# Patient Record
Sex: FEMALE | Race: WHITE | NOT HISPANIC OR LATINO | Employment: PART TIME | URBAN - METROPOLITAN AREA
[De-identification: names, ages, dates, MRNs, and addresses within clinical notes are randomized per-mention and may not be internally consistent; named-entity substitution may affect disease eponyms.]

---

## 2017-02-27 ENCOUNTER — ALLSCRIPTS OFFICE VISIT (OUTPATIENT)
Dept: OTHER | Facility: OTHER | Age: 47
End: 2017-02-27

## 2017-05-03 ENCOUNTER — ALLSCRIPTS OFFICE VISIT (OUTPATIENT)
Dept: OTHER | Facility: OTHER | Age: 47
End: 2017-05-03

## 2017-08-14 ENCOUNTER — ALLSCRIPTS OFFICE VISIT (OUTPATIENT)
Dept: OTHER | Facility: OTHER | Age: 47
End: 2017-08-14

## 2017-08-14 DIAGNOSIS — E78.5 HYPERLIPIDEMIA: ICD-10-CM

## 2017-08-14 DIAGNOSIS — E03.9 HYPOTHYROIDISM: ICD-10-CM

## 2017-08-14 DIAGNOSIS — Z13.1 ENCOUNTER FOR SCREENING FOR DIABETES MELLITUS: ICD-10-CM

## 2017-08-24 ENCOUNTER — ALLSCRIPTS OFFICE VISIT (OUTPATIENT)
Dept: OTHER | Facility: OTHER | Age: 47
End: 2017-08-24

## 2017-11-09 ENCOUNTER — ANESTHESIA EVENT (OUTPATIENT)
Dept: PERIOP | Facility: AMBULARY SURGERY CENTER | Age: 47
End: 2017-11-09
Payer: COMMERCIAL

## 2017-11-26 ENCOUNTER — LAB CONVERSION - ENCOUNTER (OUTPATIENT)
Dept: OTHER | Facility: OTHER | Age: 47
End: 2017-11-26

## 2017-11-26 LAB
A/G RATIO (HISTORICAL): 1.6 (CALC) (ref 1–2.5)
ALBUMIN SERPL BCP-MCNC: 4.5 G/DL (ref 3.6–5.1)
ALP SERPL-CCNC: 82 U/L (ref 33–115)
ALT SERPL W P-5'-P-CCNC: 15 U/L (ref 6–29)
AST SERPL W P-5'-P-CCNC: 19 U/L (ref 10–35)
BACTERIA UR QL AUTO: ABNORMAL /HPF
BASOPHILS # BLD AUTO: 1.5 %
BASOPHILS # BLD AUTO: 60 CELLS/UL (ref 0–200)
BILIRUB SERPL-MCNC: 0.5 MG/DL (ref 0.2–1.2)
BILIRUB UR QL STRIP: NEGATIVE
BUN SERPL-MCNC: 21 MG/DL (ref 7–25)
BUN/CREA RATIO (HISTORICAL): NORMAL (CALC) (ref 6–22)
CALCIUM SERPL-MCNC: 9.3 MG/DL (ref 8.6–10.2)
CHLORIDE SERPL-SCNC: 106 MMOL/L (ref 98–110)
CHOLEST SERPL-MCNC: 252 MG/DL
CHOLEST/HDLC SERPL: 5.5 (CALC)
CO2 SERPL-SCNC: 28 MMOL/L (ref 20–31)
COLOR UR: YELLOW
COMMENT (HISTORICAL): CLEAR
CREAT SERPL-MCNC: 0.83 MG/DL (ref 0.5–1.1)
DEPRECATED RDW RBC AUTO: 12.2 % (ref 11–15)
EGFR AFRICAN AMERICAN (HISTORICAL): 97 ML/MIN/1.73M2
EGFR-AMERICAN CALC (HISTORICAL): 84 ML/MIN/1.73M2
EOSINOPHIL # BLD AUTO: 280 CELLS/UL (ref 15–500)
EOSINOPHIL # BLD AUTO: 7 %
FECAL OCCULT BLOOD DIAGNOSTIC (HISTORICAL): NEGATIVE
GAMMA GLOBULIN (HISTORICAL): 2.8 G/DL (CALC) (ref 1.9–3.7)
GLUCOSE (HISTORICAL): 78 MG/DL (ref 65–99)
GLUCOSE (HISTORICAL): NEGATIVE
HBA1C MFR BLD HPLC: 5.1 % OF TOTAL HGB
HCT VFR BLD AUTO: 37.2 % (ref 35–45)
HDLC SERPL-MCNC: 46 MG/DL
HGB BLD-MCNC: 12.5 G/DL (ref 11.7–15.5)
HYALINE CASTS #/AREA URNS LPF: ABNORMAL /LPF
KETONES UR STRIP-MCNC: NEGATIVE MG/DL
LDL CHOLESTEROL (HISTORICAL): 190 MG/DL (CALC)
LEUKOCYTE ESTERASE UR QL STRIP: ABNORMAL
LYMPHOCYTES # BLD AUTO: 1228 CELLS/UL (ref 850–3900)
LYMPHOCYTES # BLD AUTO: 30.7 %
MCH RBC QN AUTO: 30.8 PG (ref 27–33)
MCHC RBC AUTO-ENTMCNC: 33.6 G/DL (ref 32–36)
MCV RBC AUTO: 91.6 FL (ref 80–100)
MONOCYTES # BLD AUTO: 348 CELLS/UL (ref 200–950)
MONOCYTES (HISTORICAL): 8.7 %
NEUTROPHILS # BLD AUTO: 2084 CELLS/UL (ref 1500–7800)
NEUTROPHILS # BLD AUTO: 52.1 %
NITRITE UR QL STRIP: NEGATIVE
NON-HDL-CHOL (CHOL-HDL) (HISTORICAL): 206 MG/DL (CALC)
PH UR STRIP.AUTO: 6 [PH] (ref 5–8)
PLATELET # BLD AUTO: 204 THOUSAND/UL (ref 140–400)
PMV BLD AUTO: 12 FL (ref 7.5–12.5)
POTASSIUM SERPL-SCNC: 3.8 MMOL/L (ref 3.5–5.3)
PROT UR STRIP-MCNC: NEGATIVE MG/DL
RBC # BLD AUTO: 4.06 MILLION/UL (ref 3.8–5.1)
RBC (HISTORICAL): ABNORMAL /HPF
SODIUM SERPL-SCNC: 140 MMOL/L (ref 135–146)
SP GR UR STRIP.AUTO: 1.02 (ref 1–1.03)
SQUAMOUS EPITHELIAL CELLS (HISTORICAL): ABNORMAL /HPF
TOTAL PROTEIN (HISTORICAL): 7.3 G/DL (ref 6.1–8.1)
TRIGL SERPL-MCNC: 63 MG/DL
WBC # BLD AUTO: 4 THOUSAND/UL (ref 3.8–10.8)
WBC # BLD AUTO: ABNORMAL /HPF

## 2017-11-29 ENCOUNTER — DOCTOR'S OFFICE (OUTPATIENT)
Dept: URBAN - METROPOLITAN AREA CLINIC 137 | Facility: CLINIC | Age: 47
Setting detail: OPHTHALMOLOGY
End: 2017-11-29
Payer: COMMERCIAL

## 2017-11-29 DIAGNOSIS — G43.109: ICD-10-CM

## 2017-11-29 PROCEDURE — 99203 OFFICE O/P NEW LOW 30 MIN: CPT | Performed by: OPHTHALMOLOGY

## 2017-11-29 ASSESSMENT — REFRACTION_MANIFEST
OD_VA3: 20/
OU_VA: 20/
OU_VA: 20/
OD_VA2: 20/
OD_VA3: 20/
OS_VA3: 20/
OD_VA1: 20/
OD_VA3: 20/
OS_VA3: 20/
OU_VA: 20/
OS_VA1: 20/
OS_VA2: 20/
OS_VA1: 20/
OS_VA2: 20/
OS_VA2: 20/
OS_VA1: 20/
OD_VA2: 20/
OD_VA1: 20/
OD_VA1: 20/
OD_VA2: 20/
OS_VA3: 20/

## 2017-11-29 ASSESSMENT — VISUAL ACUITY
OS_BCVA: 20/20
OD_BCVA: 20/20-2

## 2017-11-29 ASSESSMENT — REFRACTION_CURRENTRX
OS_OVR_VA: 20/
OS_OVR_VA: 20/
OD_AXIS: 150
OD_OVR_VA: 20/
OS_OVR_VA: 20/
OS_CYLINDER: +1.75
OD_OVR_VA: 20/
OS_VPRISM_DIRECTION: SV
OS_AXIS: 176
OS_SPHERE: -1.75
OD_SPHERE: -1.75
OD_OVR_VA: 20/
OD_CYLINDER: +1.50
OD_VPRISM_DIRECTION: SV

## 2017-11-29 ASSESSMENT — REFRACTION_AUTOREFRACTION
OD_AXIS: 156
OS_SPHERE: -2.00
OD_SPHERE: -2.00
OD_CYLINDER: +1.25
OS_AXIS: 177
OS_CYLINDER: +2.00

## 2017-11-29 ASSESSMENT — SPHEQUIV_DERIVED
OD_SPHEQUIV: -1.375
OS_SPHEQUIV: -1

## 2017-11-29 ASSESSMENT — CONFRONTATIONAL VISUAL FIELD TEST (CVF)
OD_FINDINGS: FULL
OS_FINDINGS: FULL

## 2017-12-05 ENCOUNTER — ALLSCRIPTS OFFICE VISIT (OUTPATIENT)
Dept: OTHER | Facility: OTHER | Age: 47
End: 2017-12-05

## 2017-12-05 ENCOUNTER — LAB REQUISITION (OUTPATIENT)
Dept: LAB | Facility: HOSPITAL | Age: 47
End: 2017-12-05
Payer: COMMERCIAL

## 2017-12-05 DIAGNOSIS — Z12.31 ENCOUNTER FOR SCREENING MAMMOGRAM FOR MALIGNANT NEOPLASM OF BREAST: ICD-10-CM

## 2017-12-05 DIAGNOSIS — K21.9 GASTRO-ESOPHAGEAL REFLUX DISEASE WITHOUT ESOPHAGITIS: ICD-10-CM

## 2017-12-05 DIAGNOSIS — Z01.419 ENCOUNTER FOR GYNECOLOGICAL EXAMINATION WITHOUT ABNORMAL FINDING: ICD-10-CM

## 2017-12-05 PROCEDURE — G0145 SCR C/V CYTO,THINLAYER,RESCR: HCPCS | Performed by: OBSTETRICS & GYNECOLOGY

## 2017-12-05 PROCEDURE — 87624 HPV HI-RISK TYP POOLED RSLT: CPT | Performed by: OBSTETRICS & GYNECOLOGY

## 2017-12-07 ENCOUNTER — DOCTOR'S OFFICE (OUTPATIENT)
Dept: URBAN - METROPOLITAN AREA CLINIC 137 | Facility: CLINIC | Age: 47
Setting detail: OPHTHALMOLOGY
End: 2017-12-07
Payer: COMMERCIAL

## 2017-12-07 DIAGNOSIS — G43.109: ICD-10-CM

## 2017-12-07 PROCEDURE — 92083 EXTENDED VISUAL FIELD XM: CPT | Performed by: OPHTHALMOLOGY

## 2017-12-13 LAB
HPV RRNA GENITAL QL NAA+PROBE: NORMAL
LAB AP GYN PRIMARY INTERPRETATION: NORMAL
Lab: NORMAL

## 2017-12-14 ENCOUNTER — GENERIC CONVERSION - ENCOUNTER (OUTPATIENT)
Dept: OTHER | Facility: OTHER | Age: 47
End: 2017-12-14

## 2017-12-18 ENCOUNTER — ALLSCRIPTS OFFICE VISIT (OUTPATIENT)
Dept: OTHER | Facility: OTHER | Age: 47
End: 2017-12-18

## 2017-12-19 NOTE — PROGRESS NOTES
Assessment  1  Allergic rhinitis (477 9) (J30 9)   2  Allergic conjunctivitis (372 14) (H10 10)   3  Chronic otitis externa (380 23) (H60 60)    Plan  Allergic conjunctivitis    · Olopatadine HCl - 0 1 % Ophthalmic Solution (Patanol); INSTILL 1 DROP INTOAFFECTED EYE(S) TWICE DAILY AS Needed  Chronic otitis externa    · Ciprodex 0 3-0 1 % Otic Suspension; INSTILL 4 DROPS IN THE AFFECTEDEAR(S) TWICE DAILY X 7-10 DAYS   · 1 Jenna Bolton MD, Michelle Marie  (Otolaryngology) Co-Management  *  Status: Hold For - Scheduling Requested for: [id number]  Care Summary provided  : Yes    Discussion/Summary    rhinoconjunctivitis: Continued OTC saline spray + Flonase  Add Claritin/Zyrtec/Allegra, -D version if needed for congestion  Rx eye drops  Call for no improvement/worsening over the next week  Chronic right OE: Previous topical steroid use didn't seem to help  Rx ear drops  ENT if no better/recurrent  Referral information given  The treatment plan was reviewed with the patient/guardian  The patient/guardian understands and agrees with the treatment plan      Chief Complaint  Pt here c/o sinus infection      History of Present Illness  HPI: with complaints of nasal congestion, clear rhinorrhea, sneezing, itchy eyes x 5 days  No cough, sore throat, fever  Mild headaches  No sinus tenderness  Normal appetite  No N/V/D/abdominal pain  Had flu shot  Chronic right ear itching ( x 2 years)  Scaly, red skin  Gets occasional clear drainage  No pain  No relief from OTC swimmer's ear drops, hydrocortisone, triamcinolone  No swimming  Left ear fine  Review of Systems   Constitutional: as noted in HPI   ENT: as noted in HPI  Cardiovascular: as noted in HPI  Respiratory: as noted in HPI  Gastrointestinal: as noted in HPI  Musculoskeletal: no myalgias  Neurological: as noted in HPI  Active Problems  1  Allergic rhinitis (477 9) (J30 9)   2  Anxiety disorder (300 00) (F41 9)   3  Early menopause (256 31) (E28 319)   4   Encounter for screening mammogram for malignant neoplasm of breast (V76 12) (Z12 31)   5  Esophageal reflux (530 81) (K21 9)   6  Hyperlipidemia (272 4) (E78 5)   7  Hypothyroidism (244 9) (E03 9)   8  Lactose intolerance (271 3) (E73 9)   9  Migraine (346 90) (G43 909)   10  Migraines (346 90) (G43 909)   11  Well female exam with routine gynecological exam (V72 31) (Z01 419)    Past Medical History  1  History of Acute frontal sinusitis (461 1) (J01 10)   2  History of Acute maxillary sinusitis (461 0) (J01 00)   3  History of Acute UTI (599 0) (N39 0)   4  Allergic conjunctivitis (372 14) (H10 10)   5  History of Cellulitis of leg (682 6) (L03 119)   6  Chronic otitis externa (380 23) (H60 60)   7  Contact dermatitis (692 9) (L25 9)   8  History of Contact dermatitis due to poison ivy (692 6) (L23 7)   9  History of Contusion of lower back, initial encounter (922 31) (S30 0XXA)   10  History of acute pharyngitis (V12 69) (Z87 09)   11  History of acute sinusitis (V12 69) (Z87 09)   12  History of allergic rhinitis (V12 69) (Z87 09)   13  History of chest pain (V13 89) (Z87 898)   14  History of spontaneous  (V13 29) (Z87 59)   15  Hyperlipidemia (272 4) (E78 5)   16  History of Otitis externa, unspecified laterality   17  History of  (spontaneous vaginal delivery) (650) (O80)   18  History of Varicose Veins Of Lower Extremities (454 9)    Family History  Mother    1  Denied: Family history of Colon cancer   2  Denied: Family history of colonic polyps   3  Denied: Family history of liver disease  Father    4  Denied: Family history of Colon cancer   5  Denied: Family history of colonic polyps   6  Family history of hyperlipidemia (V18 19) (Z83 49)   7   Denied: Family history of liver disease    Social History   · Does not exercise (V69 0) (Z72 3)   · Denied: History of Drug use   · High school graduate   ·    · 3 children   · Never A Smoker   · No alcohol use   · 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 school teacher    Surgical History  1  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   2  History of Tubal Ligation   3  History of Varicose Vein Ligation    Current Meds   1  Maxalt 10 MG Oral Tablet; Therapy: (Recorded:11Nnb5665) to Recorded   2  Sertraline HCl - 100 MG Oral Tablet; Take 1 tablet daily; Therapy: 24Aug2012 to (Last Rx:14Aug2017)  Requested for: 14Aug2017 Ordered   3  Synthroid 75 MCG Oral Tablet; Therapy: (Recorded:24Qow2832) to Recorded   4  Zantac 150 MG CAPS; Therapy: (Recorded:41Nsy7815) to Recorded    Allergies  1  Cefzil TABS   2  Flagyl CAPS   3  Percocet TABS   4  Biaxin TABS  5  Latex   6  FRUIT    Vitals   Recorded: 45HPH8097 01:35PM   Temperature 98 5 F, Tympanic   Heart Rate 95   Systolic 087   Diastolic 78   Weight 170 lb 4 oz   BMI Calculated 21 72   BSA Calculated 1 83   O2 Saturation 98     Physical Exam   Constitutional  General appearance: No acute distress, well appearing and well nourished  Head and Face  Head and face: Normal    Palpation of the face and sinuses: No sinus tenderness  Eyes  Conjunctiva and lids: Abnormal  -- Minimal bilateral injection of palpebral conjunctiva  Lower lids with mild swelling, erythema, nontender  Pupils and irises: Equal, round, reactive to light  -- No scleral injection  Ears, Nose, Mouth, and Throat  External inspection of ears and nose: Abnormal  -- Right ear with mildly erythematous scaly skin at opening to canal and immediately surrounding  Otoscopic examination: Abnormal  -- Proximal canal with mildly erythematous, scaly skin without swelling or discharge  Remainder of canal + TM wnl  Nasal mucosa, septum, and turbinates: Abnormal  -- Turbinates pink, swollen  Oropharynx: Normal with no erythema, edema, exudate or lesions  Neck  Neck: Supple, symmetric, trachea midline, no masses  Pulmonary  Respiratory effort: No increased work of breathing or signs of respiratory distress     Auscultation of lungs: Clear to auscultation  Cardiovascular  Auscultation of heart: Normal rate and rhythm, normal S1 and S2, no murmurs  Lymphatic  Palpation of lymph nodes in neck: No lymphadenopathy  Musculoskeletal  Gait and station: Normal    Psychiatric  Orientation to person, place, and time: Normal        Future Appointments    Date/Time Provider Specialty Site   12/27/2017 08:00 AM QUE Machado   [facility] [address] OUTPATIENT     Signatures   Electronically signed by : RICARDO Schmitt; Dec 18 2017  2:07PM EST                       (Author)    Electronically signed by : Giovanni Sanchez DO; Dec 18 2017  2:23PM EST                       (Author)

## 2017-12-22 ENCOUNTER — DOCTOR'S OFFICE (OUTPATIENT)
Dept: URBAN - METROPOLITAN AREA CLINIC 137 | Facility: CLINIC | Age: 47
Setting detail: OPHTHALMOLOGY
End: 2017-12-22
Payer: COMMERCIAL

## 2017-12-22 ENCOUNTER — RX ONLY (RX ONLY)
Age: 47
End: 2017-12-22

## 2017-12-22 DIAGNOSIS — G43.109: ICD-10-CM

## 2017-12-22 DIAGNOSIS — H25.13: ICD-10-CM

## 2017-12-22 PROCEDURE — 92014 COMPRE OPH EXAM EST PT 1/>: CPT | Performed by: OPHTHALMOLOGY

## 2017-12-22 RX ORDER — LEVOTHYROXINE SODIUM 0.07 MG/1
75 TABLET ORAL DAILY
COMMUNITY
End: 2018-03-02

## 2017-12-22 RX ORDER — SERTRALINE HYDROCHLORIDE 100 MG/1
100 TABLET, FILM COATED ORAL
COMMUNITY
End: 2018-10-23 | Stop reason: SDUPTHER

## 2017-12-22 RX ORDER — RANITIDINE 150 MG/1
150 TABLET ORAL
COMMUNITY
End: 2019-02-25 | Stop reason: SDUPTHER

## 2017-12-22 ASSESSMENT — CONFRONTATIONAL VISUAL FIELD TEST (CVF)
OS_FINDINGS: FULL
OD_FINDINGS: FULL

## 2017-12-22 NOTE — PRE-PROCEDURE INSTRUCTIONS
Pre-Surgery Instructions:   Medication Instructions    levothyroxine 75 mcg tablet Instructed patient per Anesthesia Guidelines   ranitidine (ZANTAC) 150 MG/10ML syrup Instructed patient per Anesthesia Guidelines   sertraline (ZOLOFT) 100 mg tablet Instructed patient per Anesthesia Guidelines      Pre procedure instructions reviewed

## 2017-12-25 ASSESSMENT — REFRACTION_MANIFEST
OS_VA2: 20/
OD_VA3: 20/
OS_VA3: 20/
OU_VA: 20/
OU_VA: 20/
OD_VA3: 20/
OD_VA2: 20/
OS_VA1: 20/
OD_VA2: 20/
OD_VA2: 20/
OS_VA2: 20/
OS_VA1: 20/
OU_VA: 20/
OS_VA3: 20/
OS_VA1: 20/
OD_VA1: 20/
OS_VA2: 20/
OD_VA3: 20/
OD_VA1: 20/
OD_VA1: 20/
OS_VA3: 20/

## 2017-12-25 ASSESSMENT — SPHEQUIV_DERIVED
OS_SPHEQUIV: -1
OD_SPHEQUIV: -1.375

## 2017-12-25 ASSESSMENT — REFRACTION_AUTOREFRACTION
OD_AXIS: 156
OD_CYLINDER: +1.25
OS_AXIS: 177
OS_CYLINDER: +2.00
OD_SPHERE: -2.00
OS_SPHERE: -2.00

## 2017-12-25 ASSESSMENT — REFRACTION_CURRENTRX
OD_VPRISM_DIRECTION: SV
OS_OVR_VA: 20/
OS_SPHERE: -1.75
OS_CYLINDER: +1.75
OD_SPHERE: -1.75
OD_CYLINDER: +1.50
OS_OVR_VA: 20/
OS_AXIS: 176
OS_OVR_VA: 20/
OD_AXIS: 150
OD_OVR_VA: 20/
OS_VPRISM_DIRECTION: SV

## 2017-12-25 ASSESSMENT — VISUAL ACUITY
OS_BCVA: 20/20
OD_BCVA: 20/20

## 2017-12-26 ENCOUNTER — ALLSCRIPTS OFFICE VISIT (OUTPATIENT)
Dept: OTHER | Facility: OTHER | Age: 47
End: 2017-12-26

## 2017-12-27 ENCOUNTER — ANESTHESIA (OUTPATIENT)
Dept: PERIOP | Facility: AMBULARY SURGERY CENTER | Age: 47
End: 2017-12-27
Payer: COMMERCIAL

## 2017-12-27 ENCOUNTER — GENERIC CONVERSION - ENCOUNTER (OUTPATIENT)
Dept: OTHER | Facility: OTHER | Age: 47
End: 2017-12-27

## 2017-12-27 ENCOUNTER — HOSPITAL ENCOUNTER (OUTPATIENT)
Facility: AMBULARY SURGERY CENTER | Age: 47
Setting detail: OUTPATIENT SURGERY
Discharge: HOME | End: 2017-12-27
Attending: INTERNAL MEDICINE | Admitting: INTERNAL MEDICINE
Payer: COMMERCIAL

## 2017-12-27 VITALS
WEIGHT: 150 LBS | BODY MASS INDEX: 21.47 KG/M2 | HEART RATE: 70 BPM | SYSTOLIC BLOOD PRESSURE: 124 MMHG | DIASTOLIC BLOOD PRESSURE: 80 MMHG | TEMPERATURE: 98.1 F | RESPIRATION RATE: 18 BRPM | HEIGHT: 70 IN | OXYGEN SATURATION: 97 %

## 2017-12-27 DIAGNOSIS — K21.9 ESOPHAGEAL REFLUX: ICD-10-CM

## 2017-12-27 PROCEDURE — 88342 IMHCHEM/IMCYTCHM 1ST ANTB: CPT | Performed by: INTERNAL MEDICINE

## 2017-12-27 PROCEDURE — 88305 TISSUE EXAM BY PATHOLOGIST: CPT | Performed by: INTERNAL MEDICINE

## 2017-12-27 RX ORDER — SODIUM CHLORIDE 9 MG/ML
100 INJECTION, SOLUTION INTRAVENOUS CONTINUOUS
Status: DISCONTINUED | OUTPATIENT
Start: 2017-12-27 | End: 2017-12-27 | Stop reason: HOSPADM

## 2017-12-27 RX ORDER — LIDOCAINE HYDROCHLORIDE 10 MG/ML
INJECTION, SOLUTION EPIDURAL; INFILTRATION; INTRACAUDAL; PERINEURAL AS NEEDED
Status: DISCONTINUED | OUTPATIENT
Start: 2017-12-27 | End: 2017-12-27 | Stop reason: SURG

## 2017-12-27 RX ORDER — FENTANYL CITRATE 50 UG/ML
INJECTION, SOLUTION INTRAMUSCULAR; INTRAVENOUS AS NEEDED
Status: DISCONTINUED | OUTPATIENT
Start: 2017-12-27 | End: 2017-12-27

## 2017-12-27 RX ORDER — PROPOFOL 10 MG/ML
INJECTION, EMULSION INTRAVENOUS AS NEEDED
Status: DISCONTINUED | OUTPATIENT
Start: 2017-12-27 | End: 2017-12-27 | Stop reason: SURG

## 2017-12-27 RX ORDER — AMOXICILLIN 875 MG/1
875 TABLET, COATED ORAL 2 TIMES DAILY
COMMUNITY
End: 2018-03-02

## 2017-12-27 RX ADMIN — PROPOFOL 100 MG: 10 INJECTION, EMULSION INTRAVENOUS at 08:41

## 2017-12-27 RX ADMIN — SODIUM CHLORIDE: 0.9 INJECTION, SOLUTION INTRAVENOUS at 08:38

## 2017-12-27 RX ADMIN — LIDOCAINE HYDROCHLORIDE 5 ML: 10 INJECTION, SOLUTION EPIDURAL; INFILTRATION; INTRACAUDAL; PERINEURAL at 08:46

## 2017-12-27 RX ADMIN — PROPOFOL 100 MG: 10 INJECTION, EMULSION INTRAVENOUS at 08:48

## 2017-12-27 RX ADMIN — SODIUM CHLORIDE 100 ML/HR: 0.9 INJECTION, SOLUTION INTRAVENOUS at 08:29

## 2017-12-27 NOTE — OP NOTE
ESOPHAGOGASTRODUODENOSCOPY    PROCEDURE: EGD/ Biopsy    INDICATIONS: Abdominal pain, Epigastric and Dyspepsia    POST-OP DIAGNOSIS: See the impression below    SEDATION: Monitored anesthesia care, check anesthesia records    PHYSICAL EXAM:    Vitals:    12/27/17 0741   BP: 123/72   Pulse: 81   Resp: 18   Temp: 97 7 °F (36 5 °C)   SpO2: 96%    Body mass index is 21 52 kg/m²  General: NAD  Heart: S1 & S2 normal, RRR  Lungs: CTA, No rales or rhonchi  Abdomen: Soft, nontender, nondistended, good bowel sounds    CONSENT:  Informed consent was obtained for the procedure, including sedation after explaining the risks and benefits of the procedure  Risks including but not limited to bleeding, perforation, infection, aspiration were discussed in detail  Also explained about less than 100% sensitivity with the exam and other alternatives  PREPARATION:   EKG tracing, pulse oximetry, blood pressure were monitored throughout the procedure  Patient was identified by myself both verbally and by visual inspection of ID band  DESCRIPTION:   Patient was placed in the left lateral decubitus position and was sedated with the above medication  The gastroscope was introduced in to the oropharynx and the esophagus was intubated under direct visualization  Scope was passed down the esophagus up to 2nd part of the duodenum  A careful inspection was made as the gastroscope was withdrawn, including a retroflexed view of the stomach; findings and interventions are described below  FINDINGS:    #1  Esophagus and GEJ-normal esophagus and GE junction    #2  Stomach-absence of gastric folds, up atrophic appearing mucosa biopsies taken, appeared to be some general deformity significant J-shaped stomach    #3   Duodenum-normal appearing duodenum, biopsies taken again some anatomic deformity existed but this may have been due to the severity of her J-shaped stomach         IMPRESSIONS:      Normal duodenum, biopsies taken  Atrophic appearing stomach, biopsies taken  Normal retroflexion  Severely J-shaped stomach  Normal esophagus    RECOMMENDATIONS:     Discharge home  Resume regular diet  Resume home medications  Follow up biopsy results, call the office in 3 weeks for results  Recommend upper GI series  Call with any abdominal pain, bleeding, fevers    COMPLICATIONS:  None; patient tolerated the procedure well            DISPOSITION: PACU           CONDITION: Stable

## 2017-12-27 NOTE — H&P
History and Physical -  [facility]  Nilton Shine 52 y o  female MRN: [id number]    HPI: Nilton Shine is a 52y o  year old female who presents with bloating, abdominal pain  Review of Systems    Historical Information   Past Medical History:   Diagnosis Date    Anxiety     Disease of thyroid gland     GERD (gastroesophageal reflux disease)     Headache     Hyperlipidemia      Past Surgical History:   Procedure Laterality Date    DILATION AND CURETTAGE OF UTERUS      EAR SURGERY      age 11    VEIN LIGATION AND STRIPPING Bilateral     WISDOM TOOTH EXTRACTION       Social History   History   Alcohol Use No     History   Drug Use No     History   Smoking Status    Never Smoker   Smokeless Tobacco    Never Used     History reviewed  No pertinent family history  Meds/Allergies     Prescriptions Prior to Admission   Medication    amoxicillin (AMOXIL) 875 mg tablet    levothyroxine 75 mcg tablet    ranitidine (ZANTAC) 150 MG/10ML syrup    sertraline (ZOLOFT) 100 mg tablet       Allergies   Allergen Reactions    Biaxin [Clarithromycin] Diarrhea    Cefzil [Cefprozil] Dizziness    Flagyl [Metronidazole] Nausea Only    Percocet [Oxycodone-Acetaminophen] Nausea Only    Latex Rash       Objective     Blood pressure 123/72, pulse 81, temperature 97 7 °F (36 5 °C), temperature source Temporal, resp  rate 18, height 5' 10" (1 778 m), weight 68 kg (150 lb), SpO2 96 %  PHYSICAL EXAM    Gen: NAD  CV: RRR  CHEST: Clear  ABD: soft, NT/ND  EXT: no edema  Neuro: AAO      ASSESSMENT/PLAN:  This is a 52y o  year old female here for bloating, abdominal pain         PLAN:   Procedure: Collin Lilly

## 2017-12-27 NOTE — ANESTHESIA POSTPROCEDURE EVALUATION
Post-Op Assessment Note      CV Status:  Stable    Mental Status:  Alert and awake    Hydration Status:  Euvolemic    PONV Controlled:  Controlled    Airway Patency:  Patent    Post Op Vitals Reviewed: Yes          Staff: Anesthesiologist           /73 (12/27/17 0852)    Temp 98 1 °F (36 7 °C) (12/27/17 0852)    Pulse 86 (12/27/17 0852)   Resp 16 (12/27/17 0852)    SpO2 96 % (12/27/17 0852)

## 2017-12-27 NOTE — ANESTHESIA PREPROCEDURE EVALUATION
Review of Systems/Medical History  Patient summary reviewed        Cardiovascular  Hyperlipidemia,    Pulmonary  Negative pulmonary ROS ,        GI/Hepatic    GERD ,        Negative  ROS        Endo/Other  History of thyroid disease , hypothyroidism,      GYN  Negative gynecology ROS          Hematology  Negative hematology ROS      Musculoskeletal  Negative musculoskeletal ROS        Neurology  Negative neurology ROS      Psychology   Anxiety,            Physical Exam    Airway    Mallampati score: II  TM Distance: >3 FB  Neck ROM: full     Dental       Cardiovascular  Cardiovascular exam normal    Pulmonary  Pulmonary exam normal     Other Findings        Anesthesia Plan  ASA Score- 2     Anesthesia Type- IV sedation with anesthesia with ASA Monitors  Additional Monitors:   Airway Plan:         Plan Factors-    Induction- intravenous  Postoperative Plan-     Informed Consent- Anesthetic plan and risks discussed with patient  I personally reviewed this patient with the CRNA  Discussed and agreed on the Anesthesia Plan with the CRNA  Jimmy Abbott

## 2017-12-27 NOTE — DISCHARGE INSTRUCTIONS
Discharge home  Resume regular diet  Resume home medications  Follow up biopsy results, call the office in 3 weeks for results  Recommend upper GI series  Call with any abdominal pain, bleeding, fevers

## 2017-12-27 NOTE — PROGRESS NOTES
Assessment   1  Acute non-recurrent pansinusitis (461 8) (J01 40)    Plan   Acute non-recurrent pansinusitis    · Amoxicillin-Pot Clavulanate 875-125 MG Oral Tablet; TAKE 1 TABLET EVERY 12    HOURS WITH MEALS UNTIL GONE  PMH: Otitis externa, unspecified laterality    · Neomycin-Polymyxin-HC 3 [id number] Otic Suspension; INSTILL 3 DROPS IN    AFFECTED EAR(S) 3-4 TIMES DAILY    Discussion/Summary      Symptoms appear to be most consistent with sinusitis  Will empirically treat with 10 day course of oral Augmentin  Recommend supportive care with saltwater gargles and saline nasal spray  Patient to call if symptoms not improving in 2-3 days--would consider changing antibiotic at that time  The patient was counseled regarding diagnostic results,-- instructions for management,-- risk factor reductions,-- prognosis,-- patient and family education,-- impressions,-- risks and benefits of treatment options,-- importance of compliance with treatment  Possible side effects of new medications were reviewed with the patient/guardian today  The treatment plan was reviewed with the patient/guardian  The patient/guardian understands and agrees with the treatment plan      Chief Complaint   PT c/o sinus congestion x 1 week w productive cough  Some difficulty at night due to congestion  History of Present Illness   HPI: Patient presents complaining of sinus infection began greater than 1 week ago, worsening nasal congestion, sinus pressure, frontal headaches, productive cough, fatigue/malaise fevers, chills, nausea, vomiting, diarrhea, chest pain, shortness of breath/wheezing relief with over-the-counter allergy or cough and cold remedies has been on multiple sick contacts as she does work in a  setting      Review of Systems        Constitutional: as noted in HPI       ENT: as noted in HPI        Cardiovascular: no complaints of slow or fast heart rate, no chest pain, no palpitations, no leg claudication or lower extremity edema  Respiratory: as noted in HPI  Gastrointestinal: no complaints of abdominal pain, no constipation, no nausea or diarrhea, no vomiting, no bloody stools  Genitourinary: no complaints of dysuria, no incontinence, no pelvic pain, no dysmenorrhea, no vaginal discharge or abnormal vaginal bleeding  Musculoskeletal: no complaints of arthralgia, no myalgia, no joint swelling or stiffness, no limb pain or swelling  Integumentary: no complaints of skin rash or lesion, no itching or dry skin, no skin wounds  Neurological: no complaints of headache, no confusion, no numbness or tingling, no dizziness or fainting  Active Problems   1  Acute upper respiratory infection (465 9) (J06 9)   2  Allergic conjunctivitis (372 14) (H10 10)   3  Allergic rhinitis (477 9) (J30 9)   4  Anxiety disorder (300 00) (F41 9)   5  Chronic otitis externa (380 23) (H60 60)   6  Early menopause (256 31) (E28 319)   7  Encounter for screening mammogram for malignant neoplasm of breast (V76 12)     (Z12 31)   8  Esophageal reflux (530 81) (K21 9)   9  Hyperlipidemia (272 4) (E78 5)   10  Hypothyroidism (244 9) (E03 9)   11  Lactose intolerance (271 3) (E73 9)   12  Migraine (346 90) (G43 909)   13  Migraines (346 90) (G43 909)   14  Well female exam with routine gynecological exam (V72 31) (Z01 419)    Past Medical History   1  History of Acute frontal sinusitis (461 1) (J01 10)   2  History of Acute maxillary sinusitis (461 0) (J01 00)   3  History of Acute UTI (599 0) (N39 0)   4  Allergic conjunctivitis (372 14) (H10 10)   5  History of Cellulitis of leg (682 6) (L03 119)   6  Chronic otitis externa (380 23) (H60 60)   7  Contact dermatitis (692 9) (L25 9)   8  History of Contact dermatitis due to poison ivy (692 6) (L23 7)   9  History of Contusion of lower back, initial encounter (922 31) (S30 0XXA)   10  History of acute pharyngitis (V12 69) (Z87 09)   11   History of acute sinusitis (V12 69) (Z87 09)   12  History of allergic rhinitis (V12 69) (Z87 09)   13  History of chest pain (V13 89) (Z87 898)   14  History of spontaneous  (V13 29) (Z87 59)   15  Hyperlipidemia (272 4) (E78 5)   16  History of Otitis externa, unspecified laterality   17  History of  (spontaneous vaginal delivery) (650) (O80)   18  History of Varicose Veins Of Lower Extremities (454 9)  Active Problems And Past Medical History Reviewed: The active problems and past medical history were reviewed and updated today  Family History   Mother    1  Denied: Family history of Colon cancer   2  Denied: Family history of colonic polyps   3  Denied: Family history of liver disease  Father    4  Denied: Family history of Colon cancer   5  Denied: Family history of colonic polyps   6  Family history of hyperlipidemia (V18 19) (Z83 49)   7  Denied: Family history of liver disease  Family History Reviewed: The family history was reviewed and updated today  Social History    · Does not exercise (V69 0) (Z72 3)   · Denied: History of Drug use   · High school graduate   ·    · 3 children   · Never A Smoker   · No alcohol use   · 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   The social history was reviewed and updated today  The social history was reviewed and is unchanged  Surgical History   1  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   2  History of Tubal Ligation   3  History of Varicose Vein Ligation  Surgical History Reviewed: The surgical history was reviewed and updated today  Current Meds    1  Maxalt 10 MG Oral Tablet; Take one tablet daily as needed; Therapy: (Recorded:06Mgo3651) to Recorded   2  Promethazine-Codeine 6 25-10 MG/5ML Oral Syrup; take 1 teaspoonful every 4 to 6     hours as needed for cough; Therapy: 16Ytg4933 to (Evaluate:47Vii5185); Last Rx:06Kax6551 Ordered   3  Sertraline HCl - 100 MG Oral Tablet; Take 1 tablet daily;      Therapy: 81Crv8584 to (Last Rx: 10UUL6167)  Requested for: 22TXM0222 Ordered   4  Synthroid 75 MCG Oral Tablet; TAKE 1 TABLET DAILY; Therapy: (Recorded:70Jde9910) to Recorded   5  Zantac 150 MG CAPS; Therapy: (Recorded:53Acf2026) to Recorded     The medication list was reviewed and updated today  Allergies   1  Cefzil TABS   2  Flagyl CAPS   3  Percocet TABS   4  Biaxin TABS  5  Latex   6  FRUIT    Vitals    Recorded: 13UOZ6211 04:17PM   Temperature 98 6 F, Tympanic   Heart Rate 95   Systolic 062, LUE, Sitting   Diastolic 80, LUE, Sitting   Height 5 ft 8 in   Weight 152 lb    BMI Calculated 23 11   BSA Calculated 1 82   O2 Saturation 98     Physical Exam        Constitutional      General appearance: No acute distress, well appearing and well nourished  Eyes      Conjunctiva and lids: No swelling, erythema or discharge  Ears, Nose, Mouth, and Throat      External inspection of ears and nose: Normal        Otoscopic examination: Abnormal  -- TMs retracted R>L  Nasal mucosa, septum, and turbinates: Abnormal  -- Bilateral sinus TTP  Oropharynx: Abnormal  -- Oropharynx mildly erythematous with postnasal drip/cobblestoning  Pulmonary      Respiratory effort: No increased work of breathing or signs of respiratory distress  Auscultation of lungs: Clear to auscultation  Cardiovascular      Auscultation of heart: Normal rate and rhythm, normal S1 and S2, without murmurs  Examination of extremities for edema and/or varicosities: Normal        Abdomen      Abdomen: Non-tender, no masses  Lymphatic      Palpation of lymph nodes in neck: No lymphadenopathy  Musculoskeletal      Gait and station: Normal        Skin      Skin and subcutaneous tissue: Normal without rashes or lesions  Neurologic      Cranial nerves: Cranial nerves 2-12 intact         Psychiatric      Orientation to person, place, and time: Normal        Mood and affect: Normal           Future Appointments Date/Time Provider Specialty Site   12/27/2017 08:00 AM QUE Skinner   [facility] [address] [place] [address]     Signatures    Electronically signed by : Christiane Lamar DO; Dec 26 2017  4:49PM EST                       (Author)

## 2018-01-01 ENCOUNTER — GENERIC CONVERSION - ENCOUNTER (OUTPATIENT)
Dept: OTHER | Facility: OTHER | Age: 48
End: 2018-01-01

## 2018-01-11 NOTE — RESULT NOTES
Verified Results  (Q) LIPID PANEL WITH REFLEX TO DIRECT LDL [id number] 09:00AM [facility]     Test Name Result Flag Reference   CHOLESTEROL, TOTAL 227 mg/dL H 125-200   HDL CHOLESTEROL 34 mg/dL L > OR = 46   TRIGLICERIDES 950 mg/dL  <150   LDL-CHOLESTEROL 168 mg/dL (calc) H <130   Desirable range <100 mg/dL for patients with CHD or  diabetes and <70 mg/dL for diabetic patients with  known heart disease  CHOL/HDLC RATIO 6 7 (calc) H < OR = 5 0   NON HDL CHOLESTEROL 193 mg/dL (calc) H    Target for non-HDL cholesterol is 30 mg/dL higher than   LDL cholesterol target  (1) COMPREHENSIVE METABOLIC PANEL [id number] 64:04LE [facility]   REPORT COMMENT:  FASTING:YES     Test Name Result Flag Reference   GLUCOSE 83 mg/dL  65-99   Fasting reference interval   UREA NITROGEN (BUN) 19 mg/dL  7-25   CREATININE 0 81 mg/dL  0 50-1 10   eGFR NON-AFR  AMERICAN 88 mL/min/1 73m2  > OR = 60   eGFR AFRICAN AMERICAN 102 mL/min/1 73m2  > OR = 60   BUN/CREATININE RATIO   0-32   NOT APPLICABLE (calc)   SODIUM 140 mmol/L  135-146   POTASSIUM 4 1 mmol/L  3 5-5 3   CHLORIDE 104 mmol/L     CARBON DIOXIDE 25 mmol/L  19-30   CALCIUM 9 5 mg/dL  8 6-10 2   PROTEIN, TOTAL 7 1 g/dL  6 1-8 1   ALBUMIN 4 5 g/dL  3 6-5 1   GLOBULIN 2 6 g/dL (calc)  1 9-3 7   ALBUMIN/GLOBULIN RATIO 1 7 (calc)  1 0-2 5   BILIRUBIN, TOTAL 0 6 mg/dL  0 2-1 2   ALKALINE PHOSPHATASE 93 U/L     AST 18 U/L  10-35   ALT 12 U/L  6-29       Discussion/Summary   Normal bloodwork except for continued elevated total and LDL ("bad") cholesterol, about the same as previous  Increased fiber, decreased "bad" fats in diet should help with this  Consider flax seed, red yeast rice  Call if questions   --Danial Hussein

## 2018-01-12 VITALS
BODY MASS INDEX: 21.51 KG/M2 | OXYGEN SATURATION: 98 % | HEART RATE: 87 BPM | RESPIRATION RATE: 18 BRPM | DIASTOLIC BLOOD PRESSURE: 62 MMHG | WEIGHT: 145.25 LBS | TEMPERATURE: 97.8 F | HEIGHT: 69 IN | SYSTOLIC BLOOD PRESSURE: 124 MMHG

## 2018-01-12 VITALS
HEART RATE: 81 BPM | SYSTOLIC BLOOD PRESSURE: 128 MMHG | BODY MASS INDEX: 21.34 KG/M2 | DIASTOLIC BLOOD PRESSURE: 80 MMHG | OXYGEN SATURATION: 98 % | TEMPERATURE: 98.1 F | WEIGHT: 144.5 LBS

## 2018-01-12 NOTE — PROGRESS NOTES
Assessment    1  Well adult exam (V70 0) (Z00 00)   2  Lower back pain (724 2) (M54 5)   3  Allergic rhinitis (477 9) (J30 9)   4  Anxiety disorder (300 00) (F41 9)   5  Esophageal reflux (530 81) (K21 9)   6  Hyperlipidemia (272 4) (E78 5)   7  Hypothyroidism (244 9) (E03 9)   8  Migraine (346 90) (G43 909)   9  Diabetes mellitus screening (V77 1) (Z13 1)    Plan  Anxiety disorder    · Sertraline HCl - 100 MG Oral Tablet; Take 1 tablet daily  Depression screening    · *VB - PHQ-9 Tool; Status:Complete - Retrospective By Protocol Authorization;   Done:  91WDS4786 01:03PM  Diabetes mellitus screening, Hyperlipidemia, Hypothyroidism    · (1) CBC/PLT/DIFF; Status:Active; Requested for:92Ycx4695;    · (1) COMPREHENSIVE METABOLIC PANEL; Status:Active; Requested for:72Hlc2270;    · (1) HEMOGLOBIN A1C; Status:Active; Requested for:09Ntg4870;    · (1) LIPID PANEL FASTING W DIRECT LDL REFLEX; Status:Active; Requested  for:54Vvf6454;    · (1) URINALYSIS w URINE C/S REFLEX (will reflex a microscopy if leukocytes, occult  blood, or nitrites are not within normal limits); Status:Active; Requested for:45Nbt8012; Well adult exam    · Call [phone] if: You find a new or different kind of lump in your breast ;  Status:Complete;   Done: 79HXT8031   · Call [phone] if: You have any warning signs of skin cancer ; Status:Complete;    Done: 83KBG1690   · Begin or continue regular aerobic exercise   Gradually work up to at least 3 sessions of 30  minutes of exercise a week ; Status:Complete;   Done: 05ULA2661   · Brush your teeth 3 times a day and floss at least once a day ; Status:Complete;   Done:  43QZS8151   · Stretch and warm up your muscles during the first 10 minutes , then cool down your  muscles for the last 10 minutes of exercise ; Status:Complete;   Done: 22RDZ0834   · There are many ways to reduce your risk of catching or spreading a sexually transmitted  Infection ; Status:Complete;   Done: 82ISO7386   · Use a sun block product with an SPF of 15 or more ; Status:Complete;   Done:  24SLT1020   · Vitamins can help you get daily requirements that your diet may not be giving you ;  Status:Complete;   Done: 81EUQ4113   · We recommend routine visits to a dentist ; Status:Complete;   Done: 38IHB3451   · We recommend that you bring your body mass index down to 26 ; Status:Complete;    Done: 54JLT8910   · We recommend that you follow these steps to lower your risk of osteoporosis  ;  Status:Complete;   Done: 01QSU7143   · You need to quit smoking ; Status:Complete;   Done: 41NET0130    Discussion/Summary  health maintenance visit Currently, she eats a healthy diet and has an adequate exercise regimen  cervical cancer screening is managed by GYN Breast cancer screening: the risks and benefits of breast cancer screening were discussed, monthly self breast exam was advised and breast cancer screening is managed by GYN  Colorectal cancer screening: colorectal cancer screening is not indicated  Screening lab work includes glucose, lipid profile and urinalysis  The immunizations are up to date  Advice and education were given regarding nutrition, aerobic exercise and vitamin D supplements  Patient discussion: discussed with the patient  Preventative: Work PE form completed-->no restrictions  Upcoming GYN appointment  Wishes to wait for mammogram order until then  UTD with Tdap, eye exam  Flu shot in the fall  Hyperlipidemia: Diet/lifestyle control  Slip given for repeat lipids  Hypothyroidism: Asymptomatic on replacement therapy  Followed by endocrine  GERD: Controlled with Zantac +/- Gaviscon, along with avoidance of dietary triggers  Followed by GI with surveillance EGD to be scheduled  Anxiety: Remains well controlled on Zoloft  Migraines: Infrequent  Takes Maxalt prn  Allergies: Claritin or Alavert, + Flonase  RTO 1 year  The treatment plan was reviewed with the patient/guardian   The patient/guardian understands and agrees with the treatment plan      Chief Complaint  PT PRESENTS FOR A PHYSICAL      History of Present Illness  HM, Adult Female: The patient is being seen for a health maintenance evaluation  The last health maintenance visit was 2 year(s) ago  Social History: Household members include daughter(s) and son(s)  She is   Work status: working part-time  The patient has never smoked cigarettes  She reports rare alcohol use  She has never used illicit drugs  General Health: The patient's health since the last visit is described as good  She has regular dental visits  She denies vision problems  She denies hearing loss  Immunizations status: up to date  Lifestyle:  She consumes a diverse and healthy diet  She does not have any weight concerns  She exercises regularly  She does not use tobacco  She denies alcohol use  Reproductive health: the patient is postmenopausal   she reports normal menses  she is sexually active  Screening: cancer screening reviewed and updated  metabolic screening reviewed and updated  risk screening reviewed and updated  HPI:   Here for routine well exam  Needs work PE form completed  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  No PPD needed this time  No known infectious contacts, recent fevers, night sweats, weight loss  Tolerating meds without AE's  Anxiety remains well controlled with Zoloft  Heartburn managed with daily use of Zantac + occasional Gaviscon  No dysphagia, melena/hematochezia  Followed by GI (Dr Gwyn Sofia)  Had to reschedule EGD  Occasional migraines (once a month)  Maxalt helps  Some lower back pain x couple months  Initial sciatica, now resolved  Seeing chiropractor + doing exercises which has helped considerably  Takes Aleve on occasion  No pain at present  Non-limiting  No other ortho issues  , 3 children ages 25, 13, 6  Oldest will be senior in [employer]  Thinking about cosmetology   They have a beagle and [facility] howell puppy at home  Fairly healthy diet overall  Admits to some indiscretions over the summer (slushies, etc)  On her feet/walks regularly  No smoking, alcohol, drug use  GYN appointment in October  Will get slip for mammogram then  Had recent eye exam  Glasses updated  Tdap 2013  Review of Systems    Constitutional: no fever and not feeling tired  Eyes: no eyesight problems  ENT: as noted in HPI and no sore throat  Cardiovascular: no chest pain and no lower extremity edema  Respiratory: no shortness of breath and no cough  Gastrointestinal: as noted in HPI, no nausea, no vomiting, no constipation, no diarrhea and no blood in stools  Genitourinary: no dysuria and no unexplained vaginal bleeding  Musculoskeletal: as noted in HPI  Integumentary: no rashes  Neurological: as noted in HPI  Psychiatric: as noted in HPI and no depression  Hematologic/Lymphatic: no swollen glands  Over the past 2 weeks, how often have you been bothered by the following problems? 1 ) Little interest or pleasure in doing things? Not at all    2 ) Feeling down, depressed or hopeless? Not at all  Active Problems    1  Allergic rhinitis (477 9) (J30 9)   2  Anxiety disorder (300 00) (F41 9)   3  History of Eczema of external ear, right (380 22) (H60 541)   4  Esophageal reflux (530 81) (K21 9)   5  Hyperlipidemia (272 4) (E78 5)   6  Hypothyroidism (244 9) (E03 9)   7  Migraine (346 90) (G43 909)   8  History of Neck pain (723 1) (M54 2)   9   Need for influenza vaccination (V04 81) (Z23)    Past Medical History    · History of Acute frontal sinusitis (461 1) (J01 10)   · History of Acute maxillary sinusitis (461 0) (J01 00)   · History of Acute UTI (599 0) (N39 0)   · History of Cellulitis of leg (682 6) (L03 119)   · Contact dermatitis (692 9) (L25 9)   · History of Contact dermatitis due to poison ivy (692 6) (L23 7)   · History of Contusion of lower back, initial encounter (922 31) (S30 0XXA)   · Diabetes mellitus screening (V77 1) (Z13 1)   · History of Eczema of external ear, right (380 22) (H60 541)   · History of acute pharyngitis (V12 69) (Z87 09)   · History of acute sinusitis (V12 69) (Z87 09)   · History of allergic rhinitis (V12 69) (Z87 09)   · History of chest pain (V13 89) (J80 597)   · Hyperlipidemia (272 4) (E78 5)   · Lower back pain (724 2) (M54 5)   · History of Neck pain (723 1) (M54 2)   · History of Otitis externa, unspecified laterality   · History of Varicose Veins Of Lower Extremities (454 9)   · Well adult exam (V70 0) (Z00 00)    Surgical History    · 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   · History of Varicose Vein Ligation    Family History  Mother    · Denied: Family history of Colon cancer   · Denied: Family history of colonic polyps   · Denied: Family history of liver disease  Father    · Denied: Family history of Colon cancer   · Denied: Family history of colonic polyps   · Family history of hyperlipidemia (V18 19) (Z83 49)   · Denied: Family history of liver disease    Social History    · High school graduate   ·    · 3 children   · Never A Smoker   · No alcohol use   · 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     Current Meds   1  Alavert 10 MG Oral Tablet; TAKE 1 TABLET AT BEDTIME AS NEEDED FOR ALLERGIES; Therapy: 18BGD6225 to (Evaluate:07Jun2014) Recorded   2  Fluticasone Propionate 50 MCG/ACT Nasal Suspension; USE 1 TO 2 SPRAYS IN EACH   NOSTRIL ONCE DAILY; Therapy: 03IOS6991 to (Last Rx:19Spd9710)  Requested for: 65Gse9210 Ordered   3  RaNITidine HCl - 150 MG Oral Capsule; take 1 capsule daily; Therapy: 13Wvh6545 to (Last Rx:72Eyn9233)  Requested for: 40Ojr7262 Ordered   4  Sertraline HCl - 100 MG Oral Tablet; Take 1 tablet daily; Therapy: 17Vlm7041 to (Last Rx:03Zoa5950)  Requested for: 52Gbr4664 Ordered   5  Synthroid 75 MCG Oral Tablet; Therapy: (Recorded:52Kgp0405) to Recorded    Allergies    1   Cefzil TABS 2  Flagyl CAPS   3  Percocet TABS   4  Biaxin TABS    5  Latex    Vitals   Recorded: 95Ntg7039 12:53PM   Temperature 97 8 F, Tympanic   Heart Rate 87   Respiration 18   Systolic 498   Diastolic 62   Height 5 ft 9 in   Weight 145 lb 4 oz   BMI Calculated 21 45   BSA Calculated 1 8   O2 Saturation 98     Physical Exam    Constitutional   General appearance: No acute distress, well appearing and well nourished  Head and Face   Head and face: Normal     Eyes   Conjunctiva and lids: No swelling, erythema or discharge  Pupils and irises: Equal, round, reactive to light  Ophthalmoscopic examination: Normal fundi and optic discs  Ears, Nose, Mouth, and Throat   External inspection of ears and nose: Normal     Otoscopic examination: Tympanic membranes translucent with normal light reflex  Canals patent without erythema  Nasal mucosa, septum, and turbinates: Normal without edema or erythema  Lips, teeth, and gums: Normal, good dentition  Oropharynx: Normal with no erythema, edema, exudate or lesions  Neck   Neck: Supple, symmetric, trachea midline, no masses  Thyroid: Normal, no thyromegaly  Pulmonary   Respiratory effort: No increased work of breathing or signs of respiratory distress  Auscultation of lungs: Clear to auscultation  Cardiovascular   Auscultation of heart: Normal rate and rhythm, normal S1 and S2, no murmurs  Carotid pulses: 2+ bilaterally  Examination of extremities for edema and/or varicosities: Normal     Abdomen   Abdomen: Non-tender, no masses  Liver and spleen: No hepatomegaly or splenomegaly  Lymphatic   Palpation of lymph nodes in neck: No lymphadenopathy  Musculoskeletal   Gait and station: Normal     Joints, bones, and muscles: Normal     Range of motion: Normal   Full spine ROM without pain, at this time  Muscle strength/tone: Normal     Skin   Skin and subcutaneous tissue: Normal without rashes or lesions      Neurologic   Reflexes: 2+ and symmetric  Psychiatric   Orientation to person, place, and time: Normal     Mood and affect: Normal        Results/Data  *VB - PHQ-9 Tool 21BBB9912 01:03PM Dennis Brochure     Test Name Result Flag Reference   PHQ-9 Adult Depression Score -     PHQ-9 Adult Depression Screening Negative         Signatures   Electronically signed by : RICARDO Hayden;  Aug 14 2017  1:51PM EST                       (Author)    Electronically signed by : Joleen Valenzuela DO; Aug 14 2017  1:51PM EST                       (Author)

## 2018-01-14 VITALS
DIASTOLIC BLOOD PRESSURE: 74 MMHG | BODY MASS INDEX: 21.41 KG/M2 | TEMPERATURE: 97.2 F | HEART RATE: 87 BPM | SYSTOLIC BLOOD PRESSURE: 118 MMHG | OXYGEN SATURATION: 98 % | WEIGHT: 145 LBS

## 2018-01-15 VITALS
BODY MASS INDEX: 21.75 KG/M2 | DIASTOLIC BLOOD PRESSURE: 64 MMHG | TEMPERATURE: 98.1 F | SYSTOLIC BLOOD PRESSURE: 102 MMHG | WEIGHT: 147.31 LBS | RESPIRATION RATE: 18 BRPM | HEART RATE: 104 BPM | OXYGEN SATURATION: 99 %

## 2018-01-15 NOTE — RESULT NOTES
Discussion/Summary   SUMMARY OF RESULTS: Everything fine except for significantly elevated total and LDL ("bad") cholesterol, up from previous  At this point, in addition to watching your diet (increased soluble fiber, decreased "bad" fats), you would benefit from an Rx cholesterol medication (such as Lipitor)  I believe we may have prescribed this back in 2015  Assuming you had no issues with it, I will go ahead and send a new Rx to the pharmacy  Will mail lab slip to recheck your cholesterol level 2 months after starting medication  Call if you have any questions--Haris      Verified Results  (Q) LIPID PANEL WITH REFLEX TO DIRECT LDL 57VBA1184 09:14AM Arminda Fry     Test Name Result Flag Reference   CHOLESTEROL, TOTAL 252 mg/dL H <200   HDL CHOLESTEROL 46 mg/dL L >67   TRIGLICERIDES 63 mg/dL  <747   LDL-CHOLESTEROL 190 mg/dL (calc) H    LDL-C levels > or = 190 mg/dL may indicate familial   hypercholesterolemia (FH)  Clinical assessment and   measurement of blood lipid levels should be considered  for all first degree relatives of patients with an   FH diagnosis  Emily Pulido et al  J National Lipid   Association Recommendations for Patient-Centered   Management of Dyslipidemia: Part 1 Journal of Clinical   Lipidology 2015;9(2), 129-169  Reference range: <100     Desirable range <100 mg/dL for patients with CHD or  diabetes and <70 mg/dL for diabetic patients with  known heart disease  LDL-C is now calculated using the [place]   calculation, which is a validated novel method providing   better accuracy than the Friedewald equation in the   estimation of LDL-C  Jenyn ChambersBaystate [facility] Center92;610(47): 1645-9050   (http://TeeBeeDee/faq/DRC410)   CHOL/HDLC RATIO 5 5 (calc) H <5 0   NON HDL CHOLESTEROL 206 mg/dL (calc) H <130   For patients with diabetes plus 1 major ASCVD risk   factor, treating to a non-HDL-C goal of <100 mg/dL   (LDL-C of <70 mg/dL) is considered a therapeutic option  (1) COMPREHENSIVE METABOLIC PANEL 57WVK6657 70:99TM Alize Bella     Test Name Result Flag Reference   GLUCOSE 78 mg/dL  65-99   Fasting reference interval   UREA NITROGEN (BUN) 21 mg/dL  7-25   CREATININE 0 83 mg/dL  0 50-1 10   eGFR NON-AFR   AMERICAN 84 mL/min/1 73m2  > OR = 60   eGFR AFRICAN AMERICAN 97 mL/min/1 73m2  > OR = 60   BUN/CREATININE RATIO   7-51   NOT APPLICABLE (calc)   SODIUM 140 mmol/L  135-146   POTASSIUM 3 8 mmol/L  3 5-5 3   CHLORIDE 106 mmol/L     CARBON DIOXIDE 28 mmol/L  20-31   CALCIUM 9 3 mg/dL  8 6-10 2   PROTEIN, TOTAL 7 3 g/dL  6 1-8 1   ALBUMIN 4 5 g/dL  3 6-5 1   GLOBULIN 2 8 g/dL (calc)  1 9-3 7   ALBUMIN/GLOBULIN RATIO 1 6 (calc)  1 0-2 5   BILIRUBIN, TOTAL 0 5 mg/dL  0 2-1 2   ALKALINE PHOSPHATASE 82 U/L     AST 19 U/L  10-35   ALT 15 U/L  6-29     (1) CBC/PLT/DIFF 22HVN7951 09:14AM Alize SkyKick     Test Name Result Flag Reference   WHITE BLOOD CELL COUNT 4 0 Thousand/uL  3 8-10 8   RED BLOOD CELL COUNT 4 06 Million/uL  3 80-5 10   HEMOGLOBIN 12 5 g/dL  11 7-15 5   HEMATOCRIT 37 2 %  35 0-45 0   MCV 91 6 fL  80 0-100 0   MCH 30 8 pg  27 0-33 0   MCHC 33 6 g/dL  32 0-36 0   RDW 12 2 %  11 0-15 0   PLATELET COUNT 276 Thousand/uL  140-400   ABSOLUTE NEUTROPHILS 2084 cells/uL  1264-9658   ABSOLUTE LYMPHOCYTES 1228 cells/uL  850-3900   ABSOLUTE MONOCYTES 348 cells/uL  200-950   ABSOLUTE EOSINOPHILS 280 cells/uL     ABSOLUTE BASOPHILS 60 cells/uL  0-200   NEUTROPHILS 52 1 %     LYMPHOCYTES 30 7 %     MONOCYTES 8 7 %     EOSINOPHILS 7 0 %     BASOPHILS 1 5 %     MPV 12 0 fL  7 5-12 5     (Q) HEMOGLOBIN A1c 81RVU2919 09:14AM [employer]     Test Name Result Flag Reference   HEMOGLOBIN A1c 5 1 % of total Hgb  <5 7   For the purpose of screening for the presence of  diabetes:     <5 7%       Consistent with the absence of diabetes  5 7-6 4%    Consistent with increased risk for diabetes              (prediabetes)  > or =6 5%  Consistent with diabetes     This assay result is consistent with a decreased risk  of diabetes  Currently, no consensus exists regarding use of  hemoglobin A1c for diagnosis of diabetes in children  According to American Diabetes Association (ADA)  guidelines, hemoglobin A1c <7 0% represents optimal  control in non-pregnant diabetic patients  Different  metrics may apply to specific patient populations  Standards of Medical Care in Diabetes(ADA)  (Q) URINALYSIS REFLEX 55NII7033 09:14AM Viola Jimenez   REPORT COMMENT:  FASTING:YES     Test Name Result Flag Reference   COLOR YELLOW  YELLOW   APPEARANCE CLEAR  CLEAR   SPECIFIC GRAVITY 1 024  1 001-1 035   PH 6 0  5 0-8 0   GLUCOSE NEGATIVE  NEGATIVE   BILIRUBIN NEGATIVE  NEGATIVE   KETONES NEGATIVE  NEGATIVE   OCCULT BLOOD NEGATIVE  NEGATIVE   PROTEIN NEGATIVE  NEGATIVE   NITRITE NEGATIVE  NEGATIVE   LEUKOCYTE ESTERASE 1+ A NEGATIVE   WBC 10-20 /HPF A < OR = 5   RBC NONE SEEN /HPF  < OR = 2   SQUAMOUS EPITHELIAL CELLS 0-5 /HPF  < OR = 5   BACTERIA NONE SEEN /HPF  NONE SEEN   HYALINE CAST NONE SEEN /LPF  NONE SEEN       Plan  Hyperlipidemia    · Atorvastatin Calcium 20 MG Oral Tablet; Take 1 tablet daily   · (1) COMPREHENSIVE METABOLIC PANEL; Status:Active; Requested LTZ:82VZC2206;    · (1) LIPID PANEL FASTING W DIRECT LDL REFLEX; Status:Active;  Requested  MUX:79OED9492;

## 2018-01-15 NOTE — MISCELLANEOUS
Message  Return to work or school:   Jw Olivier is under my professional care  She was seen in my office on 8/24/17       Please excuse from work today due to medical concern  May stay home tomorrow if no better  Kari SILVA  Signatures   Electronically signed by : RICARDO Aguilar;  Aug 24 2017 11:06AM EST                       (Author)

## 2018-01-22 VITALS
DIASTOLIC BLOOD PRESSURE: 78 MMHG | SYSTOLIC BLOOD PRESSURE: 130 MMHG | WEIGHT: 149.25 LBS | BODY MASS INDEX: 22.04 KG/M2 | OXYGEN SATURATION: 98 % | TEMPERATURE: 98.5 F | HEART RATE: 95 BPM

## 2018-01-22 VITALS
HEIGHT: 68 IN | SYSTOLIC BLOOD PRESSURE: 124 MMHG | DIASTOLIC BLOOD PRESSURE: 80 MMHG | TEMPERATURE: 98.6 F | BODY MASS INDEX: 23.04 KG/M2 | WEIGHT: 152 LBS | HEART RATE: 95 BPM | OXYGEN SATURATION: 98 %

## 2018-01-23 VITALS
SYSTOLIC BLOOD PRESSURE: 126 MMHG | BODY MASS INDEX: 21.19 KG/M2 | HEIGHT: 70 IN | DIASTOLIC BLOOD PRESSURE: 72 MMHG | WEIGHT: 148 LBS | HEART RATE: 99 BPM

## 2018-01-23 NOTE — RESULT NOTES
Discussion/Summary   Please inform the patient that biopsies from stomach were negative for H pylori, biopsies from duodenum were normal, no evidence of celiac sprue  I am awaiting the results of upper GI series which I have ordered, please make sure that this test gets completed  Please have her follow up in the office in 3 months, sooner if needed  Verified Results  (1) TISSUE EXAM [id number] 08:42AM Law Duran     Test Name Result Flag Reference   LAB AP CASE REPORT (Report)     Surgical Pathology Report             Case: [id number]                   Authorizing Provider: Mercedes Yadav MD      Collected:      12/27/2017 0842        Ordering Location:   [facility]    Received:      12/28/2017 [address]                            Pathologist:      Dasha Mcfadden MD                              Specimens:  A) - Duodenum, Bx Duodenum                                       B) - Stomach, Bx stomach body gastritis   LAB AP FINAL DIAGNOSIS (Report)     A  Duodenum:  - Benign duodenal mucosa without specific histopathologic abnormality   - No villous atrophy, no intraepithelial lymphocytosis or crypt   hyperplasia to suggest malabsorptive enteropathy   - No chronic or active duodenitis  - No glandular dysplasia and no evidence of malignancy  B  Stomach body:  - Chronic inactive oxyntic gastritis, negative for curvilinear   Helicobacter pylori, confirmed by immunohistochemical stains, evaluated   with appropriate positive & negative controls  - No atrophy or intestinal metaplasia identified   - No epithelial dysplasia and no evidence of malignancy  Electronically signed by Dasha Mcfadden MD on 12/29/2017 at 5:03 PM   LAB AP SURGICAL ADDITIONAL INFORMATION (Report)     All controls performed with the immunohistochemical stains reported above   reacted appropriately   These tests were developed and their performance   characteristics determined by [facility] or   [address] [place] [address]  They may not be cleared or approved by the [place]  Food and Drug Administration  The FDA has determined that such clearance   or approval is not necessary  These tests are used for clinical purposes  They should not be regarded as investigational or for research  This   laboratory has been approved by [place] [address], designated as a high-complexity   laboratory and is qualified to perform these tests  LAB AP GROSS DESCRIPTION (Report)     A  The specimen is received in formalin, labeled with the patient's name   and hospital number, and is designated biopsy duodenum  The specimen   consists of multiple tan, irregular soft tissue fragments measuring   0 1-0 4 cm in greatest dimension, and approximately 0 2 cc in aggregate  Entirely submitted  One cassette  B  The specimen is received in formalin, labeled with the patient's name   and hospital number, and is designated biopsy stomach body gastritis  The specimen consists 5 tan, irregular, soft tissue fragments measuring   0 1-0 4 cm in greatest dimension  Entirely submitted  One cassette  Note: The estimated total formalin fixation time based upon information   provided by the submitting clinician and the standard processing schedule   is under 72 hours   Caleb   LAB AP CLINICAL INFORMATION R/o celiac     R/o celiac

## 2018-01-23 NOTE — MISCELLANEOUS
Dear Magdalena Cotter,  I am happy to report that your Pap test collected during your recent  exam was normal  The HPV test was negative  Based on the most recent guidelines, you will be due for your next Pap test and HPV testing in 3 years  However, I will continue to see you annually for your Well Women visit  If you have any questions,  please do not hesitate to contact my office      Sincerely,    Zain Anthony MD

## 2018-01-23 NOTE — RESULT NOTES
Verified Results  (1) THIN PREP PAP WITH IMAGING [id number] 04:54PM Dimitri Kennedy Order Number: [id number]     Test Name Result Flag Reference   LAB AP CASE REPORT (Report)     Gynecologic Cytology Report            Case: [id number]                  Authorizing Provider: Teodoro Hayward MD    Collected:      12/05/2017 1654        First Screen:     OSVALDO De Oliveira    Received:      12/12/2017 0827        Specimen:  LIQUID-BASED PAP, SCREENING, Endocervical   HPV HIGH RISK RESULT (Report)     HPV, High Risk: HPV NEG, HPV16 NEG, HPV18 NEG      Other High Risk HPV Negative, HPV 16 Negative, HPV 18 Negative  HPV types: 16,18,31,33,35,39,45,51,52,56,58,59,66 and 68 DNA are undetectable or below the pre-set threshold  Moya Okrug[employer] FDA approved Shahzad 4800 is utilized with strict adherence to the manufacturers instruction  manual to test for the presence of High-Risk HPV DNA, as well as HPV 16 and HPV 18  This instrument  has been validated by our laboratory and/or by the   A negative result does not preclude the presence of HPV infection because results depend on adequate  specimen collection, absence of inhibitors and sufficient DNA to be detected  Additionally, HPV negative  results are not intended to prevent women from proceeding to colposcopy if clinically warranted  Positive HPV test results indicate the presence of any one or more of the high risk types, but since patients  are often co-infected with low-risk types it does not rule out the presence of low-risk types in patients  with mixed infections  LAB AP GYN PRIMARY INTERPRETATION      Negative for intraepithelial lesion or malignancy  Electronically signed by OSVALDO De Oliveira on 12/13/2017 at 4:18 PM   LAB AP GYN SPECIMEN ADEQUACY      Satisfactory for evaluation  Endocervical/transformation zone component present     LAB AP GYN ADDITIONAL INFORMATION (Report)     [employer]'s FDA approved ,  and ThinPrep Imaging System are   utilized with strict adherence to the 's instruction manual to   prepare gynecologic and non-gynecologic cytology specimens for the   production of ThinPrep slides as well as for gynecologic ThinPrep imaging  These processes have been validated by our laboratory and/or by the     The Pap test is not a diagnostic procedure and should not be used as the   sole means to detect cervical cancer  It is only a screening procedure to   aid in the detection of cervical cancer and its precursors  Both   false-negative and false-positive results have been experienced  Your   patient's test result should be interpreted in this context together with   the history and clinical findings         Signatures   Electronically signed by : QUE Martins ; Dec 14 2017 11:53AM EST                       (Author)

## 2018-01-29 DIAGNOSIS — E78.5 HYPERLIPIDEMIA: ICD-10-CM

## 2018-03-02 ENCOUNTER — OFFICE VISIT (OUTPATIENT)
Dept: URGENT CARE | Age: 48
End: 2018-03-02
Payer: COMMERCIAL

## 2018-03-02 VITALS
BODY MASS INDEX: 21.47 KG/M2 | RESPIRATION RATE: 18 BRPM | HEIGHT: 70 IN | TEMPERATURE: 99 F | OXYGEN SATURATION: 97 % | DIASTOLIC BLOOD PRESSURE: 71 MMHG | WEIGHT: 150 LBS | SYSTOLIC BLOOD PRESSURE: 109 MMHG | HEART RATE: 130 BPM

## 2018-03-02 DIAGNOSIS — J11.1 INFLUENZA: Primary | ICD-10-CM

## 2018-03-02 PROCEDURE — G0382 LEV 3 HOSP TYPE B ED VISIT: HCPCS | Performed by: FAMILY MEDICINE

## 2018-03-02 RX ORDER — OSELTAMIVIR PHOSPHATE 75 MG/1
75 CAPSULE ORAL EVERY 12 HOURS SCHEDULED
Qty: 10 CAPSULE | Refills: 0 | Status: SHIPPED | OUTPATIENT
Start: 2018-03-02 | End: 2018-03-07

## 2018-03-02 RX ORDER — LEVOTHYROXINE SODIUM 0.07 MG/1
75 TABLET ORAL DAILY
COMMUNITY
End: 2018-10-23

## 2018-03-02 NOTE — PATIENT INSTRUCTIONS
Influenza   WHAT YOU NEED TO KNOW:   Influenza (the flu) is an infection caused by the influenza virus  The flu is easily spread when an infected person coughs, sneezes, or has close contact with others  You may be able to spread the flu to others for 1 week or longer after signs or symptoms appear  DISCHARGE INSTRUCTIONS:   Call 911 for any of the following:   · You have trouble breathing, and your lips look purple or blue  · You have a seizure  Return to the emergency department if:   · You are dizzy, or you are urinating less or not at all  · You have a headache with a stiff neck, and you feel tired or confused  · You have new pain or pressure in your chest     · Your symptoms, such as shortness of breath, vomiting, or diarrhea, get worse  · Your symptoms, such as fever and coughing, seem to get better, but then get worse  Contact your healthcare provider if:   · You have new muscle pain or weakness  · You have questions or concerns about your condition or care  Medicines: You may need any of the following:  · Acetaminophen  decreases pain and fever  It is available without a doctor's order  Ask how much to take and how often to take it  Follow directions  Acetaminophen can cause liver damage if not taken correctly  · NSAIDs , such as ibuprofen, help decrease swelling, pain, and fever  This medicine is available with or without a doctor's order  NSAIDs can cause stomach bleeding or kidney problems in certain people  If you take blood thinner medicine, always ask your healthcare provider if NSAIDs are safe for you  Always read the medicine label and follow directions  · Antivirals  help fight a viral infection  · Take your medicine as directed  Contact your healthcare provider if you think your medicine is not helping or if you have side effects  Tell him or her if you are allergic to any medicine  Keep a list of the medicines, vitamins, and herbs you take   Include the amounts, and when and why you take them  Bring the list or the pill bottles to follow-up visits  Carry your medicine list with you in case of an emergency  Rest  as much as you can to help you recover  Drink liquids as directed  to help prevent dehydration  Ask how much liquid to drink each day and which liquids are best for you  Prevent the spread of influenza:   · Wash your hands often  Use soap and water  Wash your hands after you use the bathroom, change a child's diapers, or sneeze  Wash your hands before you prepare or eat food  Use gel hand cleanser when soap and water are not available  Do not touch your eyes, nose, or mouth unless you have washed your hands first            · Cover your mouth when you sneeze or cough  Cough into a tissue or the bend of your arm  · Clean shared items with a germ-killing   Clean table surfaces, doorknobs, and light switches  Do not share towels, silverware, and dishes with people who are sick  Wash bed sheets, towels, silverware, and dishes with soap and water  · Wear a mask  over your mouth and nose if you are sick or are near anyone who is sick  · Stay away from others  if you are sick  · Influenza vaccine  helps prevent influenza (flu)  Everyone older than 6 months should get a yearly influenza vaccine  Get the vaccine as soon as it is available, usually in September or October each year  Follow up with your healthcare provider as directed:  Write down your questions so you remember to ask them during your visits  © 2017 [employer] Information is for End User's use only and may not be sold, redistributed or otherwise used for commercial purposes  All illustrations and images included in CareNotes® are the copyrighted property of [email or web address] , [employer]  or Milton Sandoval  The above information is an  only  It is not intended as medical advice for individual conditions or treatments   Talk to your doctor, nurse or pharmacist before following any medical regimen to see if it is safe and effective for you  1   Drink plenty fluids  2   Humidifier at bedtime    3  Over-the-counter cough and cold medications as needed for symptomatic care  4    Advance activities as tolerated  5    Follow-up with your primary care physician in 3-4 days  6   Go to emergency room if symptoms are worsening

## 2018-03-02 NOTE — LETTER
March 2, 2018     Patient: Guillermo Kim   YOB: 1970   Date of Visit: 3/2/2018       To Whom It May Concern:    Please excuse Gloria Syed from work 03/01/2018 through 03/06/2018 due to illness           Sincerely,        Jose Guillaume PA-C    CC: No Recipients

## 2018-03-02 NOTE — PROGRESS NOTES
[facility]        NAME: Samual Goodpasture is a 52 y o  female  : 1970    MRN: [id number]  DATE: 2018  TIME: 10:27 AM    Assessment and Plan   Influenza [J11 1]  1  Influenza  oseltamivir (TAMIFLU) 75 mg capsule         Patient Instructions       Follow up with PCP in 3-5 days  Proceed to  ER if symptoms worsen  Chief Complaint     Chief Complaint   Patient presents with    Cold Like Symptoms         History of Present Illness       Patient is here for evaluation cough, headache, chills, body aches  Symptoms started about 2 days ago  She did have some mild nausea but denies any vomiting  Spoke with patient on the phone that the Tamiflu is expensive  I advised there is no other medication that can be switched for the Tamiflu  I advised her to take the Tamiflu as directed  Review of Systems   Review of Systems   Constitutional: Positive for chills and fever  HENT: Positive for congestion and sore throat  Negative for ear pain, rhinorrhea, sinus pain and sinus pressure  Eyes: Negative  Respiratory: Positive for cough  Negative for shortness of breath and wheezing  Cardiovascular: Negative            Current Medications       Current Outpatient Prescriptions:     levothyroxine 75 mcg tablet, Take 75 mcg by mouth daily, Disp: , Rfl:     oseltamivir (TAMIFLU) 75 mg capsule, Take 1 capsule (75 mg total) by mouth every 12 (twelve) hours for 5 days, Disp: 10 capsule, Rfl: 0    ranitidine (ZANTAC) 150 MG/10ML syrup, Take by mouth continuous as needed for indigestion or heartburn, Disp: , Rfl:     sertraline (ZOLOFT) 100 mg tablet, Take 50 mg by mouth daily at bedtime, Disp: , Rfl:     Current Allergies     Allergies as of 2018 - Reviewed 2018   Allergen Reaction Noted    Biaxin [clarithromycin] Diarrhea 2017    Cefzil [cefprozil] Dizziness 2017    Flagyl [metronidazole] Nausea Only 2017    Percocet [oxycodone-acetaminophen] Nausea Only 12/22/2017    Latex Rash 12/22/2017            The following portions of the patient's history were reviewed and updated as appropriate: allergies, current medications, past family history, past medical history, past social history, past surgical history and problem list      Past Medical History:   Diagnosis Date    Anxiety     Disease of thyroid gland     GERD (gastroesophageal reflux disease)     Headache     Hyperlipidemia        Past Surgical History:   Procedure Laterality Date    DILATION AND CURETTAGE OF UTERUS      EAR SURGERY      age 6    LA ESOPHAGOGASTRODUODENOSCOPY TRANSORAL DIAGNOSTIC N/A 12/27/2017    Procedure: ESOPHAGOGASTRODUODENOSCOPY (EGD); Surgeon: Vanesa Iniguez MD;  Location: [facility]; Service: Gastroenterology    VEIN LIGATION AND STRIPPING Bilateral     WISDOM TOOTH EXTRACTION         No family history on file  Medications have been verified  Objective   /71   Pulse (!) 130   Temp 99 °F (37 2 °C) (Temporal)   Resp 18   Ht 5' 10" (1 778 m)   Wt 68 kg (150 lb)   SpO2 97%   BMI 21 52 kg/m²        Physical Exam     Physical Exam   Constitutional: She appears well-developed and well-nourished  No distress  HENT:   Head: Normocephalic and atraumatic  Right Ear: External ear normal    Left Ear: External ear normal    Bilateral nasal congestion and erythema  No mucopurulent drainage  Bilateral tonsillar erythema with no soft tissue swelling  No exudate  Eyes: EOM are normal  Pupils are equal, round, and reactive to light  Cardiovascular: Normal rate, regular rhythm and normal heart sounds  No murmur heard  Pulmonary/Chest: Effort normal and breath sounds normal    Lymphadenopathy:     She has no cervical adenopathy  Skin: Skin is warm and dry  Psychiatric: She has a normal mood and affect  Her behavior is normal    Nursing note and vitals reviewed

## 2018-03-06 DIAGNOSIS — J20.9 ACUTE BRONCHITIS, UNSPECIFIED ORGANISM: Primary | ICD-10-CM

## 2018-03-06 RX ORDER — AZITHROMYCIN 250 MG/1
TABLET, FILM COATED ORAL
Qty: 6 TABLET | Refills: 0 | Status: SHIPPED | OUTPATIENT
Start: 2018-03-06 | End: 2018-03-10

## 2018-03-26 ENCOUNTER — DOCTOR'S OFFICE (OUTPATIENT)
Dept: URBAN - METROPOLITAN AREA CLINIC 137 | Facility: CLINIC | Age: 48
Setting detail: OPHTHALMOLOGY
End: 2018-03-26
Payer: COMMERCIAL

## 2018-03-26 DIAGNOSIS — H01.002: ICD-10-CM

## 2018-03-26 DIAGNOSIS — H01.001: ICD-10-CM

## 2018-03-26 DIAGNOSIS — H01.005: ICD-10-CM

## 2018-03-26 DIAGNOSIS — H04.123: ICD-10-CM

## 2018-03-26 DIAGNOSIS — H01.004: ICD-10-CM

## 2018-03-26 PROBLEM — H25.13 CATARACT NUCLEAR SCLEROSIS: Status: ACTIVE | Noted: 2017-12-22

## 2018-03-26 PROBLEM — G43.109 MIGRAINE WITH AURA, NOT INTRACTABLE, WITHOUT STATUS MIGRAINOSUS: Status: ACTIVE | Noted: 2017-11-29

## 2018-03-26 PROCEDURE — 83861 MICROFLUID ANALY TEARS: CPT | Performed by: OPHTHALMOLOGY

## 2018-03-26 PROCEDURE — AVENOVA FA AVENOVA FACE CLEANSER: Performed by: OPHTHALMOLOGY

## 2018-03-26 PROCEDURE — 99213 OFFICE O/P EST LOW 20 MIN: CPT | Performed by: OPHTHALMOLOGY

## 2018-03-26 ASSESSMENT — REFRACTION_CURRENTRX
OD_CYLINDER: +1.50
OS_OVR_VA: 20/
OS_OVR_VA: 20/
OD_VPRISM_DIRECTION: SV
OD_OVR_VA: 20/
OD_AXIS: 150
OS_OVR_VA: 20/
OD_OVR_VA: 20/
OS_AXIS: 176
OS_SPHERE: -1.75
OS_VPRISM_DIRECTION: SV
OD_SPHERE: -1.75
OS_CYLINDER: +1.75
OD_OVR_VA: 20/

## 2018-03-26 ASSESSMENT — REFRACTION_AUTOREFRACTION
OD_SPHERE: -2.00
OS_AXIS: 177
OD_AXIS: 156
OS_SPHERE: -2.00
OD_CYLINDER: +1.25
OS_CYLINDER: +2.00

## 2018-03-26 ASSESSMENT — REFRACTION_MANIFEST
OD_VA2: 20/
OD_VA2: 20/
OS_VA1: 20/
OD_VA3: 20/
OD_VA1: 20/
OS_VA2: 20/
OS_VA2: 20/
OS_VA1: 20/
OS_VA3: 20/
OS_VA3: 20/
OD_VA3: 20/
OS_VA1: 20/
OD_VA3: 20/
OS_VA3: 20/
OD_VA1: 20/
OU_VA: 20/
OU_VA: 20/
OS_VA2: 20/
OD_VA2: 20/
OU_VA: 20/
OD_VA1: 20/

## 2018-03-26 ASSESSMENT — SPHEQUIV_DERIVED
OD_SPHEQUIV: -1.375
OS_SPHEQUIV: -1

## 2018-03-26 ASSESSMENT — VISUAL ACUITY
OS_BCVA: 20/20
OD_BCVA: 20/20

## 2018-03-26 ASSESSMENT — LID EXAM ASSESSMENTS
OD_BLEPHARITIS: RLL RUL 2+
OS_BLEPHARITIS: LLL LUL 2+

## 2018-03-26 ASSESSMENT — CONFRONTATIONAL VISUAL FIELD TEST (CVF)
OD_FINDINGS: FULL
OS_FINDINGS: FULL

## 2018-04-20 ENCOUNTER — OFFICE VISIT (OUTPATIENT)
Dept: FAMILY MEDICINE CLINIC | Facility: OTHER | Age: 48
End: 2018-04-20
Payer: COMMERCIAL

## 2018-04-20 VITALS
HEIGHT: 69 IN | DIASTOLIC BLOOD PRESSURE: 80 MMHG | WEIGHT: 150 LBS | TEMPERATURE: 98.5 F | OXYGEN SATURATION: 97 % | BODY MASS INDEX: 22.22 KG/M2 | HEART RATE: 94 BPM | SYSTOLIC BLOOD PRESSURE: 110 MMHG

## 2018-04-20 DIAGNOSIS — H60.311 ACUTE DIFFUSE OTITIS EXTERNA OF RIGHT EAR: ICD-10-CM

## 2018-04-20 DIAGNOSIS — J01.00 ACUTE NON-RECURRENT MAXILLARY SINUSITIS: ICD-10-CM

## 2018-04-20 DIAGNOSIS — L30.9 DERMATITIS: Primary | ICD-10-CM

## 2018-04-20 PROBLEM — G43.909 MIGRAINES: Status: ACTIVE | Noted: 2017-12-05

## 2018-04-20 PROBLEM — E28.319 EARLY MENOPAUSE: Status: ACTIVE | Noted: 2017-12-05

## 2018-04-20 PROBLEM — J01.40 ACUTE NON-RECURRENT PANSINUSITIS: Status: ACTIVE | Noted: 2017-12-26

## 2018-04-20 PROBLEM — E73.9 LACTOSE INTOLERANCE: Status: ACTIVE | Noted: 2017-12-05

## 2018-04-20 PROBLEM — H10.10 ALLERGIC CONJUNCTIVITIS: Status: ACTIVE | Noted: 2017-12-18

## 2018-04-20 PROBLEM — J30.9 ALLERGIC RHINITIS: Status: ACTIVE | Noted: 2017-02-27

## 2018-04-20 PROBLEM — H60.60 CHRONIC OTITIS EXTERNA: Status: ACTIVE | Noted: 2017-12-18

## 2018-04-20 PROBLEM — L25.5 RHUS DERMATITIS: Status: ACTIVE | Noted: 2017-08-22

## 2018-04-20 PROBLEM — M54.50 LOWER BACK PAIN: Status: ACTIVE | Noted: 2017-08-14

## 2018-04-20 PROCEDURE — 99214 OFFICE O/P EST MOD 30 MIN: CPT | Performed by: FAMILY MEDICINE

## 2018-04-20 RX ORDER — AMOXICILLIN 875 MG/1
875 TABLET, COATED ORAL 2 TIMES DAILY
Qty: 20 TABLET | Refills: 0 | Status: SHIPPED | OUTPATIENT
Start: 2018-04-20 | End: 2018-04-30

## 2018-04-20 RX ORDER — CIPROFLOXACIN AND DEXAMETHASONE 3; 1 MG/ML; MG/ML
4 SUSPENSION/ DROPS AURICULAR (OTIC) 2 TIMES DAILY
Qty: 7.5 ML | Refills: 0 | Status: SHIPPED | OUTPATIENT
Start: 2018-04-20 | End: 2018-09-07

## 2018-04-20 RX ORDER — LEVOTHYROXINE SODIUM 75 MCG
75 TABLET ORAL
COMMUNITY
End: 2019-05-17 | Stop reason: SDUPTHER

## 2018-04-20 NOTE — PROGRESS NOTES
Assessment/Plan:           Problem List Items Addressed This Visit     None      Visit Diagnoses     Dermatitis    -  Primary    Relevant Medications    triamcinolone (KENALOG) 0 1 % ointment  Advised to use it sparingly on hands and for 2 weeks period at a time  Keep hands moisturized  Use mild soaps       Acute non-recurrent maxillary sinusitis        Relevant Medications    amoxicillin (AMOXIL) 875 mg tablet  Stay hydrated and take OTC antihistamine as well daily  May take OTC ibupofen with food prn pain       Acute diffuse otitis externa of right ear        Relevant Medications    ciprofloxacin-dexamethasone (CIPRODEX) otic suspension    Avoid using Q-tips               Subjective:      Patient ID: Fredis Ahumada is a 52 y o  female  Sinusitis   This is a new problem  The current episode started in the past 7 days  The problem has been gradually worsening since onset  There has been no fever  The pain is moderate  Associated symptoms include chills, congestion, coughing, ear pain, headaches, sinus pressure, sneezing and a sore throat  Pertinent negatives include no neck pain, shortness of breath or swollen glands  (She has nasal congestion and drainage of purulent discharge  Has been feeing sinus pain and teeth ache in the past few days   ) Past treatments include acetaminophen (otc allergy medication )  The treatment provided no relief  Rash   This is a recurrent problem  The current episode started 1 to 4 weeks ago  The problem has been gradually worsening since onset  The affected locations include the right hand and face  The rash is characterized by dryness, redness, itchiness, peeling and scaling  It is unknown if there was an exposure to a precipitant  Associated symptoms include congestion, coughing and a sore throat  Pertinent negatives include no shortness of breath   Past treatments include antihistamine and oral steroids (she has taken prednisone from UC visit in the past month which helped with the face rash but didn't help the hand rash and its been getting worse  )  The treatment provided mild relief  The following portions of the patient's history were reviewed and updated as appropriate: allergies, current medications, past family history, past medical history, past social history, past surgical history and problem list     Review of Systems   Constitutional: Positive for chills  HENT: Positive for congestion, ear pain, sinus pressure, sneezing and sore throat  Respiratory: Positive for cough  Negative for shortness of breath  Musculoskeletal: Negative for neck pain  Skin: Positive for rash  Negative for pallor  Neurological: Positive for headaches  Objective:      /80 (BP Location: Left arm, Patient Position: Sitting, Cuff Size: Adult)   Pulse 94   Temp 98 5 °F (36 9 °C) (Tympanic)   Ht 5' 9 07" (1 754 m)   Wt 68 kg (150 lb)   SpO2 97%   BMI 22 11 kg/m²          Physical Exam   Constitutional: She is oriented to person, place, and time  She appears well-developed and well-nourished  No distress  HENT:   Head: Normocephalic and atraumatic  Left Ear: External ear normal    Mouth/Throat: Oropharynx is clear and moist  No oropharyngeal exudate  Nasal mucosa with erythema and purulence and edema  + sinus tenderness to percussion of cheeks  TM's with effusion bilaterally  Right external ear canal with erythema and edema and tender with exam    Eyes: Right eye exhibits no discharge  Left eye exhibits no discharge  No scleral icterus  Neck: Normal range of motion  Neck supple  No thyromegaly present  Cardiovascular: Normal rate, regular rhythm and normal heart sounds  No murmur heard  Pulmonary/Chest: Effort normal and breath sounds normal  No respiratory distress  She has no wheezes  Abdominal: Soft  Bowel sounds are normal  She exhibits no distension  There is no tenderness  Lymphadenopathy:     She has no cervical adenopathy     Neurological: She is alert and oriented to person, place, and time  No cranial nerve deficit  Skin: Skin is warm and dry  No rash noted  She is not diaphoretic  Psychiatric: She has a normal mood and affect  Her behavior is normal    Nursing note and vitals reviewed

## 2018-04-20 NOTE — PATIENT INSTRUCTIONS
Sinusitis   AMBULATORY CARE:   Sinusitis  is inflammation or infection of your sinuses  It is most often caused by a virus  Acute sinusitis may last up to 12 weeks  Chronic sinusitis lasts longer than 12 weeks  Recurrent sinusitis means you have 4 or more times in 1 year  Common symptoms include the following:   · Fever    · Pain, pressure, redness, or swelling around the forehead, cheeks, or eyes    · Thick yellow or green discharge from your nose    · Tenderness when you touch your face over your sinuses    · Dry cough that happens mostly at night or when you lie down    · Headache and face pain that is worse when you lean forward    · Tooth pain, or pain when you chew  Seek care immediately if:   · Your eye and eyelid are red, swollen, and painful  · You cannot open your eye  · You have vision changes, such as double vision  · Your eyeball bulges out or you cannot move your eye  · You are more sleepy than normal, or you notice changes in your ability to think, move, or talk  · You have a stiff neck, a fever, or a bad headache  · You have swelling of your forehead or scalp  Contact your healthcare provider if:   · Your symptoms do not improve after 3 days  · Your symptoms do not go away after 10 days  · You have nausea and are vomiting  · Your nose is bleeding  · You have questions or concerns about your condition or care  Treatment for sinusitis:  Your symptoms may go away on their own  Your healthcare provider may recommend watchful waiting for up to 10 days before starting antibiotics  You may  need any of the following:  · Acetaminophen  decreases pain and fever  It is available without a doctor's order  Ask how much to take and how often to take it  Follow directions  Read the labels of all other medicines you are using to see if they also contain acetaminophen, or ask your doctor or pharmacist  Acetaminophen can cause liver damage if not taken correctly   Do not use more than 4 grams (4,000 milligrams) total of acetaminophen in one day  · NSAIDs , such as ibuprofen, help decrease swelling, pain, and fever  This medicine is available with or without a doctor's order  NSAIDs can cause stomach bleeding or kidney problems in certain people  If you take blood thinner medicine, always ask your healthcare provider if NSAIDs are safe for you  Always read the medicine label and follow directions  · Nasal steroid sprays  may help decrease inflammation in your nose and sinuses  · Decongestants  help reduce swelling and drain mucus in the nose and sinuses  They may help you breathe easier  · Antihistamines  help dry mucus in the nose and relieve sneezing  · Antibiotics  help treat or prevent a bacterial infection  · Take your medicine as directed  Contact your healthcare provider if you think your medicine is not helping or if you have side effects  Tell him or her if you are allergic to any medicine  Keep a list of the medicines, vitamins, and herbs you take  Include the amounts, and when and why you take them  Bring the list or the pill bottles to follow-up visits  Carry your medicine list with you in case of an emergency  Self-care:   · Rinse your sinuses  Use a sinus rinse device to rinse your nasal passages with a saline (salt water) solution or distilled water  Do not use tap water  This will help thin the mucus in your nose and rinse away pollen and dirt  It will also help reduce swelling so you can breathe normally  Ask your healthcare provider how often to do this  · Breathe in steam   Heat a bowl of water until you see steam  Lean over the bowl and make a tent over your head with a large towel  Breathe deeply for about 20 minutes  Be careful not to get too close to the steam or burn yourself  Do this 3 times a day  You can also breathe deeply when you take a hot shower  · Sleep with your head elevated    Place an extra pillow under your head before you go to sleep to help your sinuses drain  · Drink liquids as directed  Ask your healthcare provider how much liquid to drink each day and which liquids are best for you  Liquids will thin the mucus in your nose and help it drain  Avoid drinks that contain alcohol or caffeine  · Do not smoke, and avoid secondhand smoke  Nicotine and other chemicals in cigarettes and cigars can make your symptoms worse  Ask your healthcare provider for information if you currently smoke and need help to quit  E-cigarettes or smokeless tobacco still contain nicotine  Talk to your healthcare provider before you use these products  Prevent the spread of germs that cause sinusitis:  Wash your hands often with soap and water  Wash your hands after you use the bathroom, change a child's diaper, or sneeze  Wash your hands before you prepare or eat food  Follow up with your healthcare provider as directed: You may be referred to an ear, nose, and throat specialist  Write down your questions so you remember to ask them during your visits  © 2017 [facility] Information is for End User's use only and may not be sold, redistributed or otherwise used for commercial purposes  All illustrations and images included in CareNotes® are the copyrighted property of A D [employer] M [employer]  or Milton Sandoval  The above information is an  only  It is not intended as medical advice for individual conditions or treatments  Talk to your doctor, nurse or pharmacist before following any medical regimen to see if it is safe and effective for you  Eczema   AMBULATORY CARE:   Eczema , or atopic dermatitis, is an itchy, red skin rash  You are more likely to have it if your parent or a family member has eczema, asthma, or hay fever  It is a long-term condition that may cause flare-ups for the rest of your life    Common symptoms include the following:   · Patches of dry, red, itchy skin    · Bumps or blisters that crust over or ooze clear fluid    · Areas of skin that are thick, scaly, or hard and leather-like  Seek immediate care for the following symptoms:   · A fever or red streaks going up your arm or leg    · Increased swelling, redness, or warmth on your rash  Contact your healthcare provider if:   · Most of your skin is red, swollen, painful, and covered with scales  · You develop bloody, red, painful crusts  · Your skin blisters and oozes white or yellow pus  · You have questions about your condition or care  Treatment for eczema  is aimed at reducing pain and itching, and adding moisture to your skin  Your symptoms should improve after 3 weeks of treatment  There is no cure for eczema  You may need the following:  · Medicines , such as immunosuppressants, help reduce itching, redness, pain, and swelling  They may be given as a cream or pill  You may also receive antihistamines to reduce itching, or antibiotics if you have a skin infection  · Phototherapy , or ultraviolet light, may help heal your skin  It is also called light therapy  Manage eczema:   · Do not scratch  Pat or press on your skin to relieve itching  Your symptoms will get worse if you scratch  Keep your fingernails short so you do not tear your skin if you do scratch  · Keep your skin moist   Rub lotion, cream, or ointment into your skin right after a bath or shower when your skin is still damp  Ask your healthcare provider what to use and how often to use it  · Take baths or showers  with warm water for 10 minutes or less  Use mild bar soap  Ask your healthcare provider for the best soap for you to use  · Wear cotton clothes  Wear loose-fitting clothes made from cotton or cotton blends  Avoid wool  · Use a humidifier  to add moisture to the air in your home  · Avoid changes in temperature , especially activities that cause you to sweat a lot because this can cause itching   Remove blankets from your bed if you get hot while you sleep  · Avoid allergens, dust, and skin irritants  Do not let pets inside your home  Do not use perfume, fabric softener, or makeup that burns or itches  Follow up with your healthcare provider as directed:  Write down your questions so you remember to ask them during your visits  © 2017 [employer]  Information is for End User's use only and may not be sold, redistributed or otherwise used for commercial purposes  All illustrations and images included in CareNotes® are the copyrighted property of A D A M [employer]  or Milton Sandoval  The above information is an  only  It is not intended as medical advice for individual conditions or treatments  Talk to your doctor, nurse or pharmacist before following any medical regimen to see if it is safe and effective for you

## 2018-04-27 ENCOUNTER — TELEPHONE (OUTPATIENT)
Dept: FAMILY MEDICINE CLINIC | Facility: OTHER | Age: 48
End: 2018-04-27

## 2018-04-27 DIAGNOSIS — J30.2 SEASONAL ALLERGIC RHINITIS, UNSPECIFIED TRIGGER: Primary | ICD-10-CM

## 2018-04-27 RX ORDER — OLOPATADINE HYDROCHLORIDE 1 MG/ML
1 SOLUTION/ DROPS OPHTHALMIC 2 TIMES DAILY
Qty: 5 ML | Refills: 0 | Status: SHIPPED | OUTPATIENT
Start: 2018-04-27 | End: 2018-09-07

## 2018-04-27 NOTE — TELEPHONE ENCOUNTER
[facility] I would recommend she sees allergist first   I will make referral for her today  Thanks  I will recommend allergy eye drops and will fax it to her pharmacy  She needs to use it regularly with OTC antihistamine ( either Claritin, Zyrtec or allegra) daily

## 2018-09-07 ENCOUNTER — OFFICE VISIT (OUTPATIENT)
Dept: OBGYN CLINIC | Facility: CLINIC | Age: 48
End: 2018-09-07
Payer: COMMERCIAL

## 2018-09-07 ENCOUNTER — APPOINTMENT (OUTPATIENT)
Dept: RADIOLOGY | Facility: CLINIC | Age: 48
End: 2018-09-07
Payer: COMMERCIAL

## 2018-09-07 VITALS
SYSTOLIC BLOOD PRESSURE: 139 MMHG | BODY MASS INDEX: 21.76 KG/M2 | HEART RATE: 82 BPM | DIASTOLIC BLOOD PRESSURE: 95 MMHG | WEIGHT: 152 LBS | HEIGHT: 70 IN

## 2018-09-07 DIAGNOSIS — M25.522 PAIN IN LEFT ELBOW: ICD-10-CM

## 2018-09-07 DIAGNOSIS — S53.442A SPRAIN OF ULNAR COLLATERAL LIGAMENT OF LEFT ELBOW, INITIAL ENCOUNTER: Primary | ICD-10-CM

## 2018-09-07 PROCEDURE — 73080 X-RAY EXAM OF ELBOW: CPT

## 2018-09-07 PROCEDURE — 99203 OFFICE O/P NEW LOW 30 MIN: CPT | Performed by: ORTHOPAEDIC SURGERY

## 2018-09-07 RX ORDER — LORATADINE 10 MG/1
10 TABLET ORAL
COMMUNITY
End: 2020-09-14

## 2018-09-07 NOTE — LETTER
September 7, 2018     Patient: Luciano Bonilla   YOB: 1970   Date of Visit: 9/7/2018       To Whom it May Concern:    Samantha Clemens is under my professional care  She was seen in my office on 9/7/2018  She may return to work with limitations from 9/7/2018 until 10/7/2018: Maximum lifting 10 pounds occasionally  She may return to work full duty after 10/7/2018  If you have any questions or concerns, please don't hesitate to call           Sincerely,          Cullen Randhawa MD        CC: No Recipients

## 2018-09-07 NOTE — PROGRESS NOTES
Patient Name:  aSge Brown  MRN:  [id number]    Assessment & Plan    Left elbow UCL sprain 9/1/18  1  Activity modification, ice/heat, and OTC meds as needed  2  Continue elbow sleeve as needed  3  Anticipate gradual resolution over time  4  Follow-up as needed if pain persists  Chief Complaint    Left elbow pain      History of the Present Illness    80-year-old female presents with left elbow pain after a fall at home on 9/1/18 at one in the morning  She is uncertain of the exact mechanism but was caring a plastic container that broke when she fell  She has had pain in the posterior medial elbow since that time  No associated swelling, numbness, or tingling  Pain is worse with certain movements  She wears an elbow sleeve with limited improvement  Physical Exam    /95   Pulse 82   Ht 5' 10" (1 778 m)   Wt 68 9 kg (152 lb)   BMI 21 81 kg/m²     Left elbow:  Tenderness to palpation ulnar collateral ligament insertion and distal triceps tendon  Full range of motion  Stable with varus and valgus stress  Moving valgus stress test is negative  Tinel's sign is negative at the cubital tunnel  Constitutional:  Well-developed and well-nourished  Eyes:  Anicteric sclerae  Neck:  Supple  Lungs:  Unlabored breathing  Cardiovascular:  Capillary refill is less than 2 seconds  Skin:  Intact without erythema  Neurologic:  Sensation intact to light touch  Psychiatric:  Mood and affect are appropriate  Data Review    I have personally reviewed pertinent films in PACS, and my interpretation follows  X-rays left elbow 9/7/18:  No acute bony abnormalities        Past Medical History:   Diagnosis Date    Anxiety     Disease of thyroid gland     GERD (gastroesophageal reflux disease)     Headache     Hyperlipidemia        Past Surgical History:   Procedure Laterality Date    DILATION AND CURETTAGE OF UTERUS      EAR SURGERY      age 6   Dwight GRIFFIN ESOPHAGOGASTRODUODENOSCOPY TRANSORAL DIAGNOSTIC N/A 12/27/2017    Procedure: ESOPHAGOGASTRODUODENOSCOPY (EGD); Surgeon: Josesito Chapman MD;  [facility]; Service: Gastroenterology    VEIN LIGATION AND STRIPPING Bilateral     WISDOM TOOTH EXTRACTION         Allergies   Allergen Reactions    Biaxin [Clarithromycin] Diarrhea    Cefzil [Cefprozil] Dizziness    Flagyl [Metronidazole] Nausea Only    Percocet [Oxycodone-Acetaminophen] Nausea Only    Latex Rash       Current Outpatient Prescriptions on File Prior to Visit   Medication Sig Dispense Refill    levothyroxine 75 mcg tablet Take 75 mcg by mouth daily      ranitidine (ZANTAC) 150 mg tablet Take 150 mg by mouth daily        sertraline (ZOLOFT) 100 mg tablet Take 100 mg by mouth daily at bedtime        SYNTHROID 75 MCG tablet Take 75 mcg by mouth daily      [DISCONTINUED] ciprofloxacin-dexamethasone (CIPRODEX) otic suspension Administer 4 drops to the right ear 2 (two) times a day for 7 days 7 5 mL 0    [DISCONTINUED] olopatadine (PATANOL) 0 1 % ophthalmic solution Administer 1 drop to both eyes 2 (two) times a day 5 mL 0    [DISCONTINUED] triamcinolone (KENALOG) 0 1 % ointment Apply topically 2 (two) times a day for 14 days 30 g 0     No current facility-administered medications on file prior to visit  Social History   Substance Use Topics    Smoking status: Never Smoker    Smokeless tobacco: Never Used    Alcohol use No       Family History   Problem Relation Age of Onset    Hypertension Mother     Hypertension Father          Review of Systems    General:  Negative for fever, lethargy/malaise, or night sweats  Eyes:  Negative for blurry vision or double vision  ENT:  Negative for hearing change, nasal discharge, or sore throat  Hematological:  Negative for bleeding problems or blood clots  Endocrine:  Negative for excessive thirst or temperature intolerance  Respiratory:  Negative for cough or wheezing    Cardiovascular:  Negative for chest pain, dyspnea on exertion, or palpitations  Gastrointestinal:  Negative for abdominal pain, diarrhea, or nausea/vomiting  Musculoskeletal:  As stated in the HPI and otherwise negative  Neurological:  Negative for confusion, headaches, or seizures  Psychological:  Negative for hallucinations or mood swings  Dermatological:  Negative for itching or rash

## 2018-09-24 ENCOUNTER — TELEPHONE (OUTPATIENT)
Dept: OBGYN CLINIC | Facility: CLINIC | Age: 48
End: 2018-09-24

## 2018-09-24 NOTE — TELEPHONE ENCOUNTER
Call from patient   Call back # [phone]  Beatrice Ferrer     Patient would like to know if she should continue wearing the elbow sleeve? Patient states she is still having a lot of pain

## 2018-09-25 NOTE — TELEPHONE ENCOUNTER
Called patient and let her know that she should continue with the brace and that if in 4-6 weeks she continues to have pain, she can call us to schedule an apt to see Regulo Rodriguez

## 2018-09-25 NOTE — TELEPHONE ENCOUNTER
Yes I would continue the sleeve   Recommend follow up if pain persists after 4-6 weeks of conservative mgmt

## 2018-10-23 ENCOUNTER — OFFICE VISIT (OUTPATIENT)
Dept: FAMILY MEDICINE CLINIC | Facility: OTHER | Age: 48
End: 2018-10-23
Payer: COMMERCIAL

## 2018-10-23 VITALS
WEIGHT: 154.56 LBS | BODY MASS INDEX: 22.13 KG/M2 | HEART RATE: 96 BPM | DIASTOLIC BLOOD PRESSURE: 88 MMHG | HEIGHT: 70 IN | TEMPERATURE: 97.9 F | SYSTOLIC BLOOD PRESSURE: 120 MMHG | OXYGEN SATURATION: 99 %

## 2018-10-23 DIAGNOSIS — G43.109 MIGRAINE WITH AURA AND WITHOUT STATUS MIGRAINOSUS, NOT INTRACTABLE: ICD-10-CM

## 2018-10-23 DIAGNOSIS — Z23 NEED FOR INFLUENZA VACCINATION: ICD-10-CM

## 2018-10-23 DIAGNOSIS — Z12.39 BREAST CANCER SCREENING: ICD-10-CM

## 2018-10-23 DIAGNOSIS — F41.1 GENERALIZED ANXIETY DISORDER: Primary | ICD-10-CM

## 2018-10-23 PROCEDURE — 99214 OFFICE O/P EST MOD 30 MIN: CPT | Performed by: FAMILY MEDICINE

## 2018-10-23 PROCEDURE — 90686 IIV4 VACC NO PRSV 0.5 ML IM: CPT

## 2018-10-23 PROCEDURE — 90471 IMMUNIZATION ADMIN: CPT

## 2018-10-23 RX ORDER — RIZATRIPTAN BENZOATE 10 MG/1
10 TABLET ORAL ONCE AS NEEDED
Qty: 9 TABLET | Refills: 0 | Status: SHIPPED | OUTPATIENT
Start: 2018-10-23

## 2018-10-23 RX ORDER — SERTRALINE HYDROCHLORIDE 100 MG/1
150 TABLET, FILM COATED ORAL
Qty: 45 TABLET | Refills: 0 | Status: SHIPPED | OUTPATIENT
Start: 2018-10-23 | End: 2018-11-27 | Stop reason: DRUGHIGH

## 2018-11-01 ENCOUNTER — OFFICE VISIT (OUTPATIENT)
Dept: FAMILY MEDICINE CLINIC | Facility: OTHER | Age: 48
End: 2018-11-01
Payer: COMMERCIAL

## 2018-11-01 VITALS
HEART RATE: 84 BPM | WEIGHT: 154.4 LBS | TEMPERATURE: 97.1 F | DIASTOLIC BLOOD PRESSURE: 78 MMHG | OXYGEN SATURATION: 98 % | BODY MASS INDEX: 22.15 KG/M2 | SYSTOLIC BLOOD PRESSURE: 144 MMHG

## 2018-11-01 DIAGNOSIS — J01.00 ACUTE NON-RECURRENT MAXILLARY SINUSITIS: Primary | ICD-10-CM

## 2018-11-01 PROCEDURE — 99214 OFFICE O/P EST MOD 30 MIN: CPT | Performed by: FAMILY MEDICINE

## 2018-11-01 RX ORDER — AMOXICILLIN AND CLAVULANATE POTASSIUM 875; 125 MG/1; MG/1
1 TABLET, FILM COATED ORAL EVERY 12 HOURS SCHEDULED
Qty: 20 TABLET | Refills: 0 | Status: SHIPPED | OUTPATIENT
Start: 2018-11-01 | End: 2018-11-11

## 2018-11-01 NOTE — PROGRESS NOTES
Subjective:      Patient ID: Robert Carrasco is a 50 y o  female  Sinus Problem   This is a new problem  The current episode started in the past 7 days  The problem has been gradually worsening since onset  There has been no fever  The pain is moderate  Associated symptoms include congestion, coughing (nonproductive), headaches, sinus pressure, sneezing and a sore throat  Pertinent negatives include no chills, diaphoresis, ear pain, hoarse voice, neck pain, shortness of breath or swollen glands  Past treatments include nothing  Cough   This is a new problem  The current episode started in the past 7 days  The problem has been waxing and waning  The problem occurs constantly  The cough is non-productive  Associated symptoms include ear congestion, headaches, nasal congestion, postnasal drip and a sore throat  Pertinent negatives include no chest pain, chills, ear pain, eye redness, fever, heartburn, hemoptysis, myalgias, rash, rhinorrhea, shortness of breath, sweats, weight loss or wheezing  Nothing aggravates the symptoms  She has tried nothing for the symptoms  The treatment provided no relief         The following portions of the patient's history were reviewed and updated as appropriate: allergies, current medications, past family history, past medical history, past social history, past surgical history and problem list       Current Outpatient Prescriptions:     loratadine (CLARITIN) 10 mg tablet, Take 10 mg by mouth daily, Disp: , Rfl:     ranitidine (ZANTAC) 150 mg tablet, Take 150 mg by mouth daily  , Disp: , Rfl:     rizatriptan (MAXALT) 10 MG tablet, Take 1 tablet (10 mg total) by mouth once as needed for migraine for up to 1 dose May repeat in 2 hours if needed, Disp: 9 tablet, Rfl: 0    sertraline (ZOLOFT) 100 mg tablet, Take 1 5 tablets (150 mg total) by mouth daily at bedtime, Disp: 45 tablet, Rfl: 0    SYNTHROID 75 MCG tablet, Take 75 mcg by mouth daily, Disp: , Rfl:    amoxicillin-clavulanate (AUGMENTIN) 875-125 mg per tablet, Take 1 tablet by mouth every 12 (twelve) hours for 10 days Take with food, Disp: 20 tablet, Rfl: 0      Review of Systems   Constitutional: Positive for fatigue  Negative for appetite change, chills, diaphoresis, fever and weight loss  HENT: Positive for congestion, postnasal drip, sinus pain, sinus pressure, sneezing and sore throat  Negative for drooling, ear pain, hearing loss, hoarse voice, rhinorrhea and tinnitus  Eyes: Negative for pain, discharge, redness and itching  Respiratory: Positive for cough (nonproductive)  Negative for hemoptysis, chest tightness, shortness of breath and wheezing  Cardiovascular: Negative for chest pain, palpitations and leg swelling  Gastrointestinal: Negative for abdominal pain, diarrhea, heartburn, nausea and vomiting  Genitourinary: Negative for dysuria and flank pain  Musculoskeletal: Negative for arthralgias, myalgias and neck pain  Skin: Negative for rash  Neurological: Positive for headaches  Negative for dizziness  Hematological: Negative for adenopathy  Psychiatric/Behavioral: Negative for confusion and dysphoric mood  The patient is not nervous/anxious  Objective:      /78 (BP Location: Left arm, Patient Position: Sitting, Cuff Size: Standard)   Pulse 84   Temp (!) 97 1 °F (36 2 °C) (Tympanic)   Wt 70 kg (154 lb 6 4 oz)   SpO2 98%   BMI 22 15 kg/m²          Physical Exam   Constitutional: She is oriented to person, place, and time  She appears well-developed and well-nourished  No distress  HENT:   Head: Normocephalic and atraumatic  Right Ear: Tympanic membrane is retracted  Left Ear: Tympanic membrane is retracted  Nose: Mucosal edema present  Right sinus exhibits maxillary sinus tenderness  Right sinus exhibits no frontal sinus tenderness  Left sinus exhibits maxillary sinus tenderness  Left sinus exhibits no frontal sinus tenderness     Mouth/Throat: Uvula is midline  Oropharyngeal exudate and posterior oropharyngeal erythema present  Eyes: Pupils are equal, round, and reactive to light  Conjunctivae and EOM are normal  Right eye exhibits no discharge  Left eye exhibits no discharge  No scleral icterus  Neck: Normal range of motion  Neck supple  No thyromegaly present  Cardiovascular: Normal rate and regular rhythm  No murmur heard  Pulmonary/Chest: Effort normal and breath sounds normal  No respiratory distress  She has no wheezes  Abdominal: Soft  Bowel sounds are normal  She exhibits no distension  There is no tenderness  Musculoskeletal: Normal range of motion  She exhibits no edema, tenderness or deformity  Lymphadenopathy:     She has no cervical adenopathy  Neurological: She is alert and oriented to person, place, and time  She has normal reflexes  No cranial nerve deficit  Coordination normal    Skin: Skin is warm and dry  No rash noted  No erythema  Psychiatric: She has a normal mood and affect  Her behavior is normal            Assessment/Plan:  Diagnoses and all orders for this visit:    Acute non-recurrent maxillary sinusitis  -     amoxicillin-clavulanate (AUGMENTIN) 875-125 mg per tablet; Take 1 tablet by mouth every 12 (twelve) hours for 10 days Take with food            55-year-old female presents with acute non recurrent maxillary sinusitis  Will empirically treat with 10 day course of oral Augmentin  Recommend supportive care with saltwater gargles, saline nasal spray, oral antihistamines, and nasal steroid sprays (available OTC)  If symptoms not improving in 2-3 days, patient is contact the office for further advice  RTO as needed -- keep 11/27 appt to review zoloft dosing  The patient indicates understanding of these issues and agrees with the plan        Pati Masters DO

## 2018-11-27 ENCOUNTER — OFFICE VISIT (OUTPATIENT)
Dept: FAMILY MEDICINE CLINIC | Facility: OTHER | Age: 48
End: 2018-11-27
Payer: COMMERCIAL

## 2018-11-27 VITALS
BODY MASS INDEX: 22.39 KG/M2 | TEMPERATURE: 97.6 F | OXYGEN SATURATION: 98 % | HEART RATE: 83 BPM | SYSTOLIC BLOOD PRESSURE: 122 MMHG | DIASTOLIC BLOOD PRESSURE: 80 MMHG | HEIGHT: 70 IN | WEIGHT: 156.38 LBS

## 2018-11-27 DIAGNOSIS — F41.1 GENERALIZED ANXIETY DISORDER: Primary | ICD-10-CM

## 2018-11-27 PROBLEM — H60.60 CHRONIC OTITIS EXTERNA: Status: RESOLVED | Noted: 2017-12-18 | Resolved: 2018-11-27

## 2018-11-27 PROBLEM — J20.9 ACUTE BRONCHITIS: Status: RESOLVED | Noted: 2018-03-06 | Resolved: 2018-11-27

## 2018-11-27 PROBLEM — J01.40 ACUTE NON-RECURRENT PANSINUSITIS: Status: RESOLVED | Noted: 2017-12-26 | Resolved: 2018-11-27

## 2018-11-27 PROBLEM — H10.10 ALLERGIC CONJUNCTIVITIS: Status: RESOLVED | Noted: 2017-12-18 | Resolved: 2018-11-27

## 2018-11-27 PROCEDURE — 1036F TOBACCO NON-USER: CPT | Performed by: FAMILY MEDICINE

## 2018-11-27 PROCEDURE — 99213 OFFICE O/P EST LOW 20 MIN: CPT | Performed by: FAMILY MEDICINE

## 2018-11-27 PROCEDURE — 3008F BODY MASS INDEX DOCD: CPT | Performed by: FAMILY MEDICINE

## 2018-11-27 RX ORDER — LORAZEPAM 0.5 MG/1
0.5 TABLET ORAL DAILY PRN
Qty: 15 TABLET | Refills: 0 | Status: SHIPPED | OUTPATIENT
Start: 2018-11-27 | End: 2020-09-14 | Stop reason: SDUPTHER

## 2018-11-27 RX ORDER — FLUOXETINE HYDROCHLORIDE 40 MG/1
40 CAPSULE ORAL DAILY
Qty: 30 CAPSULE | Refills: 1 | Status: SHIPPED | OUTPATIENT
Start: 2018-11-27 | End: 2019-02-08 | Stop reason: SDUPTHER

## 2018-11-27 NOTE — PROGRESS NOTES
Subjective:      Patient ID: Estevan Garner is a 50 y o  female  Patient presents for anxiety follow-up  Has been on sertraline 150 mg daily for the last month  Does not see any significant improvement in her symptoms with this increased dose  Patient states that she has been on sertraline for a very long time and is willing to consider other options      Anxiety   Presents for follow-up visit  Symptoms include decreased concentration, excessive worry, insomnia, nervous/anxious behavior and restlessness  Patient reports no chest pain, compulsions, confusion, depressed mood, dizziness, dry mouth, feeling of choking, hyperventilation, impotence, irritability, malaise, muscle tension, nausea, obsessions, palpitations, panic, shortness of breath or suicidal ideas  Symptoms occur most days  The severity of symptoms is moderate  The quality of sleep is non-restorative  Nighttime awakenings: occasional      Compliance with medications is %         The following portions of the patient's history were reviewed and updated as appropriate: allergies, current medications, past family history, past medical history, past social history, past surgical history and problem list       Current Outpatient Prescriptions:     loratadine (CLARITIN) 10 mg tablet, Take 10 mg by mouth daily, Disp: , Rfl:     ranitidine (ZANTAC) 150 mg tablet, Take 150 mg by mouth daily  , Disp: , Rfl:     rizatriptan (MAXALT) 10 MG tablet, Take 1 tablet (10 mg total) by mouth once as needed for migraine for up to 1 dose May repeat in 2 hours if needed, Disp: 9 tablet, Rfl: 0    SYNTHROID 75 MCG tablet, Take 75 mcg by mouth daily, Disp: , Rfl:     FLUoxetine (PROzac) 40 MG capsule, Take 1 capsule (40 mg total) by mouth daily, Disp: 30 capsule, Rfl: 1    LORazepam (ATIVAN) 0 5 mg tablet, Take 1 tablet (0 5 mg total) by mouth daily as needed for anxiety, Disp: 15 tablet, Rfl: 0      Review of Systems   Constitutional: Negative for activity change, fatigue, fever and irritability  HENT: Negative for congestion, ear pain, sinus pain and sore throat  Eyes: Negative for pain and itching  Respiratory: Negative for cough and shortness of breath  Cardiovascular: Negative for chest pain and palpitations  Gastrointestinal: Negative for abdominal pain, constipation, diarrhea, nausea and vomiting  Endocrine: Negative for cold intolerance and heat intolerance  Genitourinary: Negative for difficulty urinating, dysuria, frequency, impotence and urgency  Musculoskeletal: Negative for myalgias  Skin: Negative for color change and rash  Neurological: Negative for dizziness, syncope and headaches  Hematological: Negative for adenopathy  Psychiatric/Behavioral: Positive for decreased concentration  Negative for behavioral problems, confusion, dysphoric mood, sleep disturbance and suicidal ideas  The patient is nervous/anxious and has insomnia  Objective:      /80 (BP Location: Right arm, Patient Position: Sitting, Cuff Size: Adult)   Pulse 83   Temp 97 6 °F (36 4 °C) (Tympanic)   Ht 5' 10" (1 778 m)   Wt 70 9 kg (156 lb 6 oz)   SpO2 98%   BMI 22 44 kg/m²          Physical Exam   Constitutional: She is oriented to person, place, and time  She appears well-developed and well-nourished  No distress  HENT:   Head: Normocephalic and atraumatic  Right Ear: External ear normal    Left Ear: External ear normal    Nose: Nose normal    Mouth/Throat: Oropharynx is clear and moist    Eyes: Pupils are equal, round, and reactive to light  Conjunctivae and EOM are normal  No scleral icterus  Neck: Normal range of motion  Neck supple  No thyromegaly present  Cardiovascular: Normal rate, regular rhythm and normal heart sounds  No murmur heard  Pulmonary/Chest: Effort normal and breath sounds normal  No respiratory distress  She has no wheezes  Abdominal: Soft  Bowel sounds are normal  She exhibits no distension   There is no tenderness  Musculoskeletal: Normal range of motion  She exhibits no edema, tenderness or deformity  Lymphadenopathy:     She has no cervical adenopathy  Neurological: She is alert and oriented to person, place, and time  No cranial nerve deficit  Coordination normal    Skin: Skin is warm and dry  No rash noted  No erythema  Psychiatric: She has a normal mood and affect  Her behavior is normal    Vitals reviewed  Assessment/Plan:   Diagnoses and all orders for this visit:    Generalized anxiety disorder  -     FLUoxetine (PROzac) 40 MG capsule; Take 1 capsule (40 mg total) by mouth daily  -     LORazepam (ATIVAN) 0 5 mg tablet; Take 1 tablet (0 5 mg total) by mouth daily as needed for anxiety        59-year-old female with history of generalized anxiety disorder presents for follow-up  Not responding to maximum dose of sertraline, therefore will switch her to a different SSRI  Will start fluoxetine 40 mg daily and have patient follow up in about 1 month to review/reassess  Previously, patient states that she had been on Paxil but did not like it because of side effects    Trial of p r n  lorazepam offered today  Return in about 4 weeks (around 12/25/2018) for Recheck meds/mood  The patient indicates understanding of these issues and agrees with the plan          Maria Teresa Us DO

## 2018-11-30 ENCOUNTER — TELEPHONE (OUTPATIENT)
Dept: FAMILY MEDICINE CLINIC | Facility: OTHER | Age: 48
End: 2018-11-30

## 2018-11-30 NOTE — TELEPHONE ENCOUNTER
Patient was seen recently but forgot to mention that its been a while since she checked her cholesterol levels   Would like to go for blood work

## 2018-12-03 DIAGNOSIS — E78.2 MIXED HYPERLIPIDEMIA: Primary | ICD-10-CM

## 2018-12-08 ENCOUNTER — OFFICE VISIT (OUTPATIENT)
Dept: URGENT CARE | Age: 48
End: 2018-12-08
Payer: COMMERCIAL

## 2018-12-08 ENCOUNTER — TELEPHONE (OUTPATIENT)
Dept: OTHER | Facility: OTHER | Age: 48
End: 2018-12-08

## 2018-12-08 VITALS
SYSTOLIC BLOOD PRESSURE: 127 MMHG | HEART RATE: 85 BPM | OXYGEN SATURATION: 99 % | RESPIRATION RATE: 16 BRPM | BODY MASS INDEX: 21.62 KG/M2 | DIASTOLIC BLOOD PRESSURE: 78 MMHG | WEIGHT: 151 LBS | HEIGHT: 70 IN | TEMPERATURE: 98.4 F

## 2018-12-08 DIAGNOSIS — N30.01 ACUTE CYSTITIS WITH HEMATURIA: Primary | ICD-10-CM

## 2018-12-08 PROBLEM — N30.90 CYSTITIS: Status: ACTIVE | Noted: 2018-12-08

## 2018-12-08 LAB
SL AMB  POCT GLUCOSE, UA: ABNORMAL
SL AMB LEUKOCYTE ESTERASE,UA: ABNORMAL
SL AMB POCT BILIRUBIN,UA: ABNORMAL
SL AMB POCT BLOOD,UA: ABNORMAL
SL AMB POCT CLARITY,UA: ABNORMAL
SL AMB POCT COLOR,UA: ABNORMAL
SL AMB POCT KETONES,UA: ABNORMAL
SL AMB POCT NITRITE,UA: ABNORMAL
SL AMB POCT PH,UA: 6
SL AMB POCT SPECIFIC GRAVITY,UA: 1.02
SL AMB POCT URINE PROTEIN: ABNORMAL
SL AMB POCT UROBILINOGEN: 0.2

## 2018-12-08 PROCEDURE — G0382 LEV 3 HOSP TYPE B ED VISIT: HCPCS | Performed by: FAMILY MEDICINE

## 2018-12-08 PROCEDURE — 81002 URINALYSIS NONAUTO W/O SCOPE: CPT | Performed by: FAMILY MEDICINE

## 2018-12-08 PROCEDURE — 87086 URINE CULTURE/COLONY COUNT: CPT | Performed by: PHYSICIAN ASSISTANT

## 2018-12-08 RX ORDER — SULFAMETHOXAZOLE AND TRIMETHOPRIM 800; 160 MG/1; MG/1
1 TABLET ORAL EVERY 12 HOURS SCHEDULED
Qty: 6 TABLET | Refills: 0 | Status: SHIPPED | OUTPATIENT
Start: 2018-12-08 | End: 2018-12-11

## 2018-12-08 NOTE — TELEPHONE ENCOUNTER
Breanna Martin 1970  CONFIDENTIALTY NOTICE: This fax transmission is intended only for the addressee  It contains information that is legally privileged,  confidential or otherwise protected from use or disclosure  If you are not the intended recipient, you are strictly prohibited from reviewing,  disclosing, copying using or disseminating any of this information or taking any action in reliance on or regarding this information  If you have  received this fax in error, please notify us immediately by telephone so that we can arrange for its return to us  Page: 1 of 3  Call Id: [id number]  Health Call  Standard Call Report  Health Call  Patient Name: Breanna Martin  Gender: Female  : 1970  Age: 50 Y 3 M 8 D  Return Phone  Number: [phone] (Home)  Address:  City/State/Zip:  Practice Name: [address]  Practice Charged:  Physician:  [facility] Name:  Relationship To  Patient:  Return Phone Number: [phone] (Home)  Presenting Problem: "I think I have a bladder infection "  Service Type: Triage  Charged Service 1: N/A  Pharmacy Name and  Number:  Nurse Assessment  Nurse: Lakisha Weldon RN Date/Time: 2018 11:30:44 AM  Type of assessment required:  ---General (Adult or Child)  Duration of Current S/S  ---Started on Wednesday  Location/Radiation  ---  Temperature (F) and route:  ---98 1 forehead  Symptom Specific Meds (Dose/Time):  ---None  Other S/S  ---Had left side flank pain on Wednesday, which has now subsided  Last night started  with urine frequency and urgency  Pain Scale on scale of 1-10, 10 being the worst:  ---710  Symptom progression:  ---same  Intake and Output  ---WNL  LMP/ Pregnancy:  Breanna Martin 1970  CONFIDENTIALTY NOTICE: This fax transmission is intended only for the addressee  It contains information that is legally privileged,  confidential or otherwise protected from use or disclosure   If you are not the intended recipient, you are strictly prohibited from reviewing,  disclosing, copying using or disseminating any of this information or taking any action in reliance on or regarding this information  If you have  received this fax in error, please notify us immediately by telephone so that we can arrange for its return to us  Page: 2 of 3  Call Id: [id number]  Nurse Assessment  ---N/A  Breastfeeding  ---No  Last Exam/Treatment:  ---Two weeks ago / well visit  Protocols  Protocol Title Nurse Date/Time  Urinary Symptoms Suzie Swanson RN, [facility] 12/8/2018 11:36:36 AM  Question Caller Affirmed  Disp  Time Disposition Final User  12/8/2018 11:43:01 AM See Physician within 25 Hours Lisa Hernandes  12/8/2018 11:43:52 AM RN Triaged Yes Larissa Archuleta RN, [facility] Advice Given Per Protocol  SEE PHYSICIAN WITHIN 24 HOURS: * IF OFFICE WILL BE OPEN: You need to be seen within the next 24 hours  Call your doctor  when the office opens, and make an appointment  * IF OFFICE WILL BE CLOSED AND NO PCP TRIAGE: You need to be seen within  the next 24 hours  An urgent care center is often a good source of care if your doctor's office is closed  PAIN MEDICINES: * For pain  relief, take acetaminophen, ibuprofen, or naproxen  * Use the lowest amount that makes your pain feel better  ACETAMINOPHEN (E G ,  TYLENOL): * Take 650 mg (two 325 mg pills) by mouth every 4-6 hours as needed  Each Regular Strength Tylenol pill has 325 mg  of acetaminophen  The most you should take each day is 3,250 mg (10 Regular Strength pills a day)  * Another choice is to take 1,000  mg (two 500 mg pills) every 8 hours as needed  Each Extra Strength Tylenol pill has 500 mg of acetaminophen  The most you should  take each day is 3,000 mg (6 Extra Strength pills a day)  IBUPROFEN (E G , MOTRIN, ADVIL): * Take 400 mg (two 200 mg pills)  by mouth every 6 hours as needed  * Another choice is to take 600 mg (three 200 mg pills) by mouth every 8 hours as needed   * The  most you should take each day is 1,200 mg (six 200 mg pills a day), unless your doctor has told you to take more  EXTRA NOTES:  * Acetaminophen is thought to be safer than ibuprofen or naproxen for people over 72years old  Acetaminophen is in many OTC and  prescription medicines  It might be in more than one medicine that you are taking  You need to be careful and not take an overdose  An  acetaminophen overdose can hurt the liver  [employer], the company that makes Tylenol, has different dosage instructions for Tylenol in  [place] and the [facility]  In [place] the maximum recommended dose per day is 4,000 mg or twelve (12) Regular-Strength (325  mg) pills  In the [place], [employer] recommends a maximum dose of ten (10) Regular-Strength (325 mg) pills  * Before taking any  medicine, read all the instructions on the package  CAUTION - NSAIDS (E G , IBUPROFEN, NAPROXEN): * Do not take nonsteroidal  anti-inflammatory drugs (NSAIDs) if you have stomach problems, kidney disease, heart failure, or other contraindications to using this  type of medication  * Do not take NSAID medications for over 7 days without consulting your PCP  * Do not take NSAID medications  if you are pregnant  * You may take this medicine with or without food  Taking it with food or milk may lessen the chance the drug  will upset your stomach  * GASTROINTESTINAL RISK: There is an increased risk of stomach ulcers, GI bleeding, perforation  *  CARDIOVASCULAR RISK: There may be an increased risk of heart attack and stroke  CALL BACK IF: * Fever occurs * Unable to  urinate and bladder feels full * You become worse  CARE ADVICE given per Urinary Symptoms (Adult) guideline  Caller Understands: Yes  Caller Disagree/Comply: Comply  PreDisposition: Olman Perez 1970  CONFIDENTIALTY NOTICE: This fax transmission is intended only for the addressee  It contains information that is legally privileged,  confidential or otherwise protected from use or disclosure   If you are not the intended recipient, you are strictly prohibited from reviewing,  disclosing, copying using or disseminating any of this information or taking any action in reliance on or regarding this information  If you have  received this fax in error, please notify us immediately by telephone so that we can arrange for its return to us  Page: 3 of 3  Call Id: [id number]  Comments  User: Kiara Alston RN Date/Time: 12/8/2018 11:48:08 AM  Per protocol patient advised to go to [facility]  She does not want to go because she said they charged her too much in the past   Advised to call back if she becomes worst, which she agreed with  User: Kiara Alston RN Date/Time: 12/8/2018 12:14:45 PM  Patient will call office on Monday morning to schedule an appointment

## 2018-12-08 NOTE — PROGRESS NOTES
[facility]        NAME: Daisy Moore is a 50 y o  female  : 1970    MRN: [id number]  DATE: 2018  TIME: 3:36 PM    Assessment and Plan   Acute cystitis with hematuria [N30 01]  1  Acute cystitis with hematuria  Urine culture    POCT urine dip    sulfamethoxazole-trimethoprim (BACTRIM DS) 800-160 mg per tablet         Patient Instructions       Follow up with PCP in 3-5 days  Proceed to  ER if symptoms worsen  Chief Complaint     Chief Complaint   Patient presents with    Possible UTI     pt c/o symtoms of bad lower back pain, stomach cramps, nausea, urinary frequency and slight burning when not urinating that started two days ago  History of Present Illness       55-year-old female presents complaining of dysuria, suprapubic pressure, urinary frequency and urgency, low back pain for 3 days  Reports symptoms are worsening  Had some queasiness yesterday but this has resolved  No fever, chills, flank pain, vomiting, diarrhea  Has had UTIs in the past   Has tried cranberry juice and drinking lots of fluids without relief  Urine was positive for blood and leukocytes        Review of Systems   Review of Systems   Constitutional: Negative for chills and fever  Respiratory: Negative for cough and shortness of breath  Cardiovascular: Negative for chest pain  Gastrointestinal: Negative for abdominal pain, nausea and vomiting  Genitourinary: Positive for dysuria, frequency, pelvic pain and urgency  Negative for dyspareunia, flank pain, hematuria, vaginal discharge and vaginal pain  Musculoskeletal: Negative for back pain           Current Medications       Current Outpatient Prescriptions:     FLUoxetine (PROzac) 40 MG capsule, Take 1 capsule (40 mg total) by mouth daily, Disp: 30 capsule, Rfl: 1    loratadine (CLARITIN) 10 mg tablet, Take 10 mg by mouth daily, Disp: , Rfl:     LORazepam (ATIVAN) 0 5 mg tablet, Take 1 tablet (0 5 mg total) by mouth daily as needed for anxiety, Disp: 15 tablet, Rfl: 0    ranitidine (ZANTAC) 150 mg tablet, Take 150 mg by mouth daily  , Disp: , Rfl:     rizatriptan (MAXALT) 10 MG tablet, Take 1 tablet (10 mg total) by mouth once as needed for migraine for up to 1 dose May repeat in 2 hours if needed, Disp: 9 tablet, Rfl: 0    SYNTHROID 75 MCG tablet, Take 75 mcg by mouth daily, Disp: , Rfl:     sulfamethoxazole-trimethoprim (BACTRIM DS) 800-160 mg per tablet, Take 1 tablet by mouth every 12 (twelve) hours for 3 days, Disp: 6 tablet, Rfl: 0    Current Allergies     Allergies as of 12/08/2018 - Reviewed 12/08/2018   Allergen Reaction Noted    Biaxin [clarithromycin] Diarrhea 12/22/2017    Cefzil [cefprozil] Dizziness 12/22/2017    Flagyl [metronidazole] Nausea Only 12/22/2017    Percocet [oxycodone-acetaminophen] Nausea Only 12/22/2017    Latex Rash 12/22/2017            The following portions of the patient's history were reviewed and updated as appropriate: allergies, current medications, past family history, past medical history, past social history, past surgical history and problem list      Past Medical History:   Diagnosis Date    Anxiety     Chronic otitis externa     Last assessed 12/18/2017    Disease of thyroid gland     GERD (gastroesophageal reflux disease)     Headache     Hyperlipidemia        Past Surgical History:   Procedure Laterality Date    DILATION AND CURETTAGE OF UTERUS      EAR SURGERY      age 6    MO ESOPHAGOGASTRODUODENOSCOPY TRANSORAL DIAGNOSTIC N/A 12/27/2017    Procedure: ESOPHAGOGASTRODUODENOSCOPY (EGD); Surgeon: Gabe Armstrong MD;  [facility]; Service: Gastroenterology    TUBAL LIGATION      VEIN LIGATION AND STRIPPING Bilateral     WISDOM TOOTH EXTRACTION         Family History   Problem Relation Age of Onset    Hypertension Mother     Hypertension Father     Hyperlipidemia Father          Medications have been verified          Objective   /78 (BP Location: Left arm, Patient Position: Sitting, Cuff Size: Standard)   Pulse 85   Temp 98 4 °F (36 9 °C) (Oral)   Resp 16   Ht 5' 10" (1 778 m)   Wt 68 5 kg (151 lb)   SpO2 99%   Breastfeeding? No   BMI 21 67 kg/m²        Physical Exam     Physical Exam   Constitutional: She is oriented to person, place, and time  She appears well-developed and well-nourished  No distress  HENT:   Head: Normocephalic and atraumatic  Mouth/Throat: Oropharynx is clear and moist    Eyes: Pupils are equal, round, and reactive to light  Conjunctivae and EOM are normal  Right eye exhibits no discharge  Left eye exhibits no discharge  No scleral icterus  Neck: Normal range of motion  Neck supple  No thyromegaly present  Cardiovascular: Normal rate, regular rhythm and normal heart sounds  Exam reveals no gallop and no friction rub  No murmur heard  Pulmonary/Chest: Effort normal and breath sounds normal  No respiratory distress  She has no wheezes  She has no rales  Abdominal: Soft  She exhibits no distension and no mass  There is tenderness (suprapubic)  There is no rebound, no guarding and no CVA tenderness  Musculoskeletal: Normal range of motion  She exhibits no edema  Lymphadenopathy:     She has no cervical adenopathy  Neurological: She is alert and oriented to person, place, and time  No cranial nerve deficit  Skin: Skin is warm and dry  No rash noted  She is not diaphoretic  No erythema  No pallor

## 2018-12-10 LAB — BACTERIA UR CULT: ABNORMAL

## 2018-12-11 DIAGNOSIS — N30.01 ACUTE CYSTITIS WITH HEMATURIA: Primary | ICD-10-CM

## 2018-12-11 RX ORDER — CIPROFLOXACIN 250 MG/1
250 TABLET, FILM COATED ORAL EVERY 12 HOURS SCHEDULED
Qty: 6 TABLET | Refills: 0 | Status: SHIPPED | OUTPATIENT
Start: 2018-12-11 | End: 2018-12-15

## 2018-12-13 ENCOUNTER — TELEPHONE (OUTPATIENT)
Dept: FAMILY MEDICINE CLINIC | Facility: OTHER | Age: 48
End: 2018-12-13

## 2018-12-13 ENCOUNTER — TELEPHONE (OUTPATIENT)
Dept: UROLOGY | Facility: MEDICAL CENTER | Age: 48
End: 2018-12-13

## 2018-12-13 DIAGNOSIS — N30.01 ACUTE CYSTITIS WITH HEMATURIA: Primary | ICD-10-CM

## 2018-12-13 RX ORDER — NITROFURANTOIN 25; 75 MG/1; MG/1
100 CAPSULE ORAL 2 TIMES DAILY
Qty: 14 CAPSULE | Refills: 0 | Status: SHIPPED | OUTPATIENT
Start: 2018-12-13 | End: 2018-12-20

## 2018-12-13 NOTE — TELEPHONE ENCOUNTER
Complaint/Diagnosis:Recurring bladder infections    Insurance:[employer]    History of Cancer:no    Previous urologist:no    Outside testing/where:[facility]    If yes,what kind:[facility]    Records requested/where:[facility]    Preferred location:[place]

## 2018-12-13 NOTE — TELEPHONE ENCOUNTER
Recommend pt take a 7-day course of oral macrobid (sent to pharmacy)  If sx not improved, will need to see Urology    Thanks!   Kiana Cosby, DO

## 2018-12-13 NOTE — TELEPHONE ENCOUNTER
Patient called today letting dr Michael Fox know that she still has the pressure and is not getting any better and would like to know what to do next

## 2018-12-15 ENCOUNTER — APPOINTMENT (EMERGENCY)
Dept: CT IMAGING | Facility: HOSPITAL | Age: 48
End: 2018-12-15
Payer: COMMERCIAL

## 2018-12-15 ENCOUNTER — HOSPITAL ENCOUNTER (EMERGENCY)
Facility: HOSPITAL | Age: 48
Discharge: HOME | End: 2018-12-15
Attending: EMERGENCY MEDICINE | Admitting: EMERGENCY MEDICINE
Payer: COMMERCIAL

## 2018-12-15 VITALS
WEIGHT: 153.66 LBS | SYSTOLIC BLOOD PRESSURE: 178 MMHG | RESPIRATION RATE: 20 BRPM | TEMPERATURE: 97.4 F | HEART RATE: 77 BPM | BODY MASS INDEX: 22.05 KG/M2 | OXYGEN SATURATION: 99 % | DIASTOLIC BLOOD PRESSURE: 82 MMHG

## 2018-12-15 DIAGNOSIS — R10.2 SUPRAPUBIC ABDOMINAL PAIN: ICD-10-CM

## 2018-12-15 DIAGNOSIS — N20.0 KIDNEY STONE: Primary | ICD-10-CM

## 2018-12-15 LAB
ALBUMIN SERPL BCP-MCNC: 4 G/DL (ref 3.5–5)
ALP SERPL-CCNC: 107 U/L (ref 46–116)
ALT SERPL W P-5'-P-CCNC: 26 U/L (ref 12–78)
AMORPH PHOS CRY URNS QL MICRO: ABNORMAL /HPF
ANION GAP SERPL CALCULATED.3IONS-SCNC: 9 MMOL/L (ref 4–13)
AST SERPL W P-5'-P-CCNC: 16 U/L (ref 5–45)
BACTERIA UR QL AUTO: ABNORMAL /HPF
BASOPHILS # BLD AUTO: 0.05 THOUSANDS/ΜL (ref 0–0.1)
BASOPHILS NFR BLD AUTO: 1 % (ref 0–1)
BILIRUB SERPL-MCNC: 0.4 MG/DL (ref 0.2–1)
BILIRUB UR QL STRIP: NEGATIVE
BUN SERPL-MCNC: 17 MG/DL (ref 5–25)
CALCIUM SERPL-MCNC: 8.9 MG/DL (ref 8.3–10.1)
CHLORIDE SERPL-SCNC: 104 MMOL/L (ref 100–108)
CLARITY UR: ABNORMAL
CO2 SERPL-SCNC: 26 MMOL/L (ref 21–32)
COLOR UR: YELLOW
CREAT SERPL-MCNC: 0.87 MG/DL (ref 0.6–1.3)
EOSINOPHIL # BLD AUTO: 0.22 THOUSAND/ΜL (ref 0–0.61)
EOSINOPHIL NFR BLD AUTO: 5 % (ref 0–6)
ERYTHROCYTE [DISTWIDTH] IN BLOOD BY AUTOMATED COUNT: 12.7 % (ref 11.6–15.1)
EXT PREG TEST URINE: NEGATIVE
GFR SERPL CREATININE-BSD FRML MDRD: 79 ML/MIN/1.73SQ M
GLUCOSE SERPL-MCNC: 105 MG/DL (ref 65–140)
GLUCOSE UR STRIP-MCNC: NEGATIVE MG/DL
HCT VFR BLD AUTO: 36 % (ref 34.8–46.1)
HGB BLD-MCNC: 11.8 G/DL (ref 11.5–15.4)
HGB UR QL STRIP.AUTO: ABNORMAL
IMM GRANULOCYTES # BLD AUTO: 0 THOUSAND/UL (ref 0–0.2)
IMM GRANULOCYTES NFR BLD AUTO: 0 % (ref 0–2)
KETONES UR STRIP-MCNC: NEGATIVE MG/DL
LEUKOCYTE ESTERASE UR QL STRIP: NEGATIVE
LIPASE SERPL-CCNC: 406 U/L (ref 73–393)
LYMPHOCYTES # BLD AUTO: 1.44 THOUSANDS/ΜL (ref 0.6–4.47)
LYMPHOCYTES NFR BLD AUTO: 34 % (ref 14–44)
MCH RBC QN AUTO: 30.2 PG (ref 26.8–34.3)
MCHC RBC AUTO-ENTMCNC: 32.8 G/DL (ref 31.4–37.4)
MCV RBC AUTO: 92 FL (ref 82–98)
MONOCYTES # BLD AUTO: 0.4 THOUSAND/ΜL (ref 0.17–1.22)
MONOCYTES NFR BLD AUTO: 10 % (ref 4–12)
MUCOUS THREADS UR QL AUTO: ABNORMAL
NEUTROPHILS # BLD AUTO: 2.09 THOUSANDS/ΜL (ref 1.85–7.62)
NEUTS SEG NFR BLD AUTO: 50 % (ref 43–75)
NITRITE UR QL STRIP: NEGATIVE
NON-SQ EPI CELLS URNS QL MICRO: ABNORMAL /HPF
NRBC BLD AUTO-RTO: 0 /100 WBCS
PH UR STRIP.AUTO: 7 [PH] (ref 4.5–8)
PLATELET # BLD AUTO: 183 THOUSANDS/UL (ref 149–390)
PMV BLD AUTO: 10.4 FL (ref 8.9–12.7)
POTASSIUM SERPL-SCNC: 3.7 MMOL/L (ref 3.5–5.3)
PROT SERPL-MCNC: 7.7 G/DL (ref 6.4–8.2)
PROT UR STRIP-MCNC: NEGATIVE MG/DL
RBC # BLD AUTO: 3.91 MILLION/UL (ref 3.81–5.12)
RBC #/AREA URNS AUTO: ABNORMAL /HPF
SODIUM SERPL-SCNC: 139 MMOL/L (ref 136–145)
SP GR UR STRIP.AUTO: 1.01 (ref 1–1.03)
UROBILINOGEN UR QL STRIP.AUTO: 0.2 E.U./DL
WBC # BLD AUTO: 4.2 THOUSAND/UL (ref 4.31–10.16)
WBC #/AREA URNS AUTO: ABNORMAL /HPF

## 2018-12-15 PROCEDURE — 85025 COMPLETE CBC W/AUTO DIFF WBC: CPT | Performed by: EMERGENCY MEDICINE

## 2018-12-15 PROCEDURE — 81025 URINE PREGNANCY TEST: CPT | Performed by: EMERGENCY MEDICINE

## 2018-12-15 PROCEDURE — 99284 EMERGENCY DEPT VISIT MOD MDM: CPT

## 2018-12-15 PROCEDURE — 96361 HYDRATE IV INFUSION ADD-ON: CPT

## 2018-12-15 PROCEDURE — 74177 CT ABD & PELVIS W/CONTRAST: CPT

## 2018-12-15 PROCEDURE — 36415 COLL VENOUS BLD VENIPUNCTURE: CPT | Performed by: EMERGENCY MEDICINE

## 2018-12-15 PROCEDURE — 80053 COMPREHEN METABOLIC PANEL: CPT | Performed by: EMERGENCY MEDICINE

## 2018-12-15 PROCEDURE — 96374 THER/PROPH/DIAG INJ IV PUSH: CPT

## 2018-12-15 PROCEDURE — 83690 ASSAY OF LIPASE: CPT | Performed by: EMERGENCY MEDICINE

## 2018-12-15 PROCEDURE — 81001 URINALYSIS AUTO W/SCOPE: CPT | Performed by: EMERGENCY MEDICINE

## 2018-12-15 RX ORDER — KETOROLAC TROMETHAMINE 30 MG/ML
30 INJECTION, SOLUTION INTRAMUSCULAR; INTRAVENOUS ONCE
Status: COMPLETED | OUTPATIENT
Start: 2018-12-15 | End: 2018-12-15

## 2018-12-15 RX ORDER — HYDROCODONE BITARTRATE AND ACETAMINOPHEN 5; 325 MG/1; MG/1
1 TABLET ORAL ONCE
Status: COMPLETED | OUTPATIENT
Start: 2018-12-15 | End: 2018-12-15

## 2018-12-15 RX ORDER — HYDROCODONE BITARTRATE AND ACETAMINOPHEN 5; 325 MG/1; MG/1
1 TABLET ORAL EVERY 6 HOURS PRN
Qty: 12 TABLET | Refills: 0 | Status: SHIPPED | OUTPATIENT
Start: 2018-12-15 | End: 2018-12-25

## 2018-12-15 RX ORDER — ONDANSETRON 4 MG/1
4 TABLET, FILM COATED ORAL EVERY 8 HOURS PRN
Qty: 15 TABLET | Refills: 0 | Status: SHIPPED | OUTPATIENT
Start: 2018-12-15 | End: 2019-01-30

## 2018-12-15 RX ORDER — ONDANSETRON 4 MG/1
4 TABLET, ORALLY DISINTEGRATING ORAL ONCE
Status: COMPLETED | OUTPATIENT
Start: 2018-12-15 | End: 2018-12-15

## 2018-12-15 RX ADMIN — SODIUM CHLORIDE 1000 ML: 0.9 INJECTION, SOLUTION INTRAVENOUS at 01:25

## 2018-12-15 RX ADMIN — ONDANSETRON 4 MG: 4 TABLET, ORALLY DISINTEGRATING ORAL at 03:12

## 2018-12-15 RX ADMIN — IOHEXOL 100 ML: 350 INJECTION, SOLUTION INTRAVENOUS at 02:20

## 2018-12-15 RX ADMIN — KETOROLAC TROMETHAMINE 30 MG: 30 INJECTION, SOLUTION INTRAMUSCULAR at 01:48

## 2018-12-15 RX ADMIN — HYDROCODONE BITARTRATE AND ACETAMINOPHEN 1 TABLET: 5; 325 TABLET ORAL at 03:12

## 2018-12-15 NOTE — ED PROVIDER NOTES
History  Chief Complaint   Patient presents with    Abdominal Pain     per pt "she has been having some left flank pain that wraps around into her abdomen,pt was seen @ [facility] and was place on sufamethaxole 800/160mg  for a UTI, but had no relief so her family doc  called in [address] [facility], pt states she still has some urinary frequency and some burning still  "     [de-identified] year female is coming in with complaint of suprapubic pain and feeling of urinary urgency and incomplete voiding and feeling of pressure and burning when she urinates but not actual dysuria that has been going on for about 1 week  She started with having some lower abdominal pain that got a cramp that she thought was a muscle spasm that went to her left flank she stayed home from work and then had some urinary symptoms and it resolved on its own so she did not think much of it but later in the next day got more feelings of bloating in her lower abdomen and feeling of needing to urinate frequently so she went to [facility] and had a urinalysis done and was treated for a urinary tract infection  She was started on Bactrim for which she took a couple days but felt like her symptoms were not getting any better so she followed up with her family doctor who did not do any other testing but switched her to ciprofloxacin  She took that for a few days but then continued to have symptoms so called her family doctor and they just recently switched the prescription to nitrofurantoin which she started taking but continued to have persistent symptoms  She has not had any fevers, nausea vomiting or diarrhea  She occasionally will get some pain that goes to her back now  It is not sharp or intolerable  Has more of a suprapubic toward the left lower quadrant feeling of bloating  Her mother did recently get diagnosed with diverticulitis    She came in because she is now on her 3rd antibiotic and her symptoms continue to persist   She did have a kidney stone once in the past 20 years ago and has had kidney stones lasted and but when she had a kidney stone that she passed the past she said she was doubled over on the floor so this feels different  She has not had any abdominal surgeries  History provided by:  Patient  Abdominal Pain   Pain location:  Suprapubic  Pain quality: bloating    Pain radiates to:  LLQ and L flank  Pain severity:  Moderate  Onset quality:  Gradual  Duration:  1 week  Timing:  Constant  Progression:  Worsening  Chronicity:  New  Context: not previous surgeries    Relieved by:  Nothing  Worsened by:  Nothing  Ineffective treatments:  Urination and OTC medications (abx)  Associated symptoms: dysuria and fatigue    Associated symptoms: no anorexia, no chest pain, no chills, no constipation, no cough, no diarrhea, no fever, no hematuria, no nausea, no shortness of breath, no sore throat and no vomiting    Risk factors: no alcohol abuse and not elderly        Prior to Admission Medications   Prescriptions Last Dose Informant Patient Reported? Taking?    FLUoxetine (PROzac) 40 MG capsule   No Yes   Sig: Take 1 capsule (40 mg total) by mouth daily   LORazepam (ATIVAN) 0 5 mg tablet   No Yes   Sig: Take 1 tablet (0 5 mg total) by mouth daily as needed for anxiety   SYNTHROID 75 MCG tablet  Self Yes Yes   Sig: Take 75 mcg by mouth daily   loratadine (CLARITIN) 10 mg tablet  Self Yes Yes   Sig: Take 10 mg by mouth daily   nitrofurantoin (MACROBID) 100 mg capsule 12/14/2018 at 1900  No Yes   Sig: Take 1 capsule (100 mg total) by mouth 2 (two) times a day for 7 days Take with food   ranitidine (ZANTAC) 150 mg tablet  Self Yes Yes   Sig: Take 150 mg by mouth daily     rizatriptan (MAXALT) 10 MG tablet  Self No Yes   Sig: Take 1 tablet (10 mg total) by mouth once as needed for migraine for up to 1 dose May repeat in 2 hours if needed      Facility-Administered Medications: None       Past Medical History: Diagnosis Date    Anxiety     Chronic otitis externa     Last assessed 12/18/2017    Disease of thyroid gland     GERD (gastroesophageal reflux disease)     Headache     Hyperlipidemia        Past Surgical History:   Procedure Laterality Date    DILATION AND CURETTAGE OF UTERUS      EAR SURGERY      age 6    DE ESOPHAGOGASTRODUODENOSCOPY TRANSORAL DIAGNOSTIC N/A 12/27/2017    Procedure: ESOPHAGOGASTRODUODENOSCOPY (EGD); Surgeon: Carlyn Whitman MD;  [facility]; Service: Gastroenterology    TUBAL LIGATION      VEIN LIGATION AND STRIPPING Bilateral     WISDOM TOOTH EXTRACTION         Family History   Problem Relation Age of Onset    Hypertension Mother     Hypertension Father     Hyperlipidemia Father      I have reviewed and agree with the history as documented  Social History   Substance Use Topics    Smoking status: Never Smoker    Smokeless tobacco: Never Used    Alcohol use No        Review of Systems   Constitutional: Positive for fatigue  Negative for chills and fever  HENT: Negative for sore throat  Respiratory: Negative for cough and shortness of breath  Cardiovascular: Negative for chest pain  Gastrointestinal: Positive for abdominal pain  Negative for anorexia, constipation, diarrhea, nausea and vomiting  Genitourinary: Positive for dysuria  Negative for hematuria  All other systems reviewed and are negative  Physical Exam  Physical Exam   Constitutional: She is oriented to person, place, and time  She appears well-developed and well-nourished  No distress  HENT:   Head: Normocephalic and atraumatic  Nose: Nose normal    Mouth/Throat: Oropharynx is clear and moist    Eyes: Pupils are equal, round, and reactive to light  Conjunctivae and EOM are normal    Neck: Normal range of motion  Neck supple  Cardiovascular: Normal rate, regular rhythm and normal heart sounds  Pulmonary/Chest: Effort normal and breath sounds normal  No respiratory distress  Abdominal: Soft  Bowel sounds are normal  She exhibits no distension  There is tenderness (suprapubic and some LLQ)  There is no rebound and no guarding  Musculoskeletal: Normal range of motion  She exhibits no edema  Neurological: She is alert and oriented to person, place, and time  Skin: Skin is warm and dry  She is not diaphoretic  Psychiatric: She has a normal mood and affect  Nursing note and vitals reviewed        Vital Signs  ED Triage Vitals [12/15/18 0051]   Temperature Pulse Respirations Blood Pressure SpO2   (!) 97 4 °F (36 3 °C) 82 20 (!) 174/98 99 %      Temp Source Heart Rate Source Patient Position - Orthostatic VS BP Location FiO2 (%)   Oral Monitor Sitting Right arm --      Pain Score       9           Vitals:    12/15/18 0051   BP: (!) 174/98   Pulse: 82   Patient Position - Orthostatic VS: Sitting       Visual Acuity      ED Medications  Medications   sodium chloride 0 9 % bolus 1,000 mL (1,000 mL Intravenous New Bag 12/15/18 0125)   ketorolac (TORADOL) injection 30 mg (not administered)       Diagnostic Studies  Results Reviewed     Procedure Component Value Units Date/Time    CBC and differential [[id number]]  (Abnormal) Collected:  12/15/18 0125    Lab Status:  Final result Specimen:  Blood from Arm, Right Updated:  12/15/18 0133     WBC 4 20 (L) Thousand/uL      RBC 3 91 Million/uL      Hemoglobin 11 8 g/dL      Hematocrit 36 0 %      MCV 92 fL      MCH 30 2 pg      MCHC 32 8 g/dL      RDW 12 7 %      MPV 10 4 fL      Platelets 736 Thousands/uL      nRBC 0 /100 WBCs      Neutrophils Relative 50 %      Immat GRANS % 0 %      Lymphocytes Relative 34 %      Monocytes Relative 10 %      Eosinophils Relative 5 %      Basophils Relative 1 %      Neutrophils Absolute 2 09 Thousands/µL      Immature Grans Absolute 0 00 Thousand/uL      Lymphocytes Absolute 1 44 Thousands/µL      Monocytes Absolute 0 40 Thousand/µL      Eosinophils Absolute 0 22 Thousand/µL      Basophils Absolute 0 05 Thousands/µL     Comprehensive metabolic panel [[id number]] Collected:  12/15/18 0125    Lab Status: In process Specimen:  Blood from Arm, Right Updated:  12/15/18 0130    Lipase [[id number]] Collected:  12/15/18 0125    Lab Status: In process Specimen:  Blood from Arm, Right Updated:  12/15/18 0130    UA w Reflex to Microscopic w Reflex to Culture [[id number]]     Lab Status:  No result Specimen:  Urine     POCT pregnancy, urine [[id number]]     Lab Status:  No result Specimen:  Urine                  CT chest abdomen pelvis w contrast    (Results Pending)              Procedures  Procedures       Phone Contacts  ED Phone Contact    ED Course                               MDM  Number of Diagnoses or Management Options  Suprapubic abdominal pain: new and requires workup     Amount and/or Complexity of Data Reviewed  Clinical lab tests: ordered  Tests in the radiology section of CPT®: ordered  Review and summarize past medical records: yes (Reviewed her prior records and she had a UA on 12/08 that showed blood and moderate leukocyte, urine culture grew out 50-59,000 amount of alpha-hemolytic strep )    Risk of Complications, Morbidity, and/or Mortality  Presenting problems: high    Patient Progress  Patient progress: (Pt signed out at 1:45am to Dr Emelia Wolff pending labs and CT scan )    CritCare Time    Disposition  Final diagnoses:   Suprapubic abdominal pain     Time reflects when diagnosis was documented in both MDM as applicable and the Disposition within this note     Time User Action Codes Description Comment    12/15/2018  1:32 AM Dolly, [address] [R10 2] Suprapubic abdominal pain       ED Disposition     None      Follow-up Information    None         Patient's Medications   Discharge Prescriptions    No medications on file     No discharge procedures on file      ED Provider  Electronically Signed by           Jerome Roman MD  12/15/18 0179

## 2018-12-15 NOTE — ED NOTES
Made Dr Oly Florence aware of patient's continuing pain to flank/back/pelvic area  Pain relief from Toradol only minimally effective  Patient drove self to Emergency Department  Made patient aware that if narcotic pain medications given, she would need to secure a ride home  Patient states that she will wait until discharge to fill script for pain medication  Aware to notify staff if she changes her mind and desires further pain medication       Gabino Albrecht RN  12/15/18 [id number]

## 2018-12-15 NOTE — ED NOTES
Patient ambulatory to bathroom at this time to provide urine sample        Zenobia Hwang RN  12/15/18 [id number]

## 2018-12-15 NOTE — DISCHARGE INSTRUCTIONS
Kidney Stones   WHAT YOU NEED TO KNOW:   Kidney stones form in the urinary system when the water and waste in your urine are out of balance  When this happens, certain types of waste crystals separate from the urine  The crystals build up and form kidney stones  You may have 1 or more kidney stones  DISCHARGE INSTRUCTIONS:   Return to the emergency department if:   · You have vomiting that is not relieved by medicine  Contact your healthcare provider if:   · You have a fever  · You have trouble passing urine  · You see blood in your urine  · You have severe pain  · You have any questions or concerns about your condition or care  Medicines:   · NSAIDs , such as ibuprofen, help decrease swelling, pain, and fever  This medicine is available with or without a doctor's order  NSAIDs can cause stomach bleeding or kidney problems in certain people  If you take blood thinner medicine, always ask your healthcare provider if NSAIDs are safe for you  Always read the medicine label and follow directions  · Prescription medicine  may be given  Ask how to take this medicine safely  · Medicines  to balance your electrolytes may be needed  · Take your medicine as directed  Contact your healthcare provider if you think your medicine is not helping or if you have side effects  Tell him or her if you are allergic to any medicine  Keep a list of the medicines, vitamins, and herbs you take  Include the amounts, and when and why you take them  Bring the list or the pill bottles to follow-up visits  Carry your medicine list with you in case of an emergency  Follow up with your healthcare provider as directed: You may need to return for more tests  Write down your questions so you remember to ask them during your visits  Self-care:   · Drink plenty of liquids  Your healthcare provider may tell you to drink at least 8 to 12 (eight-ounce) cups of liquids each day   This helps flush out the kidney stones when you urinate  Water is the best liquid to drink  · Strain your urine every time you go to the bathroom  Urinate through a strainer or a piece of thin cloth to catch the stones  Take the stones to your healthcare provider so they can be sent to the lab for tests  This will help your healthcare providers plan the best treatment for you  · Eat a variety of healthy foods  Healthy foods include fruits, vegetables, whole-grain breads, low-fat dairy products, beans, and fish  You may need to limit how much sodium (salt) or protein you eat  Ask for information about the best foods for you  · Stay active  Your stones may pass more easily by if you stay active  Ask about the best activities for you  After you pass your kidney stones:  Once you have passed your kidney stones, your healthcare provider may  order a 24-hour urine test  Results from a 24-hour urine test will help your healthcare provider plan ways to prevent more stones from forming  If you are told to do a 24-hour test, your healthcare provider will give you more instructions  © 2017 [facility] Information is for End User's use only and may not be sold, redistributed or otherwise used for commercial purposes  All illustrations and images included in CareNotes® are the copyrighted property of A D A M [employer] Inc  or Milton Sandoval  The above information is an  only  It is not intended as medical advice for individual conditions or treatments  Talk to your doctor, nurse or pharmacist before following any medical regimen to see if it is safe and effective for you

## 2018-12-18 ENCOUNTER — OFFICE VISIT (OUTPATIENT)
Dept: UROLOGY | Facility: CLINIC | Age: 48
End: 2018-12-18
Payer: COMMERCIAL

## 2018-12-18 VITALS
WEIGHT: 152 LBS | SYSTOLIC BLOOD PRESSURE: 106 MMHG | HEIGHT: 70 IN | DIASTOLIC BLOOD PRESSURE: 64 MMHG | BODY MASS INDEX: 21.76 KG/M2

## 2018-12-18 DIAGNOSIS — N39.0 RECURRENT UTI: Primary | ICD-10-CM

## 2018-12-18 DIAGNOSIS — N20.1 URETERAL STONE: ICD-10-CM

## 2018-12-18 LAB
SL AMB  POCT GLUCOSE, UA: NORMAL
SL AMB LEUKOCYTE ESTERASE,UA: NORMAL
SL AMB POCT BILIRUBIN,UA: NORMAL
SL AMB POCT BLOOD,UA: NORMAL
SL AMB POCT CLARITY,UA: NORMAL
SL AMB POCT COLOR,UA: NORMAL
SL AMB POCT KETONES,UA: NORMAL
SL AMB POCT NITRITE,UA: NORMAL
SL AMB POCT PH,UA: 5
SL AMB POCT SPECIFIC GRAVITY,UA: 1.02
SL AMB POCT URINE PROTEIN: NORMAL
SL AMB POCT UROBILINOGEN: NORMAL

## 2018-12-18 PROCEDURE — 99244 OFF/OP CNSLTJ NEW/EST MOD 40: CPT | Performed by: PHYSICIAN ASSISTANT

## 2018-12-18 PROCEDURE — 81002 URINALYSIS NONAUTO W/O SCOPE: CPT | Performed by: PHYSICIAN ASSISTANT

## 2018-12-18 RX ORDER — TAMSULOSIN HYDROCHLORIDE 0.4 MG/1
0.4 CAPSULE ORAL
Qty: 14 CAPSULE | Refills: 0 | Status: ON HOLD | OUTPATIENT
Start: 2018-12-18 | End: 2019-01-11 | Stop reason: ALTCHOICE

## 2018-12-18 NOTE — PROGRESS NOTES
1  Recurrent UTI  POCT urine dip   2  Ureteral stone  tamsulosin (FLOMAX) 0 4 mg    US kidney and bladder         Assessment and plan:       1    3 mm ureteral calculus  - we discussed that given the size of the stone, there is a high likelihood that patient is able to pass on her own  We reviewed tamsulosin and hydration for medical expulsive therapy  She is provided with more strainers for her urine  Pain is well controlled at this time  Patient has or prescription for Zofran and Norco as needed as provided by the emergency department  Patient will obtain an ultrasound of the kidney and bladder in approximately 1 weeks time for re-evaluation  She is aware that should if she has evidence of obstruction that time she may require ureteroscopy for extraction of the stone  Patient is aware to contact us in the interim with any uncontrolled pain, fevers, chills, or concern of urinary infection  2    Recurrent urinary infection  - secondary to above  - complete course of nitrofurantoin that she is currently taking      An Bateman PA-C      Chief Complaint      New patient kidney stone    History of Present Illness     Courtney Ross is a 50 y o  female with a history of recurrent urinary infections and nephrolithiasis presenting as a new patient for emergency department follow-up  Patient was in the emergency department 12/15/2018 in regards to suprapubic pain, urinary urgency, incomplete voiding, and dysuria  Patient had been evaluated at an urgent care center where she was initiated on Bactrim  Her family provider had switched her cyst to ciprofloxacin and then nitrofurantoin given persistent symptoms  Patient's urine culture from 12/08/2018 revealed low colony count alpha hemolytic strep  Urinalysis with microscopy 12/05/2018 revealed trace blood  Patient reports a history nephrolithiasis over 20 years ago      A CT stone study was obtained which revealed a 3 mm obstructing distal left ureteral stone mild left hydronephrosis  Bilateral nonobstructing calculi were noted at that time  Patient reports that she had a history of kidney stones over 20 years ago  She believes she previously underwent ESWL for her kidney stones  Urine dip in the office today is leukocyte and nitrite negative  Patient is currently on a course of Macrobid on report  Laboratory     Lab Results   Component Value Date    CREATININE 0 87 12/15/2018         Review of Systems     Review of Systems   Constitutional: Negative for activity change, appetite change, chills, diaphoresis, fatigue, fever and unexpected weight change  Respiratory: Negative for chest tightness and shortness of breath  Cardiovascular: Negative for chest pain, palpitations and leg swelling  Gastrointestinal: Negative for abdominal distention, abdominal pain, constipation, diarrhea, nausea and vomiting  Genitourinary: Negative for decreased urine volume, difficulty urinating, dysuria, enuresis, flank pain, frequency, genital sores, hematuria and urgency  Musculoskeletal: Negative for back pain, gait problem and myalgias  Skin: Negative for color change, pallor, rash and wound  Psychiatric/Behavioral: Negative for behavioral problems  The patient is not nervous/anxious  Allergies     Allergies   Allergen Reactions    Biaxin [Clarithromycin] Diarrhea    Cefzil [Cefprozil] Dizziness    Flagyl [Metronidazole] Nausea Only    Percocet [Oxycodone-Acetaminophen] Nausea Only    Latex Rash       Physical Exam     Physical Exam   Constitutional: She is oriented to person, place, and time  She appears well-developed and well-nourished  No distress  HENT:   Head: Normocephalic and atraumatic  Eyes: Conjunctivae are normal    Neck: Normal range of motion  No tracheal deviation present  Pulmonary/Chest: Effort normal    Musculoskeletal: Normal range of motion  She exhibits no edema or deformity     Neurological: She is alert and oriented to person, place, and time  Skin: Skin is warm and dry  She is not diaphoretic  No erythema  No pallor  Psychiatric: She has a normal mood and affect   Her behavior is normal          Vital Signs     Vitals:    12/18/18 1309   BP: 106/64   Weight: 68 9 kg (152 lb)   Height: 5' 10" (1 778 m)         Current Medications       Current Outpatient Prescriptions:     FLUoxetine (PROzac) 40 MG capsule, Take 1 capsule (40 mg total) by mouth daily, Disp: 30 capsule, Rfl: 1    loratadine (CLARITIN) 10 mg tablet, Take 10 mg by mouth daily, Disp: , Rfl:     LORazepam (ATIVAN) 0 5 mg tablet, Take 1 tablet (0 5 mg total) by mouth daily as needed for anxiety, Disp: 15 tablet, Rfl: 0    nitrofurantoin (MACROBID) 100 mg capsule, Take 1 capsule (100 mg total) by mouth 2 (two) times a day for 7 days Take with food, Disp: 14 capsule, Rfl: 0    ranitidine (ZANTAC) 150 mg tablet, Take 150 mg by mouth daily  , Disp: , Rfl:     rizatriptan (MAXALT) 10 MG tablet, Take 1 tablet (10 mg total) by mouth once as needed for migraine for up to 1 dose May repeat in 2 hours if needed, Disp: 9 tablet, Rfl: 0    SYNTHROID 75 MCG tablet, Take 75 mcg by mouth daily, Disp: , Rfl:     HYDROcodone-acetaminophen (NORCO) 5-325 mg per tablet, Take 1 tablet by mouth every 6 (six) hours as needed for pain for up to 10 days Max Daily Amount: 4 tablets (Patient not taking: Reported on 12/18/2018 ), Disp: 12 tablet, Rfl: 0    ondansetron (ZOFRAN) 4 mg tablet, Take 1 tablet (4 mg total) by mouth every 8 (eight) hours as needed for nausea or vomiting (Patient not taking: Reported on 12/18/2018 ), Disp: 15 tablet, Rfl: 0    tamsulosin (FLOMAX) 0 4 mg, Take 1 capsule (0 4 mg total) by mouth daily with dinner, Disp: 14 capsule, Rfl: 0      Active Problems     Patient Active Problem List   Diagnosis    Influenza    Allergic rhinitis    Anxiety disorder    Early menopause    Esophageal reflux    Hyperlipidemia    Rhus dermatitis    Migraines    Lower back pain    Lactose intolerance    Hypothyroidism due to acquired atrophy of thyroid    Sprain of ulnar collateral ligament of left elbow    Acute cystitis with hematuria         Past Medical History     Past Medical History:   Diagnosis Date    Anxiety     Chronic otitis externa     Last assessed 12/18/2017    Disease of thyroid gland     GERD (gastroesophageal reflux disease)     Headache     Hyperlipidemia          Surgical History     Past Surgical History:   Procedure Laterality Date    DILATION AND CURETTAGE OF UTERUS      EAR SURGERY      age 6    [place] ESOPHAGOGASTRODUODENOSCOPY TRANSORAL DIAGNOSTIC N/A 12/27/2017    Procedure: ESOPHAGOGASTRODUODENOSCOPY (EGD); Surgeon: Chilango Copeland MD;  [facility];   Service: Gastroenterology    TUBAL LIGATION      VEIN LIGATION AND STRIPPING Bilateral     WISDOM TOOTH EXTRACTION           Family History     Family History   Problem Relation Age of Onset    Hypertension Mother     Hypertension Father     Hyperlipidemia Father          Social History     Social History       Radiology

## 2018-12-26 ENCOUNTER — TELEPHONE (OUTPATIENT)
Dept: UROLOGY | Facility: CLINIC | Age: 48
End: 2018-12-26

## 2018-12-26 ENCOUNTER — HOSPITAL ENCOUNTER (OUTPATIENT)
Dept: ULTRASOUND IMAGING | Facility: HOSPITAL | Age: 48
Discharge: HOME | End: 2018-12-26
Payer: COMMERCIAL

## 2018-12-26 DIAGNOSIS — N20.1 URETERAL STONE: ICD-10-CM

## 2018-12-26 PROCEDURE — 76770 US EXAM ABDO BACK WALL COMP: CPT

## 2018-12-26 NOTE — TELEPHONE ENCOUNTER
Patient seen by Antoni Griffin at [facility]  Message below regarding her kidney stone  She is scheduled to have and US kidney and bladder to evaluate her nephrolithiasis  Please advise if patient should continue to take Tamsulosin or if she should continue taking it for other reasons  Send back to [facility] pool        ----- Message from Santino Centeno sent at 12/25/2018  4:22 PM EST -----  Regarding: Non-Urgent Medical Question  Contact: [phone]  I passed the kidney stone! Should I still continue taking the Tamsulosin?

## 2018-12-26 NOTE — TELEPHONE ENCOUNTER
Patient wants to know if ultrasound should be done or not? She says she passed a  kidney stone    Her ultrasound is today at 1pm

## 2018-12-26 NOTE — TELEPHONE ENCOUNTER
Call to patient and informed her to still have the [address] completed today to ensure that all stone and any possible fragments have completely passed  Patient verbalized understanding to me

## 2018-12-26 NOTE — TELEPHONE ENCOUNTER
Patient does not need to continue taking Flomax if she reports passing stone  If possible, she should bring specimen to next office visit

## 2018-12-26 NOTE — TELEPHONE ENCOUNTER
Call to patient's home phone and left a detailed message of the instructions below on her voicemail

## 2018-12-31 ENCOUNTER — OFFICE VISIT (OUTPATIENT)
Dept: UROLOGY | Facility: CLINIC | Age: 48
End: 2018-12-31
Payer: COMMERCIAL

## 2018-12-31 VITALS
BODY MASS INDEX: 24.55 KG/M2 | HEIGHT: 67 IN | WEIGHT: 156.4 LBS | SYSTOLIC BLOOD PRESSURE: 112 MMHG | HEART RATE: 88 BPM | DIASTOLIC BLOOD PRESSURE: 64 MMHG

## 2018-12-31 DIAGNOSIS — N39.0 URINARY TRACT INFECTION WITHOUT HEMATURIA, SITE UNSPECIFIED: ICD-10-CM

## 2018-12-31 DIAGNOSIS — N20.0 KIDNEY STONE: Primary | ICD-10-CM

## 2018-12-31 LAB
SL AMB  POCT GLUCOSE, UA: ABNORMAL
SL AMB LEUKOCYTE ESTERASE,UA: ABNORMAL
SL AMB POCT BILIRUBIN,UA: ABNORMAL
SL AMB POCT BLOOD,UA: ABNORMAL
SL AMB POCT CLARITY,UA: CLEAR
SL AMB POCT COLOR,UA: YELLOW
SL AMB POCT KETONES,UA: ABNORMAL
SL AMB POCT NITRITE,UA: ABNORMAL
SL AMB POCT PH,UA: 7
SL AMB POCT SPECIFIC GRAVITY,UA: 1.01
SL AMB POCT URINE PROTEIN: ABNORMAL
SL AMB POCT UROBILINOGEN: 0.2

## 2018-12-31 PROCEDURE — 99214 OFFICE O/P EST MOD 30 MIN: CPT | Performed by: UROLOGY

## 2018-12-31 PROCEDURE — 81002 URINALYSIS NONAUTO W/O SCOPE: CPT | Performed by: UROLOGY

## 2018-12-31 PROCEDURE — 87086 URINE CULTURE/COLONY COUNT: CPT | Performed by: UROLOGY

## 2018-12-31 PROCEDURE — 82360 CALCULUS ASSAY QUANT: CPT | Performed by: UROLOGY

## 2018-12-31 RX ORDER — CIPROFLOXACIN 2 MG/ML
400 INJECTION, SOLUTION INTRAVENOUS ONCE
Status: CANCELLED | OUTPATIENT
Start: 2018-12-31 | End: 2018-12-31

## 2018-12-31 NOTE — PROGRESS NOTES
Referring Physician: Deloris Lopez DO  A copy of this consultation note was communicated to the referring physician  Diagnoses and all orders for this visit:    Kidney stone  -     POCT urine dip  -     Stone analysis  -     Ambulatory referral to Nephrology; Future  -     Case request operating room: CYSTOSCOPY URETEROSCOPY WITH LITHOTRIPSY HOLMIUM LASER, RETROGRADE PYELOGRAM AND INSERTION STENT URETERAL; Standing  -     Case request operating room: CYSTOSCOPY URETEROSCOPY WITH LITHOTRIPSY HOLMIUM LASER, RETROGRADE PYELOGRAM AND INSERTION STENT URETERAL    Other orders  -     Diet NPO; Sips with meds; Standing  -     Place sequential compression device; Standing  -     ciprofloxacin (CIPRO) IVPB (premix) 400 mg; Infuse 200 mL (400 mg total) into a venous catheter once             Assessment and plan:     Bandar Modi is a 50 y o  who initially presented with a 3 mm obstructing left ureteral calculus and presents in follow-up after trial of passage with medical expulsive therapy  Repeat ultrasound does demonstrate persistent mild left hydronephrosis  She also has a larger intrarenal calculus on that left side  She did successfully passed her distal stone and brings it in hand today  It will be sent out for analysis    We therefore discussed management options for her intrarenal stone burden from which he is presently asymptomatic  Discussed options for management of the patient's ureteral stone  We discussed surgical options including ureteroscopy and shock wave lithotripsy  In addition we discussed conservative management with medical expulsive therapy  The stone does not appear to be radiopaque on the  of her CT scan  For that reason I do not believe he would be a good candidate for shockwave lithotripsy            The patient has elected to undergo ureteroscopy  I discussed with the patients risks and benefits and alternatives to ureteroscopy with laser lithotripsy    The risks include bleeding, infection, injury to the urethra, bladder, ureter or kidney, risk of a staged procedure, risk of stricture, risk of residual fragments, risk of loss of kidney, risks of anesthesia including DVT, PE, MI and death  The patient understands that a ureteral stent will likely be left in place at the time of the procedure  We reviewed the expected postoperative care  The patient understands these risks and has provided informed consent for  LEFT ureteroscopy  Chaka Cruz MD      Chief Complaint     Nephrolithiasis      History of Present Illness     Robert Carrasco is a 50 y o    female returns in follow-up for nephrolithiasis  Patient recently presented with flank pain and was found to have a 3 mm left distal ureteral calculus as well as a 8 mm intrarenal calculus and a contralateral 3 mm intrarenal calculus  Patient was initiated on medical expulsive therapy and passed her stone without much discomfort 48 hr later  She brings it in hand today  At the present time she remains asymptomatic  Detailed Urologic History     - please refer to HPI    Review of Systems       Review of Systems   Constitutional: Negative for activity change and fatigue  HENT: Negative for congestion  Eyes: Negative for visual disturbance  Respiratory: Negative for shortness of breath and wheezing  Cardiovascular: Negative for chest pain and leg swelling  Gastrointestinal: Negative for abdominal pain  Endocrine: Negative for polyuria  Genitourinary: Positive for flank pain  Negative for dysuria, hematuria and urgency  Musculoskeletal: Negative for back pain  Allergic/Immunologic: Negative for immunocompromised state  Neurological: Negative for dizziness and numbness  Psychiatric/Behavioral: Negative for dysphoric mood  All other systems reviewed and are negative            Allergies     Allergies   Allergen Reactions    Biaxin [Clarithromycin] Diarrhea    Cefzil [Cefprozil] Dizziness    Flagyl [Metronidazole] Nausea Only    Percocet [Oxycodone-Acetaminophen] Nausea Only    Latex Rash       Physical Exam       Physical Exam   Constitutional: Oriented to person, place, and time  Appears well-developed and well-nourished  No distress  HENT:   Head: Normocephalic and atraumatic  Eyes: EOM are normal    Neck: Normal range of motion  Cardiovascular: Normal rate  Pulmonary/Chest: Effort normal and breath sounds normal    Abdominal: Soft  Genitourinary:   Genitourinary Comments: Mild CVA tenderness   Musculoskeletal: Normal range of motion  Neurological: Aalert and oriented to person, place, and time  Skin: Skin is warm  Psychiatric: Nnormal mood and affect  Behavior is normal        Vital Signs  There were no vitals filed for this visit        Current Medications       Current Outpatient Prescriptions:     FLUoxetine (PROzac) 40 MG capsule, Take 1 capsule (40 mg total) by mouth daily, Disp: 30 capsule, Rfl: 1    loratadine (CLARITIN) 10 mg tablet, Take 10 mg by mouth daily, Disp: , Rfl:     LORazepam (ATIVAN) 0 5 mg tablet, Take 1 tablet (0 5 mg total) by mouth daily as needed for anxiety, Disp: 15 tablet, Rfl: 0    ondansetron (ZOFRAN) 4 mg tablet, Take 1 tablet (4 mg total) by mouth every 8 (eight) hours as needed for nausea or vomiting (Patient not taking: Reported on 12/18/2018 ), Disp: 15 tablet, Rfl: 0    ranitidine (ZANTAC) 150 mg tablet, Take 150 mg by mouth daily  , Disp: , Rfl:     rizatriptan (MAXALT) 10 MG tablet, Take 1 tablet (10 mg total) by mouth once as needed for migraine for up to 1 dose May repeat in 2 hours if needed, Disp: 9 tablet, Rfl: 0    SYNTHROID 75 MCG tablet, Take 75 mcg by mouth daily, Disp: , Rfl:     tamsulosin (FLOMAX) 0 4 mg, Take 1 capsule (0 4 mg total) by mouth daily with dinner, Disp: 14 capsule, Rfl: 0      Active Problems     Patient Active Problem List   Diagnosis    Influenza    Allergic rhinitis    Anxiety disorder    Early menopause    Esophageal reflux    Hyperlipidemia    Rhus dermatitis    Migraines    Lower back pain    Lactose intolerance    Hypothyroidism due to acquired atrophy of thyroid    Sprain of ulnar collateral ligament of left elbow    Acute cystitis with hematuria         Past Medical History     Past Medical History:   Diagnosis Date    Anxiety     Chronic otitis externa     Last assessed 12/18/2017    Disease of thyroid gland     GERD (gastroesophageal reflux disease)     Headache     Hyperlipidemia          Surgical History     Past Surgical History:   Procedure Laterality Date    DILATION AND CURETTAGE OF UTERUS      EAR SURGERY      age 6    [place] ESOPHAGOGASTRODUODENOSCOPY TRANSORAL DIAGNOSTIC N/A 12/27/2017    Procedure: ESOPHAGOGASTRODUODENOSCOPY (EGD); Surgeon: Miguelito Ridley MD;  [facility]; Service: Gastroenterology    TUBAL LIGATION      VEIN LIGATION AND STRIPPING Bilateral     WISDOM TOOTH EXTRACTION           Family History     Family History   Problem Relation Age of Onset    Hypertension Mother     Hypertension Father     Hyperlipidemia Father          Social History     Social History     History   Smoking Status    Never Smoker   Smokeless Tobacco    Never Used         Pertinent Lab Values     Lab Results   Component Value Date    CREATININE 0 87 12/15/2018       No results found for: PSA    @RESULTRCNT(1H])@      Pertinent Imaging      - n/a      Portions of the record may have been created with voice recognition software   Occasional wrong word or "sound a like" substitutions may have occurred due to the inherent limitations of voice recognition software   Read the chart carefully and recognize, using context, where substitutions have occurred

## 2019-01-01 LAB — BACTERIA UR CULT: NORMAL

## 2019-01-02 ENCOUNTER — CONSULT (OUTPATIENT)
Dept: NEPHROLOGY | Facility: CLINIC | Age: 49
End: 2019-01-02
Payer: COMMERCIAL

## 2019-01-02 VITALS
SYSTOLIC BLOOD PRESSURE: 128 MMHG | HEIGHT: 67 IN | WEIGHT: 156 LBS | DIASTOLIC BLOOD PRESSURE: 78 MMHG | HEART RATE: 90 BPM | BODY MASS INDEX: 24.48 KG/M2

## 2019-01-02 DIAGNOSIS — N20.0 KIDNEY STONE: ICD-10-CM

## 2019-01-02 DIAGNOSIS — N20.0 NEPHROLITHIASIS: Primary | ICD-10-CM

## 2019-01-02 PROCEDURE — 99244 OFF/OP CNSLTJ NEW/EST MOD 40: CPT | Performed by: INTERNAL MEDICINE

## 2019-01-02 NOTE — LETTER
January 2, 2019     [address]  [address]  [facility] [id number]    Patient: Madhavi Rowan   YOB: 1970   Date of Visit: 1/2/2019       Dear Dr Maddie Gaxiola: Thank you for referring Taylor Almazan to me for evaluation  Below are my notes for this consultation  If you have questions, please do not hesitate to call me  I look forward to following your patient along with you  Sincerely,        Dayne Diego MD        CC: No Recipients  Dayne Diego MD  1/2/2019  1:45 PM  Sign at close encounter  Consultation - Nephrology   Madhavi Rowan 50 y o  female MRN: [id number]  Unit/Bed#:  Encounter: [id number]      Assessment/Plan     Assessment / Plan:  1  Nephrolithiasis    The patient has history of nephrolithiasis and she recently had a renal stone event is taking the sample to the lab for evaluation but I do not have that available  More than likely it is some sort of calcium stone in the question is whether not will be calcium oxalate or calcium phosphate  Given her history of multiple events and presently having to relatively large stone she warrants workup  Her renal function is normal and electrolytes are normal   The urine pH to happened to be 7 and the issue could be if she has had calcium phosphate stones that she would have an incomplete distal RTA  Regardless these patients usually have hypercalciuria and low urinary citrate so we will await the 24 hour urine collection  24 hour urine stone risk profile  Make sure have increased fluid intake to maintain urine output at least 2 L per day  No change in diet for now    Will continue with Urology plan for stone removal given the size of the present stones that she presents is        History of Present Illness   Physician Requesting Consult: No att  providers found  Reason for Consult / Principal Problem:  Nephrolithiasis  Hx and PE limited by:   HPI: Madhavi Rowan is a 50y o  year old female who presents for evaluation for kidney stone prevention  The patient states that 20 years ago she had passed a kidney stone then most recently she developed flank pain and was found to have a kidney stone and presently still has 2 kidney stones that were nonobstructing  She is here for evaluation and potential treatment for kidney stone prevention  History obtained from chart review and the patient  Consults    Review of Systems   Constitutional: Negative  HENT: Negative  Eyes: Negative  Respiratory: Negative  Negative for cough and shortness of breath  Cardiovascular: Negative  Negative for chest pain and leg swelling  Gastrointestinal: Negative for abdominal distention, abdominal pain, diarrhea, nausea and vomiting  At times will get severe heartburn and vomit  Endocrine: Negative  Negative for polydipsia and polyuria  Genitourinary: Negative  Negative for difficulty urinating, dysuria and hematuria  Musculoskeletal: Negative for arthralgias and myalgias  Neurological: Negative for headaches  Psychiatric/Behavioral: The patient is nervous/anxious          Historical Information   Patient Active Problem List   Diagnosis    Influenza    Allergic rhinitis    Anxiety disorder    Early menopause    Esophageal reflux    Hyperlipidemia    Rhus dermatitis    Migraines    Lower back pain    Lactose intolerance    Hypothyroidism due to acquired atrophy of thyroid    Sprain of ulnar collateral ligament of left elbow    Acute cystitis with hematuria    Nephrolithiasis     Past Medical History:   Diagnosis Date    Anxiety     Chronic otitis externa     Last assessed 12/18/2017    Disease of thyroid gland     GERD (gastroesophageal reflux disease)     Headache     Hyperlipidemia      Past Surgical History:   Procedure Laterality Date    DILATION AND CURETTAGE OF UTERUS      EAR SURGERY      age 6    [place] ESOPHAGOGASTRODUODENOSCOPY TRANSORAL DIAGNOSTIC N/A 12/27/2017 Procedure: ESOPHAGOGASTRODUODENOSCOPY (EGD); Surgeon: Rosie Huerta MD;  [facility]; Service: Gastroenterology    TUBAL LIGATION      VEIN LIGATION AND STRIPPING Bilateral     WISDOM TOOTH EXTRACTION       Social History   History   Alcohol Use No     History   Drug Use No     History   Smoking Status    Never Smoker   Smokeless Tobacco    Never Used     Family History   Problem Relation Age of Onset    Hypertension Mother     Hypertension Father     Hyperlipidemia Father        Meds/Allergies   current meds:   No current facility-administered medications for this visit  Allergies   Allergen Reactions    Biaxin [Clarithromycin] Diarrhea    Cefzil [Cefprozil] Dizziness    Flagyl [Metronidazole] Nausea Only    Latex Rash    Percocet [Oxycodone-Acetaminophen] Nausea Only       Objective   [unfilled]  Body mass index is 24 43 kg/m²  Invasive Devices:        PHYSICAL EXAM:  /78 (BP Location: Right arm, Patient Position: Sitting, Cuff Size: Standard)   Pulse 90   Ht 5' 7" (1 702 m)   Wt 70 8 kg (156 lb)   BMI 24 43 kg/m²      Physical Exam   Constitutional: She is oriented to person, place, and time  She appears well-nourished  No distress  HENT:   Head: Atraumatic  Mouth/Throat: No oropharyngeal exudate  Eyes: Conjunctivae are normal  No scleral icterus  Neck: Neck supple  No JVD present  Cardiovascular: Normal rate and regular rhythm  No edema  Pulmonary/Chest: Effort normal and breath sounds normal  No respiratory distress  Abdominal: Bowel sounds are normal  She exhibits no distension  There is no tenderness  Neurological: She is alert and oriented to person, place, and time           Current Weight: Weight - Scale: 70 8 kg (156 lb)  First Weight: Weight - Scale: 70 8 kg (156 lb)    Lab Results:              Invalid input(s): LABGLOM        Invalid input(s): LABALBU

## 2019-01-02 NOTE — PROGRESS NOTES
Consultation - Nephrology   Deidre Castellano 50 y o  female MRN: [id number]  Unit/Bed#:  Encounter: [id number]      Assessment/Plan     Assessment / Plan:  1  Nephrolithiasis    The patient has history of nephrolithiasis and she recently had a renal stone event is taking the sample to the lab for evaluation but I do not have that available  More than likely it is some sort of calcium stone in the question is whether not will be calcium oxalate or calcium phosphate  Given her history of multiple events and presently having to relatively large stone she warrants workup  Her renal function is normal and electrolytes are normal   The urine pH to happened to be 7 and the issue could be if she has had calcium phosphate stones that she would have an incomplete distal RTA  Regardless these patients usually have hypercalciuria and low urinary citrate so we will await the 24 hour urine collection  24 hour urine stone risk profile  Make sure have increased fluid intake to maintain urine output at least 2 L per day  No change in diet for now    Will continue with Urology plan for stone removal given the size of the present stones that she presents is  History of Present Illness   Physician Requesting Consult: No att  providers found  Reason for Consult / Principal Problem:  Nephrolithiasis  Hx and PE limited by:   HPI: Deidre Castellano is a 50y o  year old female who presents for evaluation for kidney stone prevention  The patient states that 20 years ago she had passed a kidney stone then most recently she developed flank pain and was found to have a kidney stone and presently still has 2 kidney stones that were nonobstructing  She is here for evaluation and potential treatment for kidney stone prevention  History obtained from chart review and the patient  Consults    Review of Systems   Constitutional: Negative  HENT: Negative  Eyes: Negative  Respiratory: Negative    Negative for cough and shortness of breath  Cardiovascular: Negative  Negative for chest pain and leg swelling  Gastrointestinal: Negative for abdominal distention, abdominal pain, diarrhea, nausea and vomiting  At times will get severe heartburn and vomit  Endocrine: Negative  Negative for polydipsia and polyuria  Genitourinary: Negative  Negative for difficulty urinating, dysuria and hematuria  Musculoskeletal: Negative for arthralgias and myalgias  Neurological: Negative for headaches  Psychiatric/Behavioral: The patient is nervous/anxious  Historical Information   Patient Active Problem List   Diagnosis    Influenza    Allergic rhinitis    Anxiety disorder    Early menopause    Esophageal reflux    Hyperlipidemia    Rhus dermatitis    Migraines    Lower back pain    Lactose intolerance    Hypothyroidism due to acquired atrophy of thyroid    Sprain of ulnar collateral ligament of left elbow    Acute cystitis with hematuria    Nephrolithiasis     Past Medical History:   Diagnosis Date    Anxiety     Chronic otitis externa     Last assessed 12/18/2017    Disease of thyroid gland     GERD (gastroesophageal reflux disease)     Headache     Hyperlipidemia      Past Surgical History:   Procedure Laterality Date    DILATION AND CURETTAGE OF UTERUS      EAR SURGERY      age 6    ME ESOPHAGOGASTRODUODENOSCOPY TRANSORAL DIAGNOSTIC N/A 12/27/2017    Procedure: ESOPHAGOGASTRODUODENOSCOPY (EGD); Surgeon: Rosie Huerta MD;  [facility];   Service: Gastroenterology    TUBAL LIGATION      VEIN LIGATION AND STRIPPING Bilateral     WISDOM TOOTH EXTRACTION       Social History   History   Alcohol Use No     History   Drug Use No     History   Smoking Status    Never Smoker   Smokeless Tobacco    Never Used     Family History   Problem Relation Age of Onset    Hypertension Mother     Hypertension Father     Hyperlipidemia Father        Meds/Allergies   current meds:   No current facility-administered medications for this visit  Allergies   Allergen Reactions    Biaxin [Clarithromycin] Diarrhea    Cefzil [Cefprozil] Dizziness    Flagyl [Metronidazole] Nausea Only    Latex Rash    Percocet [Oxycodone-Acetaminophen] Nausea Only       Objective   [unfilled]  Body mass index is 24 43 kg/m²  Invasive Devices:        PHYSICAL EXAM:  /78 (BP Location: Right arm, Patient Position: Sitting, Cuff Size: Standard)   Pulse 90   Ht 5' 7" (1 702 m)   Wt 70 8 kg (156 lb)   BMI 24 43 kg/m²     Physical Exam   Constitutional: She is oriented to person, place, and time  She appears well-nourished  No distress  HENT:   Head: Atraumatic  Mouth/Throat: No oropharyngeal exudate  Eyes: Conjunctivae are normal  No scleral icterus  Neck: Neck supple  No JVD present  Cardiovascular: Normal rate and regular rhythm  No edema  Pulmonary/Chest: Effort normal and breath sounds normal  No respiratory distress  Abdominal: Bowel sounds are normal  She exhibits no distension  There is no tenderness  Neurological: She is alert and oriented to person, place, and time           Current Weight: Weight - Scale: 70 8 kg (156 lb)  First Weight: Weight - Scale: 70 8 kg (156 lb)    Lab Results:              Invalid input(s): LABGLOM        Invalid input(s): LABALBU

## 2019-01-02 NOTE — PATIENT INSTRUCTIONS
You are here for your initial evaluation for kidney stone prevention  We reviewed her history  I discussed with you the different substances that can cause stones in the urine  Obtain 24 hour urine collection as discussed and I will contact you with these results and discuss possible dietary treatments or medical treatments  Continue to make arrangements with Urology for dealing with the current stones that you have

## 2019-01-03 ENCOUNTER — ANESTHESIA EVENT (OUTPATIENT)
Dept: PERIOP | Facility: AMBULARY SURGERY CENTER | Age: 49
End: 2019-01-03
Payer: COMMERCIAL

## 2019-01-03 PROBLEM — N20.0 KIDNEY STONE: Status: ACTIVE | Noted: 2019-01-03

## 2019-01-07 ENCOUNTER — APPOINTMENT (OUTPATIENT)
Dept: LAB | Facility: CLINIC | Age: 49
End: 2019-01-07
Payer: COMMERCIAL

## 2019-01-07 LAB
CA PHOS MFR STONE: 10 %
CALCIUM OXALATE DIHYDRATE MFR STONE IR: 25 %
COLOR STONE: NORMAL
COM MFR STONE: 65 %
COMMENT-STONE3: NORMAL
COMPOSITION: NORMAL
LABORATORY COMMENT REPORT: NORMAL
NIDUS STONE QL: NORMAL
PHOTO: NORMAL
SIZE STONE: NORMAL MM
STONE ANALYSIS-IMP: NORMAL
SURFACE CRYSTALS: NORMAL
WT STONE: 20 MG

## 2019-01-07 PROCEDURE — 81003 URINALYSIS AUTO W/O SCOPE: CPT | Performed by: INTERNAL MEDICINE

## 2019-01-07 PROCEDURE — 82436 ASSAY OF URINE CHLORIDE: CPT | Performed by: INTERNAL MEDICINE

## 2019-01-07 PROCEDURE — 84300 ASSAY OF URINE SODIUM: CPT | Performed by: INTERNAL MEDICINE

## 2019-01-07 PROCEDURE — 83935 ASSAY OF URINE OSMOLALITY: CPT | Performed by: INTERNAL MEDICINE

## 2019-01-07 PROCEDURE — 82570 ASSAY OF URINE CREATININE: CPT | Performed by: INTERNAL MEDICINE

## 2019-01-07 PROCEDURE — 82131 AMINO ACIDS SINGLE QUANT: CPT | Performed by: INTERNAL MEDICINE

## 2019-01-07 PROCEDURE — 83945 ASSAY OF OXALATE: CPT | Performed by: INTERNAL MEDICINE

## 2019-01-07 PROCEDURE — 82507 ASSAY OF CITRATE: CPT | Performed by: INTERNAL MEDICINE

## 2019-01-07 PROCEDURE — 84133 ASSAY OF URINE POTASSIUM: CPT | Performed by: INTERNAL MEDICINE

## 2019-01-07 PROCEDURE — 82140 ASSAY OF AMMONIA: CPT | Performed by: INTERNAL MEDICINE

## 2019-01-07 PROCEDURE — 84392 ASSAY OF URINE SULFATE: CPT | Performed by: INTERNAL MEDICINE

## 2019-01-07 PROCEDURE — 84560 ASSAY OF URINE/URIC ACID: CPT | Performed by: INTERNAL MEDICINE

## 2019-01-07 PROCEDURE — 84105 ASSAY OF URINE PHOSPHORUS: CPT | Performed by: INTERNAL MEDICINE

## 2019-01-07 PROCEDURE — 83735 ASSAY OF MAGNESIUM: CPT | Performed by: INTERNAL MEDICINE

## 2019-01-07 PROCEDURE — 82340 ASSAY OF CALCIUM IN URINE: CPT | Performed by: INTERNAL MEDICINE

## 2019-01-08 NOTE — PRE-PROCEDURE INSTRUCTIONS
Pre-Surgery Instructions:   Medication Instructions    FLUoxetine (PROzac) 40 MG capsule Instructed patient per Anesthesia Guidelines   loratadine (CLARITIN) 10 mg tablet Instructed patient per Anesthesia Guidelines   LORazepam (ATIVAN) 0 5 mg tablet Instructed patient per Anesthesia Guidelines   ranitidine (ZANTAC) 150 mg tablet Instructed patient per Anesthesia Guidelines   rizatriptan (MAXALT) 10 MG tablet Instructed patient per Anesthesia Guidelines   SYNTHROID 75 MCG tablet Instructed patient per Anesthesia Guidelines      Pre op and showering instructions using an antibacterial soap reviewed

## 2019-01-11 ENCOUNTER — TELEPHONE (OUTPATIENT)
Dept: UROLOGY | Facility: CLINIC | Age: 49
End: 2019-01-11

## 2019-01-11 ENCOUNTER — HOSPITAL ENCOUNTER (OUTPATIENT)
Facility: AMBULARY SURGERY CENTER | Age: 49
Setting detail: OUTPATIENT SURGERY
Discharge: HOME | End: 2019-01-11
Attending: UROLOGY | Admitting: UROLOGY
Payer: COMMERCIAL

## 2019-01-11 ENCOUNTER — APPOINTMENT (OUTPATIENT)
Dept: RADIOLOGY | Facility: AMBULARY SURGERY CENTER | Age: 49
End: 2019-01-11
Payer: COMMERCIAL

## 2019-01-11 ENCOUNTER — ANESTHESIA (OUTPATIENT)
Dept: PERIOP | Facility: AMBULARY SURGERY CENTER | Age: 49
End: 2019-01-11
Payer: COMMERCIAL

## 2019-01-11 VITALS
BODY MASS INDEX: 21.62 KG/M2 | HEART RATE: 80 BPM | RESPIRATION RATE: 18 BRPM | SYSTOLIC BLOOD PRESSURE: 140 MMHG | HEIGHT: 70 IN | TEMPERATURE: 98 F | WEIGHT: 151 LBS | DIASTOLIC BLOOD PRESSURE: 82 MMHG | OXYGEN SATURATION: 98 %

## 2019-01-11 DIAGNOSIS — N20.0 KIDNEY STONE: Primary | ICD-10-CM

## 2019-01-11 LAB
AMMONIA 24H UR-MRATE: 9 MEQ/24 HR
AMMONIA UR-SCNC: ABNORMAL UG/DL
CA H2 PHOS DIHYD CRY URNS QL MICRO: 5.21 RATIO (ref 0–3)
CALCIUM 24H UR-MCNC: 14.5 MG/DL
CALCIUM 24H UR-MRATE: 116 MG/24 HR (ref 100–300)
CHLORIDE 24H UR-SCNC: 52 MMOL/L
CHLORIDE 24H UR-SRATE: 42 MMOL/24 HR (ref 110–250)
CITRATE 24H UR-MCNC: 396 MG/L
CITRATE 24H UR-MRATE: 317 MG/24 HR (ref 320–1240)
COM CRY STONE QL IR: 4.58 RATIO (ref 0–6)
CREAT 24H UR-MCNC: 98.4 MG/DL
CREAT 24H UR-MRATE: 787.2 MG/24 HR (ref 800–1800)
CYSTINE 24H UR-MCNC: 5.75 MG/L
CYSTINE 24H UR-MRATE: 4.6 MG/24 HR (ref 10–100)
MAGNESIUM 24H UR-MRATE: 46 MG/24 HR (ref 12–293)
MAGNESIUM UR-MCNC: 5.8 MG/DL
NA URATE CRY STONE QL IR: 4.55 RATIO (ref 0–4)
OSMOLALITY UR: 498 MOSMOL/KG (ref 300–900)
OXALATE 24H UR-MRATE: 12 MG/24 HR (ref 4–31)
OXALATE UR-MCNC: 15 MG/L
PH 24H UR: 6.7 [PH]
PHOSPHATE 24H UR-MCNC: 83.5 MG/DL
PHOSPHATE 24H UR-MRATE: 668 MG/24 HR (ref 400–1300)
PLEASE NOTE (STONE RISK): ABNORMAL
POTASSIUM 24H UR-SCNC: 31.2 MMOL/24 HR (ref 25–125)
POTASSIUM UR-SCNC: 39 MMOL/L
PRESERVED URINE: 800 ML/24 HR (ref 600–1600)
SODIUM 24H UR-SCNC: 90 MMOL/L
SODIUM 24H UR-SRATE: 72 MMOL/24 HR (ref 39–258)
SPECIMEN VOL 24H UR: 800 ML/24 HR (ref 600–1600)
SULFATE 24H UR-MCNC: 15 MEQ/24 HR (ref 0–30)
SULFATE UR-MCNC: 19 MEQ/L
TRI-PHOS CRY STONE MICRO: 0.14 RATIO (ref 0–1)
URATE 24H UR-MCNC: 43.8 MG/DL
URATE 24H UR-MRATE: 350 MG/24 HR (ref 250–750)
URATE DIHYD CRY STONE QL IR: 0.41 RATIO (ref 0–1.2)

## 2019-01-11 PROCEDURE — 74420 UROGRAPHY RTRGR +-KUB: CPT

## 2019-01-11 PROCEDURE — C2617 STENT, NON-COR, TEM W/O DEL: HCPCS | Performed by: UROLOGY

## 2019-01-11 PROCEDURE — C1769 GUIDE WIRE: HCPCS | Performed by: UROLOGY

## 2019-01-11 PROCEDURE — 52356 CYSTO/URETERO W/LITHOTRIPSY: CPT | Performed by: UROLOGY

## 2019-01-11 DEVICE — STENT URETERAL 6 FR 26CM INLAY OPTIMA: Type: IMPLANTABLE DEVICE | Site: URINARY BLADDER | Status: FUNCTIONAL

## 2019-01-11 RX ORDER — OXYCODONE HYDROCHLORIDE 5 MG/1
5 TABLET ORAL EVERY 4 HOURS PRN
Status: DISCONTINUED | OUTPATIENT
Start: 2019-01-11 | End: 2019-01-11 | Stop reason: HOSPADM

## 2019-01-11 RX ORDER — HYDROCODONE BITARTRATE AND ACETAMINOPHEN 5; 325 MG/1; MG/1
1 TABLET ORAL EVERY 6 HOURS PRN
Qty: 5 TABLET | Refills: 0 | Status: SHIPPED | OUTPATIENT
Start: 2019-01-11 | End: 2019-01-21

## 2019-01-11 RX ORDER — ONDANSETRON 2 MG/ML
4 INJECTION INTRAMUSCULAR; INTRAVENOUS ONCE AS NEEDED
Status: DISCONTINUED | OUTPATIENT
Start: 2019-01-11 | End: 2019-01-11 | Stop reason: HOSPADM

## 2019-01-11 RX ORDER — MAGNESIUM HYDROXIDE 1200 MG/15ML
LIQUID ORAL AS NEEDED
Status: DISCONTINUED | OUTPATIENT
Start: 2019-01-11 | End: 2019-01-11 | Stop reason: HOSPADM

## 2019-01-11 RX ORDER — DEXAMETHASONE SODIUM PHOSPHATE 4 MG/ML
INJECTION, SOLUTION INTRA-ARTICULAR; INTRALESIONAL; INTRAMUSCULAR; INTRAVENOUS; SOFT TISSUE AS NEEDED
Status: DISCONTINUED | OUTPATIENT
Start: 2019-01-11 | End: 2019-01-11 | Stop reason: SURG

## 2019-01-11 RX ORDER — TAMSULOSIN HYDROCHLORIDE 0.4 MG/1
0.4 CAPSULE ORAL
Qty: 15 CAPSULE | Refills: 0 | Status: SHIPPED | OUTPATIENT
Start: 2019-01-11 | End: 2019-01-30

## 2019-01-11 RX ORDER — SODIUM CHLORIDE, SODIUM LACTATE, POTASSIUM CHLORIDE, CALCIUM CHLORIDE 600; 310; 30; 20 MG/100ML; MG/100ML; MG/100ML; MG/100ML
125 INJECTION, SOLUTION INTRAVENOUS CONTINUOUS
Status: DISCONTINUED | OUTPATIENT
Start: 2019-01-11 | End: 2019-01-11

## 2019-01-11 RX ORDER — LIDOCAINE HYDROCHLORIDE 10 MG/ML
INJECTION, SOLUTION INFILTRATION; PERINEURAL AS NEEDED
Status: DISCONTINUED | OUTPATIENT
Start: 2019-01-11 | End: 2019-01-11 | Stop reason: SURG

## 2019-01-11 RX ORDER — PHENAZOPYRIDINE HYDROCHLORIDE 200 MG/1
200 TABLET, FILM COATED ORAL 3 TIMES DAILY PRN
Qty: 10 TABLET | Refills: 0 | Status: SHIPPED | OUTPATIENT
Start: 2019-01-11 | End: 2019-01-14

## 2019-01-11 RX ORDER — DOCUSATE SODIUM 100 MG/1
100 CAPSULE, LIQUID FILLED ORAL 2 TIMES DAILY
Qty: 30 CAPSULE | Refills: 0 | Status: SHIPPED | OUTPATIENT
Start: 2019-01-11 | End: 2019-07-11

## 2019-01-11 RX ORDER — SODIUM CHLORIDE 9 MG/ML
INJECTION, SOLUTION INTRAVENOUS AS NEEDED
Status: DISCONTINUED | OUTPATIENT
Start: 2019-01-11 | End: 2019-01-11 | Stop reason: HOSPADM

## 2019-01-11 RX ORDER — CEPHALEXIN 500 MG/1
500 CAPSULE ORAL EVERY 6 HOURS SCHEDULED
Qty: 3 CAPSULE | Refills: 0 | Status: SHIPPED | OUTPATIENT
Start: 2019-01-11 | End: 2019-01-12

## 2019-01-11 RX ORDER — FENTANYL CITRATE/PF 50 MCG/ML
25 SYRINGE (ML) INJECTION
Status: DISCONTINUED | OUTPATIENT
Start: 2019-01-11 | End: 2019-01-11 | Stop reason: HOSPADM

## 2019-01-11 RX ORDER — PROPOFOL 10 MG/ML
INJECTION, EMULSION INTRAVENOUS AS NEEDED
Status: DISCONTINUED | OUTPATIENT
Start: 2019-01-11 | End: 2019-01-11 | Stop reason: SURG

## 2019-01-11 RX ORDER — CIPROFLOXACIN 2 MG/ML
400 INJECTION, SOLUTION INTRAVENOUS ONCE
Status: COMPLETED | OUTPATIENT
Start: 2019-01-11 | End: 2019-01-11

## 2019-01-11 RX ORDER — KETOROLAC TROMETHAMINE 30 MG/ML
INJECTION, SOLUTION INTRAMUSCULAR; INTRAVENOUS AS NEEDED
Status: DISCONTINUED | OUTPATIENT
Start: 2019-01-11 | End: 2019-01-11 | Stop reason: SURG

## 2019-01-11 RX ORDER — FENTANYL CITRATE 50 UG/ML
INJECTION, SOLUTION INTRAMUSCULAR; INTRAVENOUS AS NEEDED
Status: DISCONTINUED | OUTPATIENT
Start: 2019-01-11 | End: 2019-01-11 | Stop reason: SURG

## 2019-01-11 RX ORDER — IBUPROFEN 200 MG
600 TABLET ORAL EVERY 6 HOURS PRN
Refills: 0
Start: 2019-01-11

## 2019-01-11 RX ORDER — ONDANSETRON 2 MG/ML
INJECTION INTRAMUSCULAR; INTRAVENOUS AS NEEDED
Status: DISCONTINUED | OUTPATIENT
Start: 2019-01-11 | End: 2019-01-11 | Stop reason: SURG

## 2019-01-11 RX ADMIN — FENTANYL CITRATE 25 MCG: 50 INJECTION, SOLUTION INTRAMUSCULAR; INTRAVENOUS at 13:29

## 2019-01-11 RX ADMIN — ONDANSETRON 4 MG: 2 INJECTION INTRAMUSCULAR; INTRAVENOUS at 12:55

## 2019-01-11 RX ADMIN — KETOROLAC TROMETHAMINE 15 MG: 30 INJECTION, SOLUTION INTRAMUSCULAR at 12:55

## 2019-01-11 RX ADMIN — LIDOCAINE HYDROCHLORIDE ANHYDROUS 50 MG: 10 INJECTION, SOLUTION INFILTRATION at 12:34

## 2019-01-11 RX ADMIN — CIPROFLOXACIN: 2 INJECTION INTRAVENOUS at 12:26

## 2019-01-11 RX ADMIN — FENTANYL CITRATE 25 MCG: 50 INJECTION, SOLUTION INTRAMUSCULAR; INTRAVENOUS at 13:41

## 2019-01-11 RX ADMIN — DEXAMETHASONE SODIUM PHOSPHATE 4 MG: 4 INJECTION, SOLUTION INTRAMUSCULAR; INTRAVENOUS at 12:38

## 2019-01-11 RX ADMIN — OXYCODONE HYDROCHLORIDE 5 MG: 5 TABLET ORAL at 14:26

## 2019-01-11 RX ADMIN — PROPOFOL 200 MG: 10 INJECTION, EMULSION INTRAVENOUS at 12:35

## 2019-01-11 RX ADMIN — PROPOFOL 100 MG: 10 INJECTION, EMULSION INTRAVENOUS at 12:38

## 2019-01-11 RX ADMIN — SODIUM CHLORIDE, SODIUM LACTATE, POTASSIUM CHLORIDE, AND CALCIUM CHLORIDE: .6; .31; .03; .02 INJECTION, SOLUTION INTRAVENOUS at 11:55

## 2019-01-11 RX ADMIN — FENTANYL CITRATE 25 MCG: 50 INJECTION, SOLUTION INTRAMUSCULAR; INTRAVENOUS at 12:38

## 2019-01-11 NOTE — DISCHARGE INSTRUCTIONS
Ureteral Stent Placement   WHAT YOU NEED TO KNOW:   Ureteral stent placement is a procedure to open a blocked or narrow ureter  The ureter is the tube that carries urine from your kidney into your bladder  A stent is a thin hollow plastic tube used to hold your ureter open and allow urine to flow  The stent may stay in for several weeks  DISCHARGE INSTRUCTIONS:   Medicines:   · Pain medicine  may be given to take away or decrease pain  Do not wait until the pain is severe before you take your medicine  · Antibiotics  help prevent infections  Your healthcare provider may prescribe these for you while your stent remains in  · Take your medicine as directed  Contact your healthcare provider if you think your medicine is not helping or if you have side effects  Tell him or her if you are allergic to any medicine  Keep a list of the medicines, vitamins, and herbs you take  Include the amounts, and when and why you take them  Bring the list or the pill bottles to follow-up visits  Carry your medicine list with you in case of an emergency  Follow up with your urologist as directed: You will need regular follow-up visits with your urologist as long as the stent remains in  He will check to make sure the stent is working properly  He may do urine cultures to check for infection  Write down your questions so you remember to ask them during your visits  Self-care:   · Drink liquids  as directed  Ask your healthcare provider how much liquid to drink each day and which liquids are best for you  Fluids such as cranberry or apple juice may be especially helpful to prevent urinary infections  · Return to normal activities  the day after your stent placement or as directed by your healthcare provider  · You may take a shower  the day after your stent placement if your healthcare provider says it is okay  Contact your healthcare provider or urologist if:   · You have a fever or chills      · You feel like you need to urinate often  · You have pain when you urinate or pain around your bladder or kidney  · You see blood in your urine or it looks cloudy  · You have questions or concerns about your condition or care  Seek care immediately or call 911 if:   · You urinate little or not at all  · You have severe pain in your abdomen  © 2017 [employer] Information is for End User's use only and may not be sold, redistributed or otherwise used for commercial purposes  All illustrations and images included in CareNotes® are the copyrighted property of [employer]  or Milton [employer]  The above information is an  only  It is not intended as medical advice for individual conditions or treatments  Talk to your doctor, nurse or pharmacist before following any medical regimen to see if it is safe and effective for you  You will be scheduled for removal of your stent in the office early next week    WHAT IS A STENT? At the end of the procedure, your doctor may place a stent into your ureter  A stent is a thin, flexible piece of plastic that will hold open your ureter while the remaining small pieces of stone pass  This allows your kidney to drain easily and prevents you from having to pass these small stone pieces on your own, which could be painful  The stent is about 12 inches long and looks and feels like a thin piece of spaghetti  AFTER THE PROCEDURE  After the procedure you may experience the following symptoms  All of these are normal and should resolve within 1 or 2 days after your stent is removed    1) Urinary frequency (urinating more often than usual)  2) Urinary urgency (the sensation that you need to urinate right away)  3) Painful urination (this can be pain in your bladder or in your back when  you urinate)  4) Blood in your urine ( a stent can irritate the lining of your bladder causing it to bleed)  5) Back/Flank pain, especially with urination  You will receive a prescription for narcotic pain medication after the procedure  You will also receive a prescription for tamsulosin which you will take once a day for 2 weeks to help relax your ureter and decrease stent discomfort  You will also need to purchase a stool softener (i e  Colace) or mild laxative (i e  Miralax) as the narcotic pain medication can make you constipated  This is important as constipation can exacerbate stent related symptoms  STENT REMOVAL  In some cases, your doctor will leave strings attached to your stent  The strings will be taped to your skin after the procedure  The strings will allow you to remove the thin flexible stent while you are at home  Normally, the stent can be removed 3-5 days after your procedure; your physician will tell you the specific date after your procedure  On the day you are supposed to remove your stent, do the followin) When you wake up in the morning, take 1-2 pain pills with food  Start your antibiotic pill the morning of schedule stent removal if prescribed  2) One hour later sit on the toilet or in the bath tub  3) Take a deep breath in and while exhaling, pull the string  4)  Dispose of the stent in the garbage  5)   Alternatively, you will come back for an office procedure to remove the stent by placing a small camera into your bladder to remove the stent  During the next 4-8 hours after removing your stent, you may experience additional blood in your urine, pain with urination or back/side pain  You should take the pain medication you were prescribed to help you with the pain, as well as continue the Flomax  If the pain is severe, you are vomiting, and/or have a fever > 101 4 please call the clinic

## 2019-01-11 NOTE — ANESTHESIA PREPROCEDURE EVALUATION
Review of Systems/Medical History          Cardiovascular  Hyperlipidemia,    Pulmonary       GI/Hepatic    GERD ,        Kidney stones,        Endo/Other  History of thyroid disease ,      GYN       Hematology   Musculoskeletal       Neurology    Headaches,    Psychology           Physical Exam    Airway    Mallampati score: II         Dental   No notable dental hx     Cardiovascular      Pulmonary      Other Findings        Anesthesia Plan  ASA Score- 2     Anesthesia Type- general with ASA Monitors  Additional Monitors:   Airway Plan: LMA  Plan Factors-    Induction- intravenous  Postoperative Plan-     Informed Consent- Anesthetic plan and risks discussed with patient  I personally reviewed this patient with the CRNA  Discussed and agreed on the Anesthesia Plan with the CRNA  Ryan Miranda

## 2019-01-11 NOTE — TELEPHONE ENCOUNTER
Called and spoke to patient, scheduled for nurse visit stent with string removal on Tuesday 1/15/19 at 9:30 Am Willis Vanessa

## 2019-01-11 NOTE — OP NOTE
Operative Note     PATIENT:  Govind Hooper (MRN [id number])    DATE OF PROCEDURE:   1/11/2019    PRE-OP DIAGNOSIS:   1) Left renal calculus    POST-OP DIAGNOSIS:   1) Left renal calculus    PROCEDURES PERFORMED:  1) Cystoscopy  2) Left retrograde pyelography with fluoroscopic interpretation  3) Left ureteroscopy with laser lithotripsy of stone  4) Left ureteral stent placement     SURGEON:  Wayne Lagunas MD    NOTE:  There were no qualified teaching residents to assist with this case    ANESTHESIA: General     COMPLICATIONS:   None    ANTIBIOTICS:  Cefazolin    INTRAOPERATIVE THROMBOEMBOLISM PROPHYLAXIS:  Pneumatic compression stockings     FINDINGS:  Left lower pole intrarenal calculus completely fragmented, postoperative stent left in place on a string  INDICATIONS FOR PROCEDURE:  Govind Hooper is an 50 y o  old female with Left renal calculus  After discussing the options, the patient elected to undergo ureteroscopy and ureteral stent placement  We discussed the procedure in detail, the alternatives, and the risks, and they signed informed consent to proceed  PROCEDURE IN DETAIL:   The patient was identified and brought to the OR  Antibiotic prophylaxis and DVT prophylaxis were administered  They were placed in the comfortable dorsal lithotomy position with care to pad all pressure points  They were prepped and draped in the usual sterile fashion using hibiclens  A surgical time out was performed with all in the room in agreement with the correct patient, procedure, indications, and laterality  A 21-Swedish rigid cystoscope was used to enter the bladder  The bladder was inspected in its entirety and there were no lesions noted  The ureteral orifices were identified in their normal orthotopic positions  The Left ureteral orifice was identified and a 5 Fr open ended catheter was placed into the ureteral orifice  A retrograde pyelogram was performed with the findings as described above    A Sensor wire was advanced up to the kidney under fluoroscopic guidance  Leaving this safety wire in place, the bladder was drained  A second working wire was then placed, and over this a 12/14 ureteral access sheath was sequentially passed under fluoroscopic guidance  A  8 5 Frisian digital flexible ureteroscope was advanced up the ureter under vision   The stone was encountered in the Lower pole calculus  The stone was not noted to be impacted  A holmium laser fiber was passed through the ureteroscope and laser lithotripsy was commenced at settings of 0 7 J and 7 Hz  The stones were fragmented to very small pieces  Once we were satisfied that the stone was fragmented to dust, a 1 9 [employer] zero tip nitinol basket was passed through the ureteroscope  The residual fragments were basketed out and removed  The ureteroscope was backed down the ureter under vision and there were no residual fragments and the ureter was noted to be intact with no injury and no edema where the stone was located  A JJ stent was then passed up the wire  under fluoroscopic guidance into the kidney with a good curl noted in the kidney and in the bladder  An externalized tethering string was left in place and secured to the skin  The bladder was drained  The patient was placed back supine, awakened from general anesthesia and brought to recovery room in stable condition  ESTIMATED BLOOD LOSS:  Minimal      SPECIMENS:   * No orders in the log *     IMPLANTS:     Implant Name Type Inv  Item Serial No   Lot No  LRB No  Used   URETERAL STENT 6 FR X 26 CM OPTIMA INLAY - [id number]   URETERAL STENT 6 FR X 26 CM OPTIMA INLAY   [employer] [id number] Left 1        COMPLICATIONS: None    DISPOSITION: PACU    PLAN:  Patient will be scheduled for ureteral stent removal in the office early next week    She will then follow up in 2 months with a postoperative KUB and renal ultrasound and will continue her workup with Nephrology for metabolic management of her recurrent stone disease

## 2019-01-11 NOTE — ANESTHESIA POSTPROCEDURE EVALUATION
Post-Op Assessment Note      CV Status:  Stable    Mental Status:  Somnolent and unresponsive    Hydration Status:  Stable    PONV Controlled:  None    Post Op Vitals Reviewed: Yes          Staff: CRNA           BP   133/77   Temp      Pulse  78   Resp   12   SpO2   100

## 2019-01-11 NOTE — TELEPHONE ENCOUNTER
[facility] patient, managed by Dr Tran Freeman, s/p Left URS/removal of stone, left ureteral stent with string placement on Friday 1/11/19  Per Dr Tran Freeman, patient to have stent with string removed early next week, then follow up in 2 months with KUB/US prior to visit

## 2019-01-11 NOTE — H&P (VIEW-ONLY)
Referring Physician: Rajani Panchal DO  A copy of this consultation note was communicated to the referring physician  Diagnoses and all orders for this visit:    Kidney stone  -     POCT urine dip  -     Stone analysis  -     Ambulatory referral to Nephrology; Future  -     Case request operating room: CYSTOSCOPY URETEROSCOPY WITH LITHOTRIPSY HOLMIUM LASER, RETROGRADE PYELOGRAM AND INSERTION STENT URETERAL; Standing  -     Case request operating room: CYSTOSCOPY URETEROSCOPY WITH LITHOTRIPSY HOLMIUM LASER, RETROGRADE PYELOGRAM AND INSERTION STENT URETERAL    Other orders  -     Diet NPO; Sips with meds; Standing  -     Place sequential compression device; Standing  -     ciprofloxacin (CIPRO) IVPB (premix) 400 mg; Infuse 200 mL (400 mg total) into a venous catheter once             Assessment and plan:     Robert Carrasco is a 50 y o  who initially presented with a 3 mm obstructing left ureteral calculus and presents in follow-up after trial of passage with medical expulsive therapy  Repeat ultrasound does demonstrate persistent mild left hydronephrosis  She also has a larger intrarenal calculus on that left side  She did successfully passed her distal stone and brings it in hand today  It will be sent out for analysis    We therefore discussed management options for her intrarenal stone burden from which he is presently asymptomatic  Discussed options for management of the patient's ureteral stone  We discussed surgical options including ureteroscopy and shock wave lithotripsy  In addition we discussed conservative management with medical expulsive therapy  The stone does not appear to be radiopaque on the  of her CT scan  For that reason I do not believe he would be a good candidate for shockwave lithotripsy            The patient has elected to undergo ureteroscopy  I discussed with the patients risks and benefits and alternatives to ureteroscopy with laser lithotripsy    The risks include bleeding, infection, injury to the urethra, bladder, ureter or kidney, risk of a staged procedure, risk of stricture, risk of residual fragments, risk of loss of kidney, risks of anesthesia including DVT, PE, MI and death  The patient understands that a ureteral stent will likely be left in place at the time of the procedure  We reviewed the expected postoperative care  The patient understands these risks and has provided informed consent for  LEFT ureteroscopy  Jus Li MD      Chief Complaint     Nephrolithiasis      History of Present Illness     Raymundo Olmos is a 50 y o    female returns in follow-up for nephrolithiasis  Patient recently presented with flank pain and was found to have a 3 mm left distal ureteral calculus as well as a 8 mm intrarenal calculus and a contralateral 3 mm intrarenal calculus  Patient was initiated on medical expulsive therapy and passed her stone without much discomfort 48 hr later  She brings it in hand today  At the present time she remains asymptomatic  Detailed Urologic History     - please refer to HPI    Review of Systems       Review of Systems   Constitutional: Negative for activity change and fatigue  HENT: Negative for congestion  Eyes: Negative for visual disturbance  Respiratory: Negative for shortness of breath and wheezing  Cardiovascular: Negative for chest pain and leg swelling  Gastrointestinal: Negative for abdominal pain  Endocrine: Negative for polyuria  Genitourinary: Positive for flank pain  Negative for dysuria, hematuria and urgency  Musculoskeletal: Negative for back pain  Allergic/Immunologic: Negative for immunocompromised state  Neurological: Negative for dizziness and numbness  Psychiatric/Behavioral: Negative for dysphoric mood  All other systems reviewed and are negative            Allergies     Allergies   Allergen Reactions    Biaxin [Clarithromycin] Diarrhea    Cefzil [Cefprozil] Dizziness    Flagyl [Metronidazole] Nausea Only    Percocet [Oxycodone-Acetaminophen] Nausea Only    Latex Rash       Physical Exam       Physical Exam   Constitutional: Oriented to person, place, and time  Appears well-developed and well-nourished  No distress  HENT:   Head: Normocephalic and atraumatic  Eyes: EOM are normal    Neck: Normal range of motion  Cardiovascular: Normal rate  Pulmonary/Chest: Effort normal and breath sounds normal    Abdominal: Soft  Genitourinary:   Genitourinary Comments: Mild CVA tenderness   Musculoskeletal: Normal range of motion  Neurological: Aalert and oriented to person, place, and time  Skin: Skin is warm  Psychiatric: Nnormal mood and affect  Behavior is normal        Vital Signs  There were no vitals filed for this visit        Current Medications       Current Outpatient Prescriptions:     FLUoxetine (PROzac) 40 MG capsule, Take 1 capsule (40 mg total) by mouth daily, Disp: 30 capsule, Rfl: 1    loratadine (CLARITIN) 10 mg tablet, Take 10 mg by mouth daily, Disp: , Rfl:     LORazepam (ATIVAN) 0 5 mg tablet, Take 1 tablet (0 5 mg total) by mouth daily as needed for anxiety, Disp: 15 tablet, Rfl: 0    ondansetron (ZOFRAN) 4 mg tablet, Take 1 tablet (4 mg total) by mouth every 8 (eight) hours as needed for nausea or vomiting (Patient not taking: Reported on 12/18/2018 ), Disp: 15 tablet, Rfl: 0    ranitidine (ZANTAC) 150 mg tablet, Take 150 mg by mouth daily  , Disp: , Rfl:     rizatriptan (MAXALT) 10 MG tablet, Take 1 tablet (10 mg total) by mouth once as needed for migraine for up to 1 dose May repeat in 2 hours if needed, Disp: 9 tablet, Rfl: 0    SYNTHROID 75 MCG tablet, Take 75 mcg by mouth daily, Disp: , Rfl:     tamsulosin (FLOMAX) 0 4 mg, Take 1 capsule (0 4 mg total) by mouth daily with dinner, Disp: 14 capsule, Rfl: 0      Active Problems     Patient Active Problem List   Diagnosis    Influenza    Allergic rhinitis    Anxiety disorder    Early menopause    Esophageal reflux    Hyperlipidemia    Rhus dermatitis    Migraines    Lower back pain    Lactose intolerance    Hypothyroidism due to acquired atrophy of thyroid    Sprain of ulnar collateral ligament of left elbow    Acute cystitis with hematuria         Past Medical History     Past Medical History:   Diagnosis Date    Anxiety     Chronic otitis externa     Last assessed 12/18/2017    Disease of thyroid gland     GERD (gastroesophageal reflux disease)     Headache     Hyperlipidemia          Surgical History     Past Surgical History:   Procedure Laterality Date    DILATION AND CURETTAGE OF UTERUS      EAR SURGERY      age 6    IA ESOPHAGOGASTRODUODENOSCOPY TRANSORAL DIAGNOSTIC N/A 12/27/2017    Procedure: ESOPHAGOGASTRODUODENOSCOPY (EGD); Surgeon: Kiesha Perez MD;  [facility]; Service: Gastroenterology    TUBAL LIGATION      VEIN LIGATION AND STRIPPING Bilateral     WISDOM TOOTH EXTRACTION           Family History     Family History   Problem Relation Age of Onset    Hypertension Mother     Hypertension Father     Hyperlipidemia Father          Social History     Social History     History   Smoking Status    Never Smoker   Smokeless Tobacco    Never Used         Pertinent Lab Values     Lab Results   Component Value Date    CREATININE 0 87 12/15/2018       No results found for: PSA    @RESULTRCNT(1H])@      Pertinent Imaging      - n/a      Portions of the record may have been created with voice recognition software   Occasional wrong word or "sound a like" substitutions may have occurred due to the inherent limitations of voice recognition software   Read the chart carefully and recognize, using context, where substitutions have occurred

## 2019-01-15 ENCOUNTER — PROCEDURE VISIT (OUTPATIENT)
Dept: UROLOGY | Facility: CLINIC | Age: 49
End: 2019-01-15
Payer: COMMERCIAL

## 2019-01-15 VITALS
DIASTOLIC BLOOD PRESSURE: 78 MMHG | HEART RATE: 88 BPM | SYSTOLIC BLOOD PRESSURE: 138 MMHG | WEIGHT: 154 LBS | BODY MASS INDEX: 22.05 KG/M2 | HEIGHT: 70 IN

## 2019-01-15 DIAGNOSIS — N20.0 KIDNEY STONE: Primary | ICD-10-CM

## 2019-01-15 PROCEDURE — 99211 OFF/OP EST MAY X REQ PHY/QHP: CPT

## 2019-01-15 NOTE — PATIENT INSTRUCTIONS
Increase fluids, medicate for pain with Ibuprofen 600 mg every 6 to 8 hours as needed, heating pad as needed  Colace as needed for bowel management  Call if develop fever, chills, nausea or vomiting  Follow up in 2 months with xray and ultrasound prior to visit  OK to return to work on Tuesday 1/22/19

## 2019-01-15 NOTE — PROGRESS NOTES
Juan Kruse  [id number]  1970      1/15/2019      Diagnosis  Chief Complaint     stent with string removal          Patient is s/p left ureteroscopy with stone extraction on 1/11/19 with Dr Hema Merrill  Plan  Follow up in 2 months with KUB and US prior to visit  Follow up with nephrology as scheduled  Procedure Stent with String Removal    Vitals:    01/15/19 0922   BP: 138/78   Pulse: 88   Weight: 69 9 kg (154 lb)   Height: 5' 10" (1 778 m)       Stent with string removed without difficulty  Reviewed post stent removal symptoms including flank pain, nausea, dysuria, and hematuria  Instructed to increase PO fluids  Take NSAIDS and other prescribed pain medication PRN  Encouraged to call with for uncontrolled pain and fever        Maryellen Brewster RN

## 2019-01-21 ENCOUNTER — HOSPITAL ENCOUNTER (OUTPATIENT)
Dept: MAMMOGRAPHY | Facility: HOSPITAL | Age: 49
Discharge: HOME | End: 2019-01-21
Attending: FAMILY MEDICINE
Payer: COMMERCIAL

## 2019-01-21 VITALS — HEIGHT: 70 IN | BODY MASS INDEX: 22.05 KG/M2 | WEIGHT: 154 LBS

## 2019-01-21 DIAGNOSIS — Z12.39 BREAST CANCER SCREENING: ICD-10-CM

## 2019-01-21 DIAGNOSIS — N20.0 NEPHROLITHIASIS: Primary | ICD-10-CM

## 2019-01-21 DIAGNOSIS — F41.1 GENERALIZED ANXIETY DISORDER: ICD-10-CM

## 2019-01-21 PROCEDURE — 77063 BREAST TOMOSYNTHESIS BI: CPT

## 2019-01-21 PROCEDURE — 77067 SCR MAMMO BI INCL CAD: CPT

## 2019-01-21 RX ORDER — FLUOXETINE HYDROCHLORIDE 40 MG/1
CAPSULE ORAL
Qty: 30 CAPSULE | Refills: 0 | OUTPATIENT
Start: 2019-01-21

## 2019-01-22 DIAGNOSIS — N20.0 NEPHROLITHIASIS: Primary | ICD-10-CM

## 2019-01-22 RX ORDER — POTASSIUM CITRATE 10 MEQ/1
10 TABLET, EXTENDED RELEASE ORAL 2 TIMES DAILY
Qty: 60 TABLET | Refills: 5 | Status: SHIPPED | OUTPATIENT
Start: 2019-01-22 | End: 2019-05-07 | Stop reason: ALTCHOICE

## 2019-01-22 NOTE — TELEPHONE ENCOUNTER
Spoke with the patient and she is aware of her 24 hour stone risk profile results and to start potassium citrate 10 meq's BID and to have a follow up in 3 months with a repeat stone risk profile  The patient has been placed on the recall list and the stone risk profile slip has been mailed out

## 2019-01-22 NOTE — TELEPHONE ENCOUNTER
----- Message from Shivam Coker MD sent at 1/21/2019  4:42 PM EST -----  Potassium citrate 10 mEq  BID  ----- Message -----  From: Chasity Greenfield  Sent: 1/21/2019   4:30 PM  To: Shivam Coker MD    What does of potassium citrate? Please let me know so I can send into pharmacy for the patient    ----- Message -----  From: Shivam Coker MD  Sent: 1/21/2019   3:57 PM  To: Chasity Greenfield    Call and tell patient that on urine collection the citrate was low the calcium and oxalate were normal   So it appears that this citrate being a low could be a risk factor for stone development so start her on potassium citrate 1 tablet twice a day  Then tell her that after on this for a couple months to repeat the urine collections so mail her the 24 hour urine stone risk profile and then make her an appointment to see me in 3 months  So she can time the urine collection for before she comes to see me in 3 months

## 2019-01-30 ENCOUNTER — OFFICE VISIT (OUTPATIENT)
Dept: FAMILY MEDICINE CLINIC | Facility: OTHER | Age: 49
End: 2019-01-30
Payer: COMMERCIAL

## 2019-01-30 VITALS
SYSTOLIC BLOOD PRESSURE: 110 MMHG | DIASTOLIC BLOOD PRESSURE: 84 MMHG | BODY MASS INDEX: 22.05 KG/M2 | HEIGHT: 70 IN | HEART RATE: 101 BPM | TEMPERATURE: 98.2 F | OXYGEN SATURATION: 99 % | WEIGHT: 154 LBS

## 2019-01-30 DIAGNOSIS — J01.00 ACUTE NON-RECURRENT MAXILLARY SINUSITIS: Primary | ICD-10-CM

## 2019-01-30 PROCEDURE — 99214 OFFICE O/P EST MOD 30 MIN: CPT | Performed by: FAMILY MEDICINE

## 2019-01-30 RX ORDER — AMOXICILLIN AND CLAVULANATE POTASSIUM 875; 125 MG/1; MG/1
1 TABLET, FILM COATED ORAL EVERY 12 HOURS SCHEDULED
Qty: 20 TABLET | Refills: 0 | Status: SHIPPED | OUTPATIENT
Start: 2019-01-30 | End: 2019-02-09

## 2019-01-30 NOTE — PROGRESS NOTES
Subjective:      Patient ID: Karon Michelle is a 50 y o  female  Chief Complaint   Patient presents with    Cold Like Symptoms     cough, nasal congestion, jaw/face pain and headache x one week        URI    This is a recurrent problem  The current episode started 1 to 4 weeks ago (Off/on since early January 2019)  The problem has been waxing and waning  There has been no fever  Associated symptoms include congestion, coughing, headaches, neck pain, a plugged ear sensation, rhinorrhea and sinus pain  Pertinent negatives include no abdominal pain, chest pain, diarrhea, dysuria, ear pain, joint pain, joint swelling, nausea, rash, sneezing, sore throat, swollen glands, vomiting or wheezing  She has tried sleep, NSAIDs, increased fluids and antihistamine for the symptoms  The treatment provided mild relief         The following portions of the patient's history were reviewed and updated as appropriate: allergies, current medications, past family history, past medical history, past social history, past surgical history and problem list       Current Outpatient Prescriptions:     docusate sodium (COLACE) 100 mg capsule, Take 1 capsule (100 mg total) by mouth 2 (two) times a day for 15 days, Disp: 30 capsule, Rfl: 0    FLUoxetine (PROzac) 40 MG capsule, Take 1 capsule (40 mg total) by mouth daily (Patient taking differently: Take 40 mg by mouth daily at bedtime  ), Disp: 30 capsule, Rfl: 1    ibuprofen (MOTRIN) 200 mg tablet, Take 3 tablets (600 mg total) by mouth every 6 (six) hours as needed for mild pain, Disp: , Rfl: 0    loratadine (CLARITIN) 10 mg tablet, Take 10 mg by mouth daily in the early morning  , Disp: , Rfl:     LORazepam (ATIVAN) 0 5 mg tablet, Take 1 tablet (0 5 mg total) by mouth daily as needed for anxiety, Disp: 15 tablet, Rfl: 0    potassium citrate (UROCIT-K) 10 mEq, Take 1 tablet (10 mEq total) by mouth 2 (two) times a day, Disp: 60 tablet, Rfl: 5    ranitidine (ZANTAC) 150 mg tablet, Take 150 mg by mouth daily in the early morning  , Disp: , Rfl:     rizatriptan (MAXALT) 10 MG tablet, Take 1 tablet (10 mg total) by mouth once as needed for migraine for up to 1 dose May repeat in 2 hours if needed, Disp: 9 tablet, Rfl: 0    SYNTHROID 75 MCG tablet, Take 75 mcg by mouth daily in the early morning  , Disp: , Rfl:     amoxicillin-clavulanate (AUGMENTIN) 875-125 mg per tablet, Take 1 tablet by mouth every 12 (twelve) hours for 10 days Take with food, Disp: 20 tablet, Rfl: 0      Review of Systems   Constitutional: Positive for chills and fatigue  Negative for appetite change and fever  HENT: Positive for congestion, postnasal drip, rhinorrhea, sinus pain and sinus pressure  Negative for drooling, ear pain, hearing loss, sneezing, sore throat and tinnitus  Eyes: Negative for pain, discharge, redness and itching  Respiratory: Positive for cough  Negative for chest tightness, shortness of breath and wheezing  Cardiovascular: Negative for chest pain, palpitations and leg swelling  Gastrointestinal: Negative for abdominal pain, diarrhea, nausea and vomiting  Genitourinary: Negative for dysuria and flank pain  Musculoskeletal: Positive for neck pain and neck stiffness  Negative for arthralgias, back pain, joint pain and myalgias  Skin: Negative for rash  Neurological: Positive for headaches  Negative for dizziness  Hematological: Negative for adenopathy  Psychiatric/Behavioral: Negative for confusion and dysphoric mood  The patient is not nervous/anxious  Objective:      /84 (BP Location: Left arm, Patient Position: Sitting, Cuff Size: Adult)   Pulse 101   Temp 98 2 °F (36 8 °C) (Tympanic)   Ht 5' 10" (1 778 m)   Wt 69 9 kg (154 lb)   SpO2 99%   BMI 22 10 kg/m²          Physical Exam   Constitutional: She is oriented to person, place, and time  She appears well-developed and well-nourished  No distress  HENT:   Head: Normocephalic and atraumatic     Right Ear: Hearing and external ear normal  Tympanic membrane is retracted  Left Ear: Hearing and external ear normal  Tympanic membrane is retracted  Nose: Mucosal edema and rhinorrhea present  Right sinus exhibits maxillary sinus tenderness  Right sinus exhibits no frontal sinus tenderness  Left sinus exhibits maxillary sinus tenderness  Left sinus exhibits no frontal sinus tenderness  Mouth/Throat: Uvula is midline and mucous membranes are normal  Posterior oropharyngeal erythema present  No oropharyngeal exudate  Eyes: Pupils are equal, round, and reactive to light  Conjunctivae and EOM are normal  Right eye exhibits no discharge  Left eye exhibits no discharge  No scleral icterus  Neck: Normal range of motion  Neck supple  No thyromegaly present  Cardiovascular: Normal rate, regular rhythm and normal heart sounds  No murmur heard  Pulmonary/Chest: Effort normal and breath sounds normal  No respiratory distress  She has no wheezes  Abdominal: Soft  Bowel sounds are normal  She exhibits no distension  There is no tenderness  Musculoskeletal: Normal range of motion  She exhibits no edema, tenderness or deformity  Lymphadenopathy:     She has cervical adenopathy  Right cervical: Superficial cervical adenopathy present  No deep cervical and no posterior cervical adenopathy present  Left cervical: Superficial cervical adenopathy present  No deep cervical and no posterior cervical adenopathy present  Neurological: She is alert and oriented to person, place, and time  She has normal reflexes  No cranial nerve deficit  Coordination normal    Skin: Skin is warm and dry  No rash noted  No erythema  Psychiatric: She has a normal mood and affect  Her behavior is normal    Vitals reviewed  Assessment/Plan:  Diagnoses and all orders for this visit:    Acute non-recurrent maxillary sinusitis  -     amoxicillin-clavulanate (AUGMENTIN) 875-125 mg per tablet;  Take 1 tablet by mouth every 12 (twelve) hours for 10 days Take with food          80-year-old female presents with acute maxillary sinusitis  Will empirically treat with 10 day course of oral Augmentin as patient has been suffering with waxing/waning symptoms for the last 3-4 weeks  Recommend supportive care with saltwater gargles and saline nasal spray  Okay to try over-the-counter antihistamines, cough syrups, and nasal steroid sprays for added symptom relief  Educational materials provided on the treatment of sinusitis  Patient to call if antibiotic does not begin to improve her symptoms in 2-3 days  Return if symptoms worsen or fail to improve  The patient indicates understanding of these issues and agrees with the plan            Emy Prasad,

## 2019-01-30 NOTE — LETTER
January 30, 2019     Patient: Bandar Modi   YOB: 1970   Date of Visit: 1/30/2019       To Whom it May Concern:    Nicole Nicole is under my professional care  She was seen in my office on 1/30/2019  She may return to work on 1/31/19 or when fever-free for 24 hrs  If you have any questions or concerns, please don't hesitate to call           Sincerely,          Deloris Lopez DO        CC: No Recipients

## 2019-01-30 NOTE — PATIENT INSTRUCTIONS
Rhinosinusitis   AMBULATORY CARE:   Rhinosinusitis (RS)  is inflammation of your nose and sinuses  It commonly begins as a virus, often as a common cold  Viruses usually last 7 to 10 days and do not need treatment  When the virus does not get better on its own, you may have bacterial RS  This means that bacteria have begun to grow inside your sinuses  Acute RS lasts less than 4 weeks  Chronic RS lasts 12 weeks or more  Recurrent RS is when you have 4 or more episodes of RS in one year  Your signs and symptoms  may be worse when you lie on your back or try to sleep  You may have any of the following:  · Stuffy nose and reduced sense of smell     · Runny nose with thick yellow or green mucus     · Pressure or pain on your face or a headache     · Pain in your teeth or bad breath     · Ear pain or pressure     · Fever or cough     · Tiredness  Seek care immediately if:   · You have double vision or you cannot see  · You have a stiff neck, a fever, or a bad headache  · Your eyeball bulges out or you cannot move your eye  · Your eye and eyelid are red, swollen, and painful  · You cannot open your eye  · You are more sleepy than normal, or you notice changes in your ability to think, move, or talk  · You have swelling of your forehead or scalp  Contact your healthcare provider if:   · Your symptoms are worse or do not improve after 3 to 5 days of treatment  · You have questions or concerns about your condition or care  Treatment for rhinosinusitis  may include any of the following:  · Acetaminophen  decreases pain and fever  It is available without a doctor's order  Ask how much to take and how often to take it  Follow directions  Acetaminophen can cause liver damage if not taken correctly  · NSAIDs , such as ibuprofen, help decrease swelling, pain, and fever  This medicine is available with or without a doctor's order   NSAIDs can cause stomach bleeding or kidney problems in certain people  If you take blood thinner medicine, always ask your healthcare provider if NSAIDs are safe for you  Always read the medicine label and follow directions  · Nasal steroid sprays  decrease inflammation in your nose and sinuses  · Decongestants  reduce swelling and drain mucus in the nose and sinuses  They may help you breathe easier  · Antihistamines  dry mucus in the nose and relieve sneezing  · Antibiotics  treat a bacterial infection and may be needed if your symptoms do not improve or they get worse  · Take your medicine as directed  Contact your healthcare provider if you think your medicine is not helping or if you have side effects  Tell him or her if you are allergic to any medicine  Keep a list of the medicines, vitamins, and herbs you take  Include the amounts, and when and why you take them  Bring the list or the pill bottles to follow-up visits  Carry your medicine list with you in case of an emergency  Self-care:   · Rinse your sinuses  Use a sinus rinse device to rinse your nasal passages with a saline (salt water) solution  This will help thin the mucus in your nose and rinse away pollen and dirt  It will also help reduce swelling so you can breathe normally  Ask your healthcare provider how often to do this  · Breathe in steam   Heat a bowl of water until you see steam  Lean over the bowl and make a tent over your head with a large towel  Breathe deeply for about 20 minutes  Be careful not to get too close to the steam or burn yourself  Do this 3 times a day  You can also breathe deeply when you take a hot shower  · Sleep with your head elevated  Place an extra pillow under your head before you go to sleep to help your sinuses drain  · Drink liquids as directed  Ask your healthcare provider how much liquid to drink each day and which liquids are best for you  Liquids will thin the mucus in your nose and help it drain   Avoid drinks that contain alcohol or caffeine  · Do not smoke, and avoid secondhand smoke  Nicotine and other chemicals in cigarettes and cigars can make your symptoms worse  Ask your healthcare provider for information if you currently smoke and need help to quit  E-cigarettes or smokeless tobacco still contain nicotine  Talk to your healthcare provider before you use these products  Follow up with your healthcare provider as directed: Follow up if your symptoms are worse or not better after 3 to 5 days of treatment  Write down your questions so you remember to ask them during your visits  © 2017 [employer] Information is for End User's use only and may not be sold, redistributed or otherwise used for commercial purposes  All illustrations and images included in CareNotes® are the copyrighted property of [employer]  or [employer]  The above information is an  only  It is not intended as medical advice for individual conditions or treatments  Talk to your doctor, nurse or pharmacist before following any medical regimen to see if it is safe and effective for you

## 2019-02-08 DIAGNOSIS — F41.1 GENERALIZED ANXIETY DISORDER: ICD-10-CM

## 2019-02-09 RX ORDER — FLUOXETINE HYDROCHLORIDE 40 MG/1
40 CAPSULE ORAL DAILY
Qty: 90 CAPSULE | Refills: 1 | Status: SHIPPED | OUTPATIENT
Start: 2019-02-09 | End: 2019-10-04 | Stop reason: SDUPTHER

## 2019-02-25 ENCOUNTER — TELEPHONE (OUTPATIENT)
Dept: GASTROENTEROLOGY | Facility: AMBULARY SURGERY CENTER | Age: 49
End: 2019-02-25

## 2019-02-25 DIAGNOSIS — K21.9 GASTROESOPHAGEAL REFLUX DISEASE WITHOUT ESOPHAGITIS: Primary | ICD-10-CM

## 2019-02-25 RX ORDER — RANITIDINE 150 MG/1
150 TABLET ORAL 2 TIMES DAILY
Qty: 60 TABLET | Refills: 1 | Status: SHIPPED | OUTPATIENT
Start: 2019-02-25 | End: 2019-04-08 | Stop reason: SDUPTHER

## 2019-03-14 ENCOUNTER — OFFICE VISIT (OUTPATIENT)
Dept: FAMILY MEDICINE CLINIC | Facility: OTHER | Age: 49
End: 2019-03-14
Payer: COMMERCIAL

## 2019-03-14 VITALS
HEIGHT: 70 IN | HEART RATE: 94 BPM | SYSTOLIC BLOOD PRESSURE: 124 MMHG | WEIGHT: 155.9 LBS | TEMPERATURE: 98.3 F | BODY MASS INDEX: 22.32 KG/M2 | OXYGEN SATURATION: 98 % | DIASTOLIC BLOOD PRESSURE: 84 MMHG

## 2019-03-14 DIAGNOSIS — J01.00 ACUTE NON-RECURRENT MAXILLARY SINUSITIS: Primary | ICD-10-CM

## 2019-03-14 PROCEDURE — 1036F TOBACCO NON-USER: CPT | Performed by: FAMILY MEDICINE

## 2019-03-14 PROCEDURE — 99214 OFFICE O/P EST MOD 30 MIN: CPT | Performed by: FAMILY MEDICINE

## 2019-03-14 PROCEDURE — 3008F BODY MASS INDEX DOCD: CPT | Performed by: FAMILY MEDICINE

## 2019-03-14 RX ORDER — AMOXICILLIN AND CLAVULANATE POTASSIUM 875; 125 MG/1; MG/1
1 TABLET, FILM COATED ORAL EVERY 12 HOURS SCHEDULED
Qty: 20 TABLET | Refills: 0 | Status: SHIPPED | OUTPATIENT
Start: 2019-03-14 | End: 2019-03-24

## 2019-03-14 NOTE — PROGRESS NOTES
Subjective:      Patient ID: Carolin Emmanuel is a 50 y o  female  Sinusitis   This is a new problem  The current episode started in the past 7 days  The problem is unchanged  There has been no fever  The pain is moderate  Associated symptoms include congestion, coughing, headaches, a hoarse voice and sinus pressure  Pertinent negatives include no chills, diaphoresis, ear pain, neck pain, shortness of breath, sneezing, sore throat or swollen glands  Treatments tried: OTC allergy meds  The treatment provided mild relief         The following portions of the patient's history were reviewed and updated as appropriate: allergies, current medications, past family history, past medical history, past social history, past surgical history and problem list       Current Outpatient Medications:     FLUoxetine (PROzac) 40 MG capsule, Take 1 capsule (40 mg total) by mouth daily, Disp: 90 capsule, Rfl: 1    ibuprofen (MOTRIN) 200 mg tablet, Take 3 tablets (600 mg total) by mouth every 6 (six) hours as needed for mild pain, Disp: , Rfl: 0    loratadine (CLARITIN) 10 mg tablet, Take 10 mg by mouth daily in the early morning  , Disp: , Rfl:     LORazepam (ATIVAN) 0 5 mg tablet, Take 1 tablet (0 5 mg total) by mouth daily as needed for anxiety, Disp: 15 tablet, Rfl: 0    potassium citrate (UROCIT-K) 10 mEq, Take 1 tablet (10 mEq total) by mouth 2 (two) times a day, Disp: 60 tablet, Rfl: 5    ranitidine (ZANTAC) 150 mg tablet, Take 1 tablet (150 mg total) by mouth 2 (two) times a day, Disp: 60 tablet, Rfl: 1    rizatriptan (MAXALT) 10 MG tablet, Take 1 tablet (10 mg total) by mouth once as needed for migraine for up to 1 dose May repeat in 2 hours if needed, Disp: 9 tablet, Rfl: 0    SYNTHROID 75 MCG tablet, Take 75 mcg by mouth daily in the early morning  , Disp: , Rfl:     amoxicillin-clavulanate (AUGMENTIN) 875-125 mg per tablet, Take 1 tablet by mouth every 12 (twelve) hours for 10 days, Disp: 20 tablet, Rfl: 0   docusate sodium (COLACE) 100 mg capsule, Take 1 capsule (100 mg total) by mouth 2 (two) times a day for 15 days, Disp: 30 capsule, Rfl: 0      Review of Systems   Constitutional: Positive for fatigue  Negative for appetite change, chills, diaphoresis and fever  HENT: Positive for congestion, hoarse voice, postnasal drip, sinus pressure and sinus pain  Negative for drooling, ear pain, hearing loss, sneezing, sore throat and tinnitus  Eyes: Negative for pain, discharge, redness and itching  Respiratory: Positive for cough  Negative for chest tightness, shortness of breath and wheezing  Cardiovascular: Negative for chest pain, palpitations and leg swelling  Gastrointestinal: Negative for abdominal pain, diarrhea, nausea and vomiting  Genitourinary: Negative for dysuria and flank pain  Musculoskeletal: Negative for arthralgias, myalgias and neck pain  Skin: Negative for rash  Neurological: Positive for headaches  Negative for dizziness  Hematological: Negative for adenopathy  Psychiatric/Behavioral: Negative for confusion and dysphoric mood  The patient is not nervous/anxious  Objective:      /84 (BP Location: Left arm, Patient Position: Sitting, Cuff Size: Adult)   Pulse 94   Temp 98 3 °F (36 8 °C) (Tympanic)   Ht 5' 10" (1 778 m)   Wt 70 7 kg (155 lb 14 4 oz)   SpO2 98%   BMI 22 37 kg/m²          Physical Exam   Constitutional: She is oriented to person, place, and time  She appears well-developed and well-nourished  No distress  HENT:   Head: Normocephalic and atraumatic  Right Ear: External ear normal  Tympanic membrane is retracted  Left Ear: External ear normal  Tympanic membrane is retracted  Nose: Mucosal edema present  Right sinus exhibits maxillary sinus tenderness  Right sinus exhibits no frontal sinus tenderness  Left sinus exhibits maxillary sinus tenderness  Left sinus exhibits no frontal sinus tenderness  Mouth/Throat: Uvula is midline   Oropharyngeal exudate (PND/cobblestoning) present  No posterior oropharyngeal erythema  Eyes: Pupils are equal, round, and reactive to light  Conjunctivae and EOM are normal  Right eye exhibits no discharge  Left eye exhibits no discharge  No scleral icterus  Neck: Normal range of motion  Neck supple  No thyromegaly present  Cardiovascular: Normal rate, regular rhythm and normal heart sounds  No murmur heard  Pulmonary/Chest: Effort normal and breath sounds normal  No respiratory distress  She has no wheezes  Abdominal: Soft  Bowel sounds are normal  She exhibits no distension  There is no tenderness  Musculoskeletal: Normal range of motion  She exhibits no edema, tenderness or deformity  Lymphadenopathy:     She has no cervical adenopathy  Neurological: She is alert and oriented to person, place, and time  She has normal reflexes  No cranial nerve deficit  Coordination normal    Skin: Skin is warm and dry  No rash noted  No erythema  No pallor  Psychiatric: She has a normal mood and affect  Her behavior is normal    Vitals reviewed  Assessment/Plan:   Diagnoses and all orders for this visit:    Acute non-recurrent maxillary sinusitis  -     amoxicillin-clavulanate (AUGMENTIN) 875-125 mg per tablet; Take 1 tablet by mouth every 12 (twelve) hours for 10 days          59-year-old female presents with acute maxillary sinusitis  Will empirically treat with 10 day course of oral Augmentin  Recommend supportive care with saltwater gargles and saline nasal spray  Patient should call if symptoms do not begin to improve in the next 3-4 days  Return if symptoms worsen or fail to improve  The patient indicates understanding of these issues and agrees with the plan          Jey Monzon DO

## 2019-03-20 ENCOUNTER — HOSPITAL ENCOUNTER (OUTPATIENT)
Dept: ULTRASOUND IMAGING | Facility: HOSPITAL | Age: 49
Discharge: HOME | End: 2019-03-20
Attending: UROLOGY
Payer: COMMERCIAL

## 2019-03-20 DIAGNOSIS — N20.0 KIDNEY STONE: ICD-10-CM

## 2019-03-20 PROCEDURE — 76770 US EXAM ABDO BACK WALL COMP: CPT

## 2019-03-21 ENCOUNTER — OFFICE VISIT (OUTPATIENT)
Dept: FAMILY MEDICINE CLINIC | Facility: OTHER | Age: 49
End: 2019-03-21
Payer: COMMERCIAL

## 2019-03-21 VITALS
DIASTOLIC BLOOD PRESSURE: 84 MMHG | HEIGHT: 70 IN | HEART RATE: 108 BPM | OXYGEN SATURATION: 98 % | SYSTOLIC BLOOD PRESSURE: 118 MMHG | TEMPERATURE: 98.8 F | WEIGHT: 154.6 LBS | BODY MASS INDEX: 22.13 KG/M2

## 2019-03-21 DIAGNOSIS — A08.4 VIRAL GASTROENTERITIS: Primary | ICD-10-CM

## 2019-03-21 PROCEDURE — 99213 OFFICE O/P EST LOW 20 MIN: CPT | Performed by: FAMILY MEDICINE

## 2019-03-21 NOTE — PROGRESS NOTES
Subjective:      Patient ID: Samual Goodpasture is a 50 y o  female  Vomiting    This is a new problem  The current episode started today  The problem occurs 2 to 4 times per day  The problem has been unchanged  The emesis has an appearance of stomach contents  There has been no fever  Associated symptoms include abdominal pain (cramping), coughing (nonproductive, lingering from sinusitis) and diarrhea  Pertinent negatives include no arthralgias, chest pain, chills, dizziness, fever, headaches, myalgias, sweats, URI or weight loss  Risk factors include ill contacts  She has tried diet change and increased fluids for the symptoms  The treatment provided no relief  Diarrhea    This is a new problem  The current episode started today  The problem occurs 2 to 4 times per day  The problem has been unchanged  The stool consistency is described as watery  The patient states that diarrhea does not awaken her from sleep  Associated symptoms include abdominal pain (cramping), bloating, coughing (nonproductive, lingering from sinusitis) and vomiting  Pertinent negatives include no arthralgias, chills, fever, headaches, increased  flatus, myalgias, sweats, URI or weight loss  Nothing aggravates the symptoms  Risk factors include ill contacts  She has tried change of diet, electrolyte solution and increased fluids for the symptoms  The treatment provided mild relief  There is no history of bowel resection, inflammatory bowel disease, irritable bowel syndrome, malabsorption, a recent abdominal surgery or short gut syndrome         The following portions of the patient's history were reviewed and updated as appropriate: allergies, current medications, past family history, past medical history, past social history, past surgical history and problem list       Current Outpatient Medications:     FLUoxetine (PROzac) 40 MG capsule, Take 1 capsule (40 mg total) by mouth daily, Disp: 90 capsule, Rfl: 1    ibuprofen (MOTRIN) 200 mg tablet, Take 3 tablets (600 mg total) by mouth every 6 (six) hours as needed for mild pain, Disp: , Rfl: 0    loratadine (CLARITIN) 10 mg tablet, Take 10 mg by mouth daily in the early morning  , Disp: , Rfl:     LORazepam (ATIVAN) 0 5 mg tablet, Take 1 tablet (0 5 mg total) by mouth daily as needed for anxiety, Disp: 15 tablet, Rfl: 0    potassium citrate (UROCIT-K) 10 mEq, Take 1 tablet (10 mEq total) by mouth 2 (two) times a day, Disp: 60 tablet, Rfl: 5    ranitidine (ZANTAC) 150 mg tablet, Take 1 tablet (150 mg total) by mouth 2 (two) times a day, Disp: 60 tablet, Rfl: 1    rizatriptan (MAXALT) 10 MG tablet, Take 1 tablet (10 mg total) by mouth once as needed for migraine for up to 1 dose May repeat in 2 hours if needed, Disp: 9 tablet, Rfl: 0    SYNTHROID 75 MCG tablet, Take 75 mcg by mouth daily in the early morning  , Disp: , Rfl:     amoxicillin-clavulanate (AUGMENTIN) 875-125 mg per tablet, Take 1 tablet by mouth every 12 (twelve) hours for 10 days (Patient not taking: Reported on 3/21/2019), Disp: 20 tablet, Rfl: 0    docusate sodium (COLACE) 100 mg capsule, Take 1 capsule (100 mg total) by mouth 2 (two) times a day for 15 days, Disp: 30 capsule, Rfl: 0      Review of Systems   Constitutional: Positive for fatigue  Negative for activity change, chills, fever and weight loss  HENT: Negative for congestion, ear pain, sinus pain and sore throat  Eyes: Negative for pain and itching  Respiratory: Positive for cough (nonproductive, lingering from sinusitis)  Negative for shortness of breath  Cardiovascular: Negative for chest pain and palpitations  Gastrointestinal: Positive for abdominal pain (cramping), bloating, diarrhea, nausea and vomiting  Negative for constipation and flatus  Endocrine: Negative for cold intolerance and heat intolerance  Genitourinary: Negative for difficulty urinating, dysuria, frequency and urgency  Musculoskeletal: Negative for arthralgias and myalgias  Skin: Negative for color change and rash  Neurological: Negative for dizziness, syncope and headaches  Hematological: Negative for adenopathy  Psychiatric/Behavioral: Negative for behavioral problems, dysphoric mood and sleep disturbance  The patient is not nervous/anxious  Objective:      /84 (BP Location: Left arm, Patient Position: Sitting, Cuff Size: Adult)   Pulse (!) 108   Temp 98 8 °F (37 1 °C) (Tympanic)   Ht 5' 10" (1 778 m)   Wt 70 1 kg (154 lb 9 6 oz)   SpO2 98%   BMI 22 18 kg/m²          Physical Exam   Constitutional: She is oriented to person, place, and time  She appears well-developed and well-nourished  She appears ill  No distress  HENT:   Head: Normocephalic and atraumatic  Right Ear: External ear normal    Left Ear: External ear normal    Nose: Nose normal    Mouth/Throat: Oropharynx is clear and moist    Eyes: Pupils are equal, round, and reactive to light  Conjunctivae and EOM are normal  No scleral icterus  Neck: Normal range of motion  Neck supple  No thyromegaly present  Cardiovascular: Normal rate, regular rhythm and normal heart sounds  No murmur heard  Pulmonary/Chest: Effort normal and breath sounds normal  No respiratory distress  She has no wheezes  Abdominal: Soft  She exhibits no distension  Bowel sounds are increased  There is no hepatosplenomegaly  There is generalized tenderness (very mild)  There is no CVA tenderness  Musculoskeletal: Normal range of motion  She exhibits no edema, tenderness or deformity  Lymphadenopathy:     She has no cervical adenopathy  Neurological: She is alert and oriented to person, place, and time  No cranial nerve deficit  Coordination normal    Skin: Skin is warm and dry  No rash noted  No erythema  No pallor  Psychiatric: She has a normal mood and affect  Her behavior is normal    Vitals reviewed            Assessment/Plan:  Diagnoses and all orders for this visit:    Viral gastroenteritis 80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

## 2019-03-21 NOTE — LETTER
March 21, 2019     Patient: Mayra Allred   YOB: 1970   Date of Visit: 3/21/2019       To Whom it May Concern:    Tasneem Finnegan is under my professional care  She was seen in my office on 3/21/2019  She may return to work on 3/25/19  If you have any questions or concerns, please don't hesitate to call           Sincerely,          Madeleine Garcia DO        CC: No Recipients

## 2019-03-26 ENCOUNTER — OFFICE VISIT (OUTPATIENT)
Dept: UROLOGY | Facility: CLINIC | Age: 49
End: 2019-03-26
Payer: COMMERCIAL

## 2019-03-26 VITALS
HEART RATE: 68 BPM | SYSTOLIC BLOOD PRESSURE: 118 MMHG | BODY MASS INDEX: 22.07 KG/M2 | HEIGHT: 70 IN | DIASTOLIC BLOOD PRESSURE: 68 MMHG | WEIGHT: 154.2 LBS

## 2019-03-26 DIAGNOSIS — N20.0 KIDNEY STONE: Primary | ICD-10-CM

## 2019-03-26 PROCEDURE — 99213 OFFICE O/P EST LOW 20 MIN: CPT | Performed by: PHYSICIAN ASSISTANT

## 2019-03-26 NOTE — PROGRESS NOTES
1  Kidney stone  XR abdomen 1 view kub         Assessment and plan:       1  Nephrolithiasis - managed by Dr Ilia Lilly   - I was happy to review with the patient her most recent imaging was negative for evidence of renal calculi or hydronephrosis  - we discussed the importance of proper hydration and dietary modifications in order to minimize future stone formation  She will continue to follow with Nephrology  She is currently managed on potassium citrate  - patient will follow up in 1 year with a KUB prior to visit for continued surveillance of stone burden  She is aware to contact us in the interim with any signs and symptoms of passing stone  All questions answered  Atul Rodrigez PA-C      Chief Complaint     Chief Complaint   Patient presents with    Nephrolithiasis         History of Present Illness     Tayla Colon is a 50 y o  female patient of Dr Ilia Lilly with a history of nephrolithiasis status post ureteroscopy presenting for follow-up  Patient was having difficulties passing a 3 mm left distal ureteral calculus  She underwent cystoscopy, left ureteroscopy, laser lithotripsy, retrograde pyelography, left ureteral stent placement (01/11/2019)  There is a left lower pole intrarenal calculus that was completely fragmented  Postoperative stent placed  Patient's stone analysis was 90% calcium oxalate in origin  Patient underwent recent ultrasound the kidney and bladder (03/20/2018) which was negative for any obstructive uropathy  There is no intrarenal calculi detected  There is no hydronephrosis and bilateral ureteral jets were present  Stable minimally complex 11 mm right midpole cyst present  Patient has been referred to Nephrology and is undergoing metabolic evaluation for recurrent nephrolithiasis  She has been initiated on potassium citrate b i d     Patient has been doing well over the past few months    Denies any dysuria gross hematuria, flank pain, nausea, vomiting, fevers, or chills  Denies any interval passage of stones  Laboratory     Lab Results   Component Value Date    CREATININE 0 87 12/15/2018     Review of Systems     Review of Systems   Constitutional: Negative for activity change, appetite change, chills, diaphoresis, fatigue, fever and unexpected weight change  Respiratory: Negative for chest tightness and shortness of breath  Cardiovascular: Negative for chest pain, palpitations and leg swelling  Gastrointestinal: Negative for abdominal distention, abdominal pain, constipation, diarrhea, nausea and vomiting  Genitourinary: Negative for decreased urine volume, difficulty urinating, dysuria, enuresis, flank pain, frequency, genital sores, hematuria and urgency  Musculoskeletal: Negative for back pain, gait problem and myalgias  Skin: Negative for color change, pallor, rash and wound  Psychiatric/Behavioral: Negative for behavioral problems  The patient is not nervous/anxious  Allergies     Allergies   Allergen Reactions    Latex Rash     Where touch with Latex  Allergic to Kiwi and bananas    Biaxin [Clarithromycin] Diarrhea    Cefzil [Cefprozil] Dizziness    Flagyl [Metronidazole] Nausea Only    Percocet [Oxycodone-Acetaminophen] Nausea Only       Physical Exam     Physical Exam   Constitutional: She is oriented to person, place, and time  She appears well-developed and well-nourished  No distress  HENT:   Head: Normocephalic and atraumatic  Eyes: Conjunctivae are normal    Neck: Normal range of motion  No tracheal deviation present  Pulmonary/Chest: Effort normal    Musculoskeletal: Normal range of motion  She exhibits no edema or deformity  Neurological: She is alert and oriented to person, place, and time  Skin: Skin is warm and dry  She is not diaphoretic  No erythema  No pallor  Psychiatric: She has a normal mood and affect   Her behavior is normal          Vital Signs     Vitals:    03/26/19 1430   BP: 118/68   BP Location: Left arm   Patient Position: Sitting   Cuff Size: Adult   Pulse: 68   Weight: 69 9 kg (154 lb 3 2 oz)   Height: 5' 10" (1 778 m)         Current Medications       Current Outpatient Medications:     FLUoxetine (PROzac) 40 MG capsule, Take 1 capsule (40 mg total) by mouth daily, Disp: 90 capsule, Rfl: 1    ibuprofen (MOTRIN) 200 mg tablet, Take 3 tablets (600 mg total) by mouth every 6 (six) hours as needed for mild pain, Disp: , Rfl: 0    loratadine (CLARITIN) 10 mg tablet, Take 10 mg by mouth daily in the early morning  , Disp: , Rfl:     LORazepam (ATIVAN) 0 5 mg tablet, Take 1 tablet (0 5 mg total) by mouth daily as needed for anxiety, Disp: 15 tablet, Rfl: 0    potassium citrate (UROCIT-K) 10 mEq, Take 1 tablet (10 mEq total) by mouth 2 (two) times a day, Disp: 60 tablet, Rfl: 5    ranitidine (ZANTAC) 150 mg tablet, Take 1 tablet (150 mg total) by mouth 2 (two) times a day, Disp: 60 tablet, Rfl: 1    rizatriptan (MAXALT) 10 MG tablet, Take 1 tablet (10 mg total) by mouth once as needed for migraine for up to 1 dose May repeat in 2 hours if needed, Disp: 9 tablet, Rfl: 0    SYNTHROID 75 MCG tablet, Take 75 mcg by mouth daily in the early morning  , Disp: , Rfl:     docusate sodium (COLACE) 100 mg capsule, Take 1 capsule (100 mg total) by mouth 2 (two) times a day for 15 days, Disp: 30 capsule, Rfl: 0      Active Problems     Patient Active Problem List   Diagnosis    Influenza    Allergic rhinitis    Anxiety disorder    Early menopause    Esophageal reflux    Hyperlipidemia    Rhus dermatitis    Migraines    Lower back pain    Lactose intolerance    Hypothyroidism due to acquired atrophy of thyroid    Sprain of ulnar collateral ligament of left elbow    Acute cystitis with hematuria    Nephrolithiasis    Kidney stone         Past Medical History     Past Medical History:   Diagnosis Date    Anxiety     Chronic otitis externa     Last assessed 12/18/2017  Disease of thyroid gland     GERD (gastroesophageal reflux disease)     Hyperlipidemia     Kidney stone     Migraines          Surgical History     Past Surgical History:   Procedure Laterality Date    DILATION AND CURETTAGE OF UTERUS      EAR SURGERY      age 6    [facility] RETROGRADE PYELOGRAM  1/11/2019    CT CYSTO/URETERO W/LITHOTRIPSY &INDWELL STENT INSRT Left 1/11/2019    Procedure: CYSTOSCOPY URETEROSCOPY WITH LITHOTRIPSY HOLMIUM LASER, RETROGRADE PYELOGRAM AND INSERTION STENT URETERAL;  Surgeon: Justine Berry MD;  Location: [facility];  Service: Urology    CT ESOPHAGOGASTRODUODENOSCOPY TRANSORAL DIAGNOSTIC N/A 12/27/2017    Procedure: ESOPHAGOGASTRODUODENOSCOPY (EGD); Surgeon: Pedro Oswald MD;  Location: [facility]; Service: Gastroenterology    TUBAL LIGATION      VEIN LIGATION AND STRIPPING Bilateral     WISDOM TOOTH EXTRACTION         Family History     Family History   Problem Relation Age of Onset    Hypertension Mother     Hypertension Father     Hyperlipidemia Father        Social History     Social History       Radiology     RENAL ULTRASOUND     INDICATION:   N20 0: Calculus of kidney  Follow-up renal calculi      COMPARISON: Renal ultrasound December 26, 2018, CT abdomen pelvis December 15, 2018 and retrograde pyelogram January 11, 2019      TECHNIQUE:   Ultrasound of the retroperitoneum was performed with a curvilinear transducer utilizing volumetric sweeps and still imaging techniques       FINDINGS:     KIDNEYS:  Symmetric and normal size  Right kidney:  11 0 x 4 6 cm  Left kidney:  10 7 x 5 5 cm      Right kidney  Normal echogenicity and contour  No suspicious masses detected  Stable minimally complex 11 mm cyst midpole  No hydronephrosis  No shadowing calculi  No perinephric fluid collections      Left kidney  Normal echogenicity and contour  No suspicious masses detected  No hydronephrosis  No shadowing calculi    No perinephric fluid collections      URETERS:  Nonvisualized      BLADDER:   Incompletely distended  Circumferential wall thickening, likely due to under distention  No focal thickening or mass lesions  Bilateral ureteral jets detected         IMPRESSION:  No evidence of renal calculi or hydronephrosis

## 2019-04-04 ENCOUNTER — OFFICE VISIT (OUTPATIENT)
Dept: FAMILY MEDICINE CLINIC | Facility: OTHER | Age: 49
End: 2019-04-04
Payer: COMMERCIAL

## 2019-04-04 ENCOUNTER — TELEPHONE (OUTPATIENT)
Dept: NEPHROLOGY | Facility: CLINIC | Age: 49
End: 2019-04-04

## 2019-04-04 VITALS
HEART RATE: 83 BPM | SYSTOLIC BLOOD PRESSURE: 122 MMHG | WEIGHT: 156.56 LBS | DIASTOLIC BLOOD PRESSURE: 88 MMHG | BODY MASS INDEX: 22.41 KG/M2 | TEMPERATURE: 99.1 F | HEIGHT: 70 IN | OXYGEN SATURATION: 99 %

## 2019-04-04 DIAGNOSIS — J02.9 PHARYNGITIS, UNSPECIFIED ETIOLOGY: Primary | ICD-10-CM

## 2019-04-04 LAB — S PYO AG THROAT QL: NEGATIVE

## 2019-04-04 PROCEDURE — 87880 STREP A ASSAY W/OPTIC: CPT | Performed by: FAMILY MEDICINE

## 2019-04-04 PROCEDURE — 99213 OFFICE O/P EST LOW 20 MIN: CPT | Performed by: FAMILY MEDICINE

## 2019-04-04 PROCEDURE — 3008F BODY MASS INDEX DOCD: CPT | Performed by: FAMILY MEDICINE

## 2019-04-04 RX ORDER — AMOXICILLIN 875 MG/1
875 TABLET, COATED ORAL 2 TIMES DAILY
Qty: 20 TABLET | Refills: 0 | Status: SHIPPED | OUTPATIENT
Start: 2019-04-04 | End: 2019-04-14

## 2019-04-08 ENCOUNTER — OFFICE VISIT (OUTPATIENT)
Dept: GASTROENTEROLOGY | Facility: AMBULARY SURGERY CENTER | Age: 49
End: 2019-04-08
Payer: COMMERCIAL

## 2019-04-08 VITALS
DIASTOLIC BLOOD PRESSURE: 62 MMHG | BODY MASS INDEX: 22.48 KG/M2 | HEIGHT: 70 IN | SYSTOLIC BLOOD PRESSURE: 112 MMHG | TEMPERATURE: 98.7 F | HEART RATE: 82 BPM | RESPIRATION RATE: 14 BRPM | WEIGHT: 157 LBS

## 2019-04-08 DIAGNOSIS — K21.9 GASTROESOPHAGEAL REFLUX DISEASE WITHOUT ESOPHAGITIS: ICD-10-CM

## 2019-04-08 DIAGNOSIS — R93.89 ABNORMAL CT SCAN: Primary | ICD-10-CM

## 2019-04-08 DIAGNOSIS — K59.04 CHRONIC IDIOPATHIC CONSTIPATION: ICD-10-CM

## 2019-04-08 PROCEDURE — 99214 OFFICE O/P EST MOD 30 MIN: CPT | Performed by: PHYSICIAN ASSISTANT

## 2019-04-08 RX ORDER — FLUTICASONE PROPIONATE 50 MCG
1 SPRAY, SUSPENSION (ML) NASAL DAILY
COMMUNITY

## 2019-05-01 ENCOUNTER — OFFICE VISIT (OUTPATIENT)
Dept: NEPHROLOGY | Facility: CLINIC | Age: 49
End: 2019-05-01
Payer: COMMERCIAL

## 2019-05-01 VITALS
DIASTOLIC BLOOD PRESSURE: 78 MMHG | HEIGHT: 70 IN | SYSTOLIC BLOOD PRESSURE: 118 MMHG | WEIGHT: 156 LBS | HEART RATE: 84 BPM | BODY MASS INDEX: 22.33 KG/M2

## 2019-05-01 DIAGNOSIS — N20.0 KIDNEY STONE: Primary | ICD-10-CM

## 2019-05-01 DIAGNOSIS — N20.0 NEPHROLITHIASIS: ICD-10-CM

## 2019-05-01 PROCEDURE — 99214 OFFICE O/P EST MOD 30 MIN: CPT | Performed by: NURSE PRACTITIONER

## 2019-05-07 ENCOUNTER — ANNUAL EXAM (OUTPATIENT)
Dept: OBGYN CLINIC | Facility: CLINIC | Age: 49
End: 2019-05-07
Payer: COMMERCIAL

## 2019-05-07 VITALS
HEIGHT: 70 IN | BODY MASS INDEX: 21.76 KG/M2 | DIASTOLIC BLOOD PRESSURE: 82 MMHG | SYSTOLIC BLOOD PRESSURE: 126 MMHG | WEIGHT: 152 LBS

## 2019-05-07 DIAGNOSIS — Z01.419 WELL FEMALE EXAM WITH ROUTINE GYNECOLOGICAL EXAM: Primary | ICD-10-CM

## 2019-05-07 DIAGNOSIS — Z12.31 ENCOUNTER FOR SCREENING MAMMOGRAM FOR MALIGNANT NEOPLASM OF BREAST: ICD-10-CM

## 2019-05-07 PROBLEM — N30.01 ACUTE CYSTITIS WITH HEMATURIA: Status: RESOLVED | Noted: 2018-12-08 | Resolved: 2019-05-07

## 2019-05-07 PROBLEM — N20.0 KIDNEY STONE: Status: RESOLVED | Noted: 2019-01-03 | Resolved: 2019-05-07

## 2019-05-07 PROCEDURE — 99396 PREV VISIT EST AGE 40-64: CPT | Performed by: OBSTETRICS & GYNECOLOGY

## 2019-05-07 RX ORDER — MAGNESIUM GLUCONATE 30 MG(550)
TABLET ORAL
COMMUNITY
End: 2019-10-30 | Stop reason: SDUPTHER

## 2019-05-17 DIAGNOSIS — E03.4 HYPOTHYROIDISM DUE TO ACQUIRED ATROPHY OF THYROID: Primary | ICD-10-CM

## 2019-05-20 RX ORDER — LEVOTHYROXINE SODIUM 75 MCG
75 TABLET ORAL
Qty: 30 TABLET | Refills: 0 | Status: SHIPPED | OUTPATIENT
Start: 2019-05-20 | End: 2019-06-25 | Stop reason: SDUPTHER

## 2019-06-04 ENCOUNTER — ANESTHESIA EVENT (OUTPATIENT)
Dept: GASTROENTEROLOGY | Facility: AMBULARY SURGERY CENTER | Age: 49
End: 2019-06-04

## 2019-06-17 ENCOUNTER — HOSPITAL ENCOUNTER (OUTPATIENT)
Dept: GASTROENTEROLOGY | Facility: AMBULARY SURGERY CENTER | Age: 49
Setting detail: OUTPATIENT SURGERY
Discharge: HOME | End: 2019-06-17

## 2019-06-17 ENCOUNTER — ANESTHESIA (OUTPATIENT)
Dept: GASTROENTEROLOGY | Facility: AMBULARY SURGERY CENTER | Age: 49
End: 2019-06-17

## 2019-06-25 DIAGNOSIS — E03.4 HYPOTHYROIDISM DUE TO ACQUIRED ATROPHY OF THYROID: ICD-10-CM

## 2019-06-25 RX ORDER — LEVOTHYROXINE SODIUM 75 MCG
TABLET ORAL
Qty: 30 TABLET | Refills: 0 | Status: SHIPPED | OUTPATIENT
Start: 2019-06-25

## 2019-06-25 NOTE — TELEPHONE ENCOUNTER
Patient should contact office for ALL future refills  No longer accepting auto-refill requests from pharmacies due to medication reconciliation errors

## 2019-07-11 ENCOUNTER — OFFICE VISIT (OUTPATIENT)
Dept: FAMILY MEDICINE CLINIC | Facility: OTHER | Age: 49
End: 2019-07-11
Payer: COMMERCIAL

## 2019-07-11 VITALS
OXYGEN SATURATION: 98 % | DIASTOLIC BLOOD PRESSURE: 80 MMHG | HEIGHT: 70 IN | WEIGHT: 150.31 LBS | BODY MASS INDEX: 21.52 KG/M2 | SYSTOLIC BLOOD PRESSURE: 106 MMHG | TEMPERATURE: 98.4 F | HEART RATE: 96 BPM

## 2019-07-11 DIAGNOSIS — Z00.00 ANNUAL PHYSICAL EXAM: Primary | ICD-10-CM

## 2019-07-11 DIAGNOSIS — E78.2 MIXED HYPERLIPIDEMIA: ICD-10-CM

## 2019-07-11 PROCEDURE — 99396 PREV VISIT EST AGE 40-64: CPT | Performed by: FAMILY MEDICINE

## 2019-07-11 RX ORDER — LOTEPREDNOL ETABONATE 5 MG/G
OINTMENT OPHTHALMIC
Refills: 1 | COMMUNITY
Start: 2019-06-15

## 2019-07-11 NOTE — PATIENT INSTRUCTIONS

## 2019-07-11 NOTE — PROGRESS NOTES
ADULT ANNUAL PHYSICAL  [facility] - [facility]    NAME: Jeronimo Merrill  AGE: 50 y o  SEX: female  : 1970     DATE: 2019       Chief Complaint:     Chief Complaint   Patient presents with    Annual Exam      History of Present Illness:     Adult Annual Physical   Patient here for a comprehensive physical exam  The patient reports no problems  Diet and Physical Activity  · Diet/Nutrition: poor diet, high fat diet and limited fruits/vegetables  · Exercise: moderate cardiovascular exercise and 3-4 times a week on average  Depression Screening  PHQ-9 Depression Screening    PHQ-9:    Frequency of the following problems over the past two weeks:       Little interest or pleasure in doing things:  0 - not at all  Feeling down, depressed, or hopeless:  0 - not at all  PHQ-2 Score:  0       General Health  · Sleep: sleeps well and gets 7-8 hours of sleep on average  · Hearing: normal - bilateral   · Vision: goes for regular eye exams and wears glasses  · Dental: regular dental visits  /GYN Health  · Patient is: postmenopausal (early menopause <40 yoa)  · GYN: Dr Cassie Shore     Review of Systems:     Review of Systems   Constitutional: Negative for appetite change, fatigue, fever and unexpected weight change  HENT: Negative for congestion, dental problem, ear pain, postnasal drip, sore throat and tinnitus  Eyes: Negative for pain, discharge and visual disturbance  Respiratory: Negative for cough, shortness of breath and wheezing  Cardiovascular: Negative for chest pain, palpitations and leg swelling  Gastrointestinal: Negative for abdominal pain, constipation, diarrhea, nausea and vomiting  Endocrine: Negative for cold intolerance and heat intolerance  Genitourinary: Negative for difficulty urinating, dysuria, flank pain and urgency  Musculoskeletal: Negative for arthralgias, back pain, joint swelling and myalgias     Skin: Negative for rash and wound  Allergic/Immunologic: Negative for immunocompromised state  Neurological: Negative for dizziness, syncope, speech difficulty, weakness and numbness  Hematological: Negative for adenopathy  Does not bruise/bleed easily  Psychiatric/Behavioral: Negative for confusion, dysphoric mood and sleep disturbance  The patient is not nervous/anxious  Past Medical History:     Past Medical History:   Diagnosis Date    Anxiety     Chronic otitis externa     Last assessed 12/18/2017    Disease of thyroid gland     GERD (gastroesophageal reflux disease)     Hyperlipidemia     Kidney stone     Migraines     Miscarriage       Past Surgical History:     Past Surgical History:   Procedure Laterality Date    DILATION AND CURETTAGE OF UTERUS      for SAB     EAR SURGERY      age 6    [facility] RETROGRADE P[facility]  1/11/2019    OK CYSTO/URETERO W/LITHOTRIPSY &INDWELL STENT INSRT Left 1/11/2019    Procedure: CYSTOSCOPY URETEROSCOPY WITH LITHOTRIPSY HOLMIUM LASER, RETROGRADE PYELOGRAM AND INSERTION STENT URETERAL;  Surgeon: Berta Armijo MD;  Location: [facility]  MAIN OR;  Service: Urology    OK ESOPHAGOGASTRODUODENOSCOPY TRANSORAL DIAGNOSTIC N/A 12/27/2017    Procedure: ESOPHAGOGASTRODUODENOSCOPY (EGD); Surgeon: Rocky Snell MD;  Location: [facility]  GI LAB;   Service: Gastroenterology    TUBAL LIGATION      VEIN LIGATION AND STRIPPING Bilateral     WISDOM TOOTH EXTRACTION        Social History:     Social History     Socioeconomic History    Marital status: /Civil Union     Spouse name: None    Number of children: None    Years of education: 12    Highest education level: None   Occupational History     Employer: [employer]   Social Needs    Financial resource strain: None    Food insecurity:     Worry: None     Inability: None    Transportation needs:     Medical: None     Non-medical: None   Tobacco Use    Smoking status: Never Smoker    Smokeless tobacco: Never Used Substance and Sexual Activity    Alcohol use: No    Drug use: Never    Sexual activity: Yes     Partners: Male     Birth control/protection: Post-menopausal, None   Lifestyle    Physical activity:     Days per week: None     Minutes per session: None    Stress: None   Relationships    Social connections:     Talks on phone: None     Gets together: None     Attends Sikh service: None     Active member of club or organization: None     Attends meetings of clubs or organizations: None     Relationship status: None    Intimate partner violence:     Fear of current or ex partner: None     Emotionally abused: None     Physically abused: None     Forced sexual activity: None   Other Topics Concern    None   Social History Narrative    Does not exercise      Family History:     Family History   Problem Relation Age of Onset    Hypertension Mother     Hypertension Father     Hyperlipidemia Father     No Known Problems Sister     Asthma Daughter     Autism Daughter     Anxiety disorder Daughter     Asthma Maternal Grandmother     No Known Problems Maternal Grandfather     No Known Problems Paternal Grandmother     No Known Problems Paternal Grandfather     Asthma Daughter     Asthma Daughter       Current Medications:     Current Outpatient Medications   Medication Sig Dispense Refill    FLUoxetine (PROzac) 40 MG capsule Take 1 capsule (40 mg total) by mouth daily 90 capsule 1    fluticasone (FLONASE) 50 mcg/act nasal spray 1 spray into each nostril daily      ibuprofen (MOTRIN) 200 mg tablet Take 3 tablets (600 mg total) by mouth every 6 (six) hours as needed for mild pain  0    loratadine (CLARITIN) 10 mg tablet Take 10 mg by mouth daily in the early morning        LORazepam (ATIVAN) 0 5 mg tablet Take 1 tablet (0 5 mg total) by mouth daily as needed for anxiety 15 tablet 0    LOTEMAX 0 5 % OINT APPLY A SMALL AMOUNT INTO BOTH EYES TID PRN  1    Potassium Gluconate (HM POTASSIUM) 595 (99 K) MG TABS Take by mouth      ranitidine (ZANTAC) 150 mg tablet Take 1 tablet (150 mg total) by mouth 2 (two) times a day 60 tablet 11    rizatriptan (MAXALT) 10 MG tablet Take 1 tablet (10 mg total) by mouth once as needed for migraine for up to 1 dose May repeat in 2 hours if needed 9 tablet 0    SYNTHROID 75 MCG tablet TAKE 1 TABLET(75 MCG) BY MOUTH DAILY EARLY MORNING 30 tablet 0     No current facility-administered medications for this visit  Allergies: Allergies   Allergen Reactions    Latex Rash     Where touch with Latex  Allergic to Kiwi and bananas    Biaxin [Clarithromycin] Diarrhea    Cefzil [Cefprozil] Dizziness    Flagyl [Metronidazole] Nausea Only    Percocet [Oxycodone-Acetaminophen] Nausea Only      Physical Exam:     /80 (BP Location: Left arm, Patient Position: Sitting, Cuff Size: Adult)   Pulse 96   Temp 98 4 °F (36 9 °C) (Tympanic)   Ht 5' 10" (1 778 m)   Wt 68 2 kg (150 lb 5 oz)   SpO2 98%   BMI 21 57 kg/m²     Physical Exam   Constitutional: She is oriented to person, place, and time  She appears well-developed and well-nourished  No distress  Body mass index is 21 57 kg/m²  HENT:   Head: Normocephalic and atraumatic  Right Ear: Hearing, tympanic membrane, external ear and ear canal normal    Left Ear: Hearing, tympanic membrane, external ear and ear canal normal    Nose: Nose normal    Mouth/Throat: Uvula is midline, oropharynx is clear and moist and mucous membranes are normal  No oropharyngeal exudate  Eyes: Pupils are equal, round, and reactive to light  Conjunctivae and EOM are normal  No scleral icterus  Neck: Normal range of motion  Neck supple  No thyromegaly present  Cardiovascular: Normal rate, regular rhythm and normal heart sounds  No murmur heard  Pulmonary/Chest: Effort normal and breath sounds normal  No respiratory distress  She has no wheezes  She has no rales  Abdominal: Soft   Bowel sounds are normal  She exhibits no distension and no mass  There is no tenderness  Musculoskeletal: Normal range of motion  She exhibits no edema, tenderness or deformity  Lymphadenopathy:     She has no cervical adenopathy  Neurological: She is alert and oriented to person, place, and time  She has normal reflexes  No cranial nerve deficit  Coordination normal    Skin: Skin is warm and dry  No rash noted  No erythema  No pallor  Psychiatric: She has a normal mood and affect  Her behavior is normal    Vitals reviewed  Assessment and Plan:     Problem List Items Addressed This Visit        Other    Hyperlipidemia    Relevant Orders    Ambulatory referral to Nutrition Services    Comprehensive metabolic panel    Lipid panel  Plan to recheck lipids and LFTs in January 2020--if lifestyle modifications do not improve lipids over the next 6 months, would discuss statin therapy with patient at follow-up visit  Suggested patient consider fish oil capsules 4g daily in the interim      Other Visit Diagnoses     Annual physical exam    -  Primary          Immunizations and preventive care screenings were discussed with patient today  Appropriate education was printed on patient's after visit summary  Counseling:  Alcohol/drug use: discussed moderation in alcohol intake and avoidance of illicit drug use  Dental Health: discussed importance of regular tooth brushing, flossing, and dental visits  Injury prevention: discussed safety/seat belts, safety helmets, smoke detectors, carbon dioxide detectors, and smoking near bedding or upholstery  · Sexual health: discussed sexually transmitted diseases, partner selection, use of condoms, avoidance of unintended pregnancy, and contraceptive alternatives  Return in about 6 months (around 1/11/2020) for Recheck Lipids        Yael Weems DO  [facility] [address] [id number],[facility]

## 2019-08-06 ENCOUNTER — TELEPHONE (OUTPATIENT)
Dept: NEPHROLOGY | Facility: CLINIC | Age: 49
End: 2019-08-06

## 2019-08-06 NOTE — TELEPHONE ENCOUNTER
I left a message for patient to schedule October follow up with Dr Frieda Dalton in the [facility] [place] office

## 2019-10-01 DIAGNOSIS — F41.1 GENERALIZED ANXIETY DISORDER: ICD-10-CM

## 2019-10-01 RX ORDER — FLUOXETINE HYDROCHLORIDE 40 MG/1
CAPSULE ORAL
Qty: 90 CAPSULE | Refills: 0 | OUTPATIENT
Start: 2019-10-01

## 2019-10-01 NOTE — TELEPHONE ENCOUNTER
Please contact patient to find out if refill is needed and valid  Review office refill policy as well  Thanks

## 2019-10-04 DIAGNOSIS — F41.1 GENERALIZED ANXIETY DISORDER: ICD-10-CM

## 2019-10-04 RX ORDER — FLUOXETINE HYDROCHLORIDE 40 MG/1
40 CAPSULE ORAL DAILY
Qty: 90 CAPSULE | Refills: 1 | Status: SHIPPED | OUTPATIENT
Start: 2019-10-04 | End: 2020-01-07

## 2019-10-22 ENCOUNTER — IMMUNIZATIONS (OUTPATIENT)
Dept: FAMILY MEDICINE CLINIC | Facility: OTHER | Age: 49
End: 2019-10-22
Payer: COMMERCIAL

## 2019-10-22 DIAGNOSIS — Z23 ENCOUNTER FOR IMMUNIZATION: ICD-10-CM

## 2019-10-22 PROCEDURE — 90471 IMMUNIZATION ADMIN: CPT

## 2019-10-22 PROCEDURE — 90686 IIV4 VACC NO PRSV 0.5 ML IM: CPT

## 2019-10-30 RX ORDER — HYDROCODONE BITARTRATE AND ACETAMINOPHEN 5; 325 MG/1; MG/1
TABLET ORAL
COMMUNITY
End: 2019-10-31

## 2019-10-30 RX ORDER — NITROFURANTOIN MACROCRYSTALS 100 MG/1
CAPSULE ORAL
COMMUNITY
End: 2019-10-31

## 2019-10-30 RX ORDER — SULFAMETHOXAZOLE AND TRIMETHOPRIM 800; 160 MG/1; MG/1
TABLET ORAL
COMMUNITY
End: 2019-10-31

## 2019-10-30 RX ORDER — DOCUSATE SODIUM 100 MG/1
CAPSULE, LIQUID FILLED ORAL
COMMUNITY
End: 2019-10-31

## 2019-10-30 RX ORDER — SERTRALINE HYDROCHLORIDE 100 MG/1
TABLET, FILM COATED ORAL
COMMUNITY
End: 2019-10-31

## 2019-10-30 RX ORDER — ONDANSETRON 4 MG/1
TABLET, FILM COATED ORAL
COMMUNITY
End: 2019-10-31

## 2019-10-30 RX ORDER — TAMSULOSIN HYDROCHLORIDE 0.4 MG/1
CAPSULE ORAL
COMMUNITY
End: 2019-10-31 | Stop reason: CLARIF

## 2019-10-30 RX ORDER — POTASSIUM CITRATE 10 MEQ/1
TABLET, EXTENDED RELEASE ORAL
COMMUNITY
End: 2020-08-31 | Stop reason: SDUPTHER

## 2019-10-30 RX ORDER — PHENAZOPYRIDINE HYDROCHLORIDE 200 MG/1
TABLET, FILM COATED ORAL
COMMUNITY
End: 2019-10-31

## 2019-10-31 ENCOUNTER — CONSULT (OUTPATIENT)
Dept: VASCULAR SURGERY | Facility: CLINIC | Age: 49
End: 2019-10-31
Payer: COMMERCIAL

## 2019-10-31 VITALS
SYSTOLIC BLOOD PRESSURE: 122 MMHG | TEMPERATURE: 97.6 F | WEIGHT: 155 LBS | DIASTOLIC BLOOD PRESSURE: 78 MMHG | RESPIRATION RATE: 16 BRPM | HEART RATE: 96 BPM | BODY MASS INDEX: 22.19 KG/M2 | HEIGHT: 70 IN

## 2019-10-31 DIAGNOSIS — I83.812 VARICOSE VEINS OF LEG WITH PAIN, LEFT: Primary | ICD-10-CM

## 2019-10-31 PROCEDURE — 99204 OFFICE O/P NEW MOD 45 MIN: CPT | Performed by: PHYSICIAN ASSISTANT

## 2019-10-31 NOTE — LETTER
October 31, 2019     [address]  [address] [place] [address]    Patient: Michelle Watson   YOB: 1970   Date of Visit: 10/31/2019     Dear Dr Jackie Pepe      Thank you for referring Kellen Ureña to me for evaluation  Below are the relevant portions of my assessment and plan of care  If you have questions, please do not hesitate to call me  I look forward to following Jessica along with you  Sincerely,        Palomo Varela PA-C        CC: No Recipients    Progress Notes:    Assessment/Plan:    Varicose veins of leg with pain, left  Varicose veins, bilaterally with pain    59-year-old female who presents for evaluation of venous varicosities  She has longstanding history of varicose veins  In 2002, she underwent laser ablation by Dr Severa Barefoot  Records from 2002 not available  Prior to that time she had undergone phlebectomy in the left leg at other facility  Symptoms improved after laser ablation, though she developed a progressive venous varicosities  In the past months, she has now become symptomatic with burning behind the right knee, in addition to daily leg pain  She complains of symptoms of tired, heavy and achy LEFT leg  She does have a slightly bulging varicose veins to both legs LEFT greater than right  No itching  No redness or tenderness in the veins  She wears compression occasionally  She elevates often  She has no history of blood clots or phlebitis  There is a family history of varicose veins  Venous varicosities up to 4-5 mm wide  No warmth  Plan:  Patient is concerned that her legs may continue to worsen over time  Currently, the legs appear stable  She did have a fall on her R knee several months ago and she still complains of R anterior knee pain  The knee is not tender to palpation and structurally appears sound  However, I wonder if the injury /swelling to the knee caused change in venous   I would like her to follow up with PCP regarding the knee      We had a detailed discussion regarding the pathophysiology and indications for treatment of venous disease  She uses compression on occasion  I would like to see her continue to wear graded compression stockings on a daily basis  We will check a reflux study to help sort out her symptoms        - Order for prescription graded compression stockings of 20-30 mm Hg  - Compression, elevation, low-sodium and regular exercise  - Patient education for venous insufficiency    - Follow up PCP regarding knee  - Check venous reflux study  - Follow up 3 months

## 2019-10-31 NOTE — PROGRESS NOTES
Assessment/Plan:    Varicose veins of leg with pain, left  Varicose veins, bilaterally with pain    66-year-old female who presents for evaluation of venous varicosities  She has longstanding history of varicose veins  In 2002, she underwent laser ablation by Dr Layla Huff  Records from 2002 not available  Prior to that time she had undergone phlebectomy in the left leg at other facility  Symptoms improved after laser ablation, though she developed a progressive venous varicosities  In the past months, she has now become symptomatic with burning behind the right knee, in addition to daily leg pain  She complains of symptoms of tired, heavy and achy LEFT leg  She does have a slightly bulging varicose veins to both legs LEFT greater than right  No itching  No redness or tenderness in the veins  She wears compression occasionally  She elevates often  She has no history of blood clots or phlebitis  There is a family history of varicose veins  Venous varicosities up to 4-5 mm wide  No warmth  Plan:  Patient is concerned that her legs may continue to worsen over time  Currently, the legs appear stable  She did have a fall on her R knee several months ago and she still complains of R anterior knee pain  The knee is not tender to palpation and structurally appears sound  However, I wonder if the injury /swelling to the knee caused change in venous  I would like her to follow up with PCP regarding the knee  We had a detailed discussion regarding the pathophysiology and indications for treatment of venous disease  She uses compression on occasion  I would like to see her continue to wear graded compression stockings on a daily basis  We will check a reflux study to help sort out her symptoms        - Order for prescription graded compression stockings of 20-30 mm Hg  - Compression, elevation, low-sodium and regular exercise  - Patient education for venous insufficiency    - Follow up PCP regarding knee  - Check venous reflux study  - Follow up 3 months            Subjective:      Patient ID: Marita Cervantes is a 52 y o  female  Pt is new to our practice and is here to be evaluated for bilateral lower extremity varicose veins  During the past year, she has developed increased lower extremity varicose veins  The veins behind the LEFT knee have become painful  She uses compression stockings occasionally  She just purchased new pantyhose, moderate compression stockings  She regularly elevates her legs  Pt has had no testing for this condition  Pt is not taking any blood thinning medications or statins  She has no claudication  No personal or family history of blood clots  There is a fm hx of VV  We reviewed her medical story and updated her medication history  The following portions of the patient's history were reviewed and updated as appropriate: allergies, current medications, past family history, past medical history, past social history, past surgical history and problem list     Review of Systems   Constitutional: Negative  HENT: Positive for postnasal drip and sneezing  Eyes: Positive for visual disturbance  Respiratory: Negative  Cardiovascular: Negative  Gastrointestinal: Negative  Endocrine: Negative  Genitourinary: Negative  Musculoskeletal:        Leg Pain     Allergic/Immunologic: Positive for environmental allergies and food allergies  Neurological: Positive for headaches  Hematological: Negative  Psychiatric/Behavioral: The patient is nervous/anxious  Objective:      /78 (BP Location: Left arm, Patient Position: Sitting, Cuff Size: Adult)   Pulse 96   Temp 97 6 °F (36 4 °C) (Tympanic)   Resp 16   Ht 5' 10" (1 778 m)   Wt 70 3 kg (155 lb)   BMI 22 24 kg/m²        Physical Exam   Constitutional: She is oriented to person, place, and time  She appears well-developed and well-nourished  She is cooperative     HENT:   Head: Normocephalic and atraumatic  Eyes: Pupils are equal, round, and reactive to light  EOM are normal    Neck: Trachea normal  Neck supple  No JVD present  No thyromegaly present  Cardiovascular: Normal rate, regular rhythm, S1 normal, S2 normal and normal heart sounds  Exam reveals no gallop and no friction rub  No murmur heard  Pulses:       Carotid pulses are 2+ on the right side, and 2+ on the left side  Radial pulses are 2+ on the right side, and 2+ on the left side  Dorsalis pedis pulses are 2+ on the right side, and 2+ on the left side  Bilateral LE varicose veins L > R most varicosities about 3-5 mm  VV present in R/L popliteal fossa, lower legs and ankles  No red or tender veins  Pulmonary/Chest: Effort normal and breath sounds normal  No accessory muscle usage  No respiratory distress  She has no wheezes  She has no rales  Abdominal: Soft  Bowel sounds are normal  She exhibits no distension  There is no hepatosplenomegaly  There is no tenderness  Musculoskeletal: Normal range of motion  She exhibits no edema or deformity  Neurological: She is alert and oriented to person, place, and time  Grossly normal    Skin: Skin is warm and dry  No lesion and no rash noted  No cyanosis  Nails show no clubbing  Psychiatric: She has a normal mood and affect  Nursing note and vitals reviewed  I have reviewed and made appropriate changes to the review of systems input by the medical assistant      Vitals:    10/31/19 1448   BP: 122/78   BP Location: Left arm   Patient Position: Sitting   Cuff Size: Adult   Pulse: 96   Resp: 16   Temp: 97 6 °F (36 4 °C)   TempSrc: Tympanic   Weight: 70 3 kg (155 lb)   Height: 5' 10" (1 778 m)       Patient Active Problem List   Diagnosis    Influenza    Allergic rhinitis    Anxiety disorder    Early menopause    Gastroesophageal reflux disease without esophagitis    Hyperlipidemia    Rhus dermatitis    Migraines    Lower back pain    Lactose intolerance    Hypothyroidism due to acquired atrophy of thyroid    Sprain of ulnar collateral ligament of left elbow    Nephrolithiasis    Abnormal CT scan    Chronic idiopathic constipation    Varicose veins of leg with pain, left       Past Surgical History:   Procedure Laterality Date    DILATION AND CURETTAGE OF UTERUS      for SAB     EAR SURGERY      age 6    [facility] RETROGRADE PYEL[facility]GRAM  1/11/2019    [place] CYSTO/URETERO W/LITHOTRIPSY &INDWELL STENT INSRT Left 1/11/2019    Procedure: CYSTOSCOPY URETEROSCOPY WITH LITHOTRIPSY HOLMIUM LASER, RETROGRADE PYELOGRAM AND INSERTION STENT URETERAL;  Surgeon: Vanita Larkin MD;  [facility];  Service: Urology    AL ESOPHAGOGASTRODUODENOSCOPY TRANSORAL DIAGNOSTIC N/A 12/27/2017    Procedure: ESOPHAGOGASTRODUODENOSCOPY (EGD); Surgeon: Tiago Kenney MD;  [facility];   Service: Gastroenterology    TUBAL LIGATION      VEIN LIGATION AND STRIPPING Bilateral     WISDOM TOOTH EXTRACTION         Family History   Problem Relation Age of Onset    Hypertension Mother     Hypertension Father     Hyperlipidemia Father     No Known Problems Sister     Asthma Daughter     Autism Daughter     Anxiety disorder Daughter     Asthma Maternal Grandmother     No Known Problems Maternal Grandfather     No Known Problems Paternal Grandmother     No Known Problems Paternal Grandfather     Asthma Daughter     Asthma Daughter        Social History     Socioeconomic History    Marital status: /Civil Union     Spouse name: Not on file    Number of children: Not on file    Years of education: 15    Highest education level: Not on file   Occupational History     Employer: [employer] Needs    Financial resource strain: Not on file    Food insecurity:     Worry: Not on file     Inability: Not on file    Transportation needs:     Medical: Not on file Non-medical: Not on file   Tobacco Use    Smoking status: Never Smoker    Smokeless tobacco: Never Used   Substance and Sexual Activity    Alcohol use: No    Drug use: Never    Sexual activity: Yes     Partners: Male     Birth control/protection: Post-menopausal, None   Lifestyle    Physical activity:     Days per week: Not on file     Minutes per session: Not on file    Stress: Not on file   Relationships    Social connections:     Talks on phone: Not on file     Gets together: Not on file     Attends [employer] service: Not on file     Active member of club or organization: Not on file     Attends meetings of clubs or organizations: Not on file     Relationship status: Not on file    Intimate partner violence:     Fear of current or ex partner: Not on file     Emotionally abused: Not on file     Physically abused: Not on file     Forced sexual activity: Not on file   Other Topics Concern    Not on file   Social History Narrative    Does not exercise       Allergies   Allergen Reactions    Latex Rash     Where touch with Latex   Allergic to Kiwi and bananas    Biaxin [Clarithromycin] Diarrhea    Cefzil [Cefprozil] Dizziness    Flagyl [Metronidazole] Nausea Only    Percocet [Oxycodone-Acetaminophen] Nausea Only         Current Outpatient Medications:     FLUoxetine (PROzac) 40 MG capsule, Take 1 capsule (40 mg total) by mouth daily, Disp: 90 capsule, Rfl: 1    ibuprofen (MOTRIN) 200 mg tablet, Take 3 tablets (600 mg total) by mouth every 6 (six) hours as needed for mild pain, Disp: , Rfl: 0    loratadine (CLARITIN) 10 mg tablet, Take 10 mg by mouth daily in the early morning  , Disp: , Rfl:     potassium citrate (UROCIT-K) 10 mEq, potassium citrate ER 10 mEq (1,080 mg) tablet,extended release  TK 1 T PO BID, Disp: , Rfl:     ranitidine (ZANTAC) 150 mg tablet, Take 1 tablet (150 mg total) by mouth 2 (two) times a day, Disp: 60 tablet, Rfl: 11    SYNTHROID 75 MCG tablet, TAKE 1 TABLET(75 MCG) BY MOUTH DAILY EARLY MORNING, Disp: 30 tablet, Rfl: 0    Elastic Bandages & Supports (MEDICAL COMPRESSION PANTYHOSE) MISC, by Does not apply route daily 15-20mmHg, Disp: 2 each, Rfl: 4    fluticasone (FLONASE) 50 mcg/act nasal spray, 1 spray into each nostril daily, Disp: , Rfl:     LORazepam (ATIVAN) 0 5 mg tablet, Take 1 tablet (0 5 mg total) by mouth daily as needed for anxiety (Patient not taking: Reported on 10/31/2019), Disp: 15 tablet, Rfl: 0    LOTEMAX 0 5 % OINT, APPLY A SMALL AMOUNT INTO BOTH EYES TID PRN, Disp: , Rfl: 1    rizatriptan (MAXALT) 10 MG tablet, Take 1 tablet (10 mg total) by mouth once as needed for migraine for up to 1 dose May repeat in 2 hours if needed (Patient not taking: Reported on 10/31/2019), Disp: 9 tablet, Rfl: 0

## 2019-10-31 NOTE — PATIENT INSTRUCTIONS
Varicose veins    Neri Holland S a 28-year-old female who presents for evaluation of venous varicosities  She has longstanding history of varicose veins which started at age 12 and worsened after pregnancy  In 2002, she underwent laser ablation by Dr  Rosiland Shaper  Records from 2002 not available  Prior to that time she had undergone phlebectomy in the left leg at other facility  Over the years she has developed a progressive venous varicosities  In the past months she has now become symptomatic with burning behind the right knee  She has a daily symptoms of right leg tired, heavy and achiness  She does have a slightly bulging varicose veins to both legs left greater than right  No itching  No redness or tenderness in the veins  She wears compression occasionally  She elevates often  She has no history of blood clots or phlebitis  There is a family history of varicose veins  We had a detailed discussion regarding the pathophysiology and treatment of venous disease  She uses compression on occasion  I would like to see her continue to wear graded compression stockings on a daily basis  - Order for prescription graded compression stockings of 20-30 mm Hg  - Wear stocking EVERY day to help control swelling in legs and remove at night  - Elevate legs while at rest  - Continue healthy life-style changes; heart-healthy, low sodium diet; regular exercise  - Patient education for venous insufficiency  - Check venous reflux study  - Follow up PCP regarding knee  - Follow up 3 months           Varicose Veins, Ambulatory Care   GENERAL INFORMATION:   Varicose veins  are veins that become large, twisted, and swollen  They are common on the back of your calves, knees, and thighs     Common symptoms include the following:   · Blue, purple, or bulging veins in your legs     · Pain, swelling, or muscle cramps in your legs    · Feeling of heaviness in your legs  Seek immediate care for the following symptoms:   · A wound that does not heal or is infected    · An injury that has broken your skin and caused your varicose veins to bleed    · Swollen and hard leg    · Pain in your leg that does not go away or gets worse    · Legs or feet turn blue or black    · Warmth, tenderness, and pain in your leg (may also look swollen and red)  Treatment of varicose veins  aims to decrease symptoms, improve appearance, and prevent further problems  Treatment will depend on which veins are affected and how severe your condition is  Prescription pain medicine may be given  Ask how to take this medicine safely  Procedures may be done to remove your varicose veins  Your healthcare provider may inject a solution or use a laser to close the varicose veins  Surgery to remove long veins may also be done  Ask your healthcare provider for more information about procedures used in treating varicose veins  Manage varicose veins:   · Wear pressure stockings  The stockings are tight and put pressure on your legs  They improve blood flow and help prevent clots  · Elevate  your legs above the level of your heart for 15 to 30 minutes several times a day  This will help blood to flow back to your heart  · Avoid sitting or standing for long periods of time  This can cause the blood to collect in your legs and make your symptoms worse  Walk around for a few minutes every hour to get blood moving in your legs  · Avoid wearing tight clothing and shoes  Avoid wearing high-heeled shoes  Do not wear clothes that are tight around the waist     · Get plenty of exercise  Talk to your healthcare provider about the best exercise plan for you  Exercise can decrease your blood pressure and improve your health  Bend or rotate your ankles several times every hour  This will help blood to flow back to the heart  · Maintain a healthy weight  Your heart works harder when you are overweight and this can make varicose vein worse   Ask how much you should weigh  Ask him to help you create a weight loss plan if you are overweight  · Do not smoke  If you smoke, it is never too late to quit  Ask for information if you need help quitting  Follow up with your healthcare provider as directed:  Write down your questions so you remember to ask them during your visits  CARE AGREEMENT:   You have the right to help plan your care  Learn about your health condition and how it may be treated  Discuss treatment options with your caregivers to decide what care you want to receive  You always have the right to refuse treatment  The above information is an  only  It is not intended as medical advice for individual conditions or treatments  Talk to your doctor, nurse or pharmacist before following any medical regimen to see if it is safe and effective for you  © 2014 [address] is for End User's use only and may not be sold, redistributed or otherwise used for commercial purposes  All illustrations and images included in CareNotes® are the copyrighted property of A D A M [employer]  or Milton Sandoval

## 2019-11-01 NOTE — ASSESSMENT & PLAN NOTE
Varicose veins, bilaterally with pain    51-year-old female who presents for evaluation of venous varicosities  She has longstanding history of varicose veins  In 2002, she underwent laser ablation by Dr Faith Barr  Records from 2002 not available  Prior to that time she had undergone phlebectomy in the left leg at other facility  Symptoms improved after laser ablation, though she developed a progressive venous varicosities  In the past months, she has now become symptomatic with burning behind the right knee, in addition to daily leg pain  She complains of symptoms of tired, heavy and achy LEFT leg  She does have a slightly bulging varicose veins to both legs LEFT greater than right  No itching  No redness or tenderness in the veins  She wears compression occasionally  She elevates often  She has no history of blood clots or phlebitis  There is a family history of varicose veins  Venous varicosities up to 4-5 mm wide  No warmth  Plan:  Patient is concerned that her legs may continue to worsen over time  Currently, the legs appear stable  She did have a fall on her R knee several months ago and she still complains of R anterior knee pain  The knee is not tender to palpation and structurally appears sound  However, I wonder if the injury /swelling to the knee caused change in venous  I would like her to follow up with PCP regarding the knee  We had a detailed discussion regarding the pathophysiology and indications for treatment of venous disease  She uses compression on occasion  I would like to see her continue to wear graded compression stockings on a daily basis  We will check a reflux study to help sort out her symptoms        - Order for prescription graded compression stockings of 20-30 mm Hg  - Compression, elevation, low-sodium and regular exercise  - Patient education for venous insufficiency    - Follow up PCP regarding knee  - Check venous reflux study  - Follow up 3 months UNIT/ML injection pen  Inject 15 Units into the skin nightly             insulin lispro (HUMALOG KWIKPEN) 100 UNIT/ML pen  Inject 5 Units into the skin 3 times daily (before meals)             Insulin Pen Needle 32G X 4 MM MISC  1 each by Does not apply route daily             Insulin Pen Needle 32G X 4 MM MISC  1 each by Does not apply route daily             Insulin Syringe-Needle U-100 (INSULIN SYRINGE 1CC/30GX5/16\") 30G X 5/16\" 1 ML MISC  USE ONE DAILY             levothyroxine (SYNTHROID) 50 MCG tablet  Take 1 tablet by mouth Daily               Activity: activity as tolerated  Diet: diabetic diet    Lis Trimble DO  [facility] [id number]  [phone]    On 7/19/2019  Hospital Follow-up, at 9:20      Signed:  Sandra Paz DO  7/13/2019  10:48 AM

## 2019-11-11 ENCOUNTER — OFFICE VISIT (OUTPATIENT)
Dept: FAMILY MEDICINE CLINIC | Facility: OTHER | Age: 49
End: 2019-11-11
Payer: COMMERCIAL

## 2019-11-11 VITALS
TEMPERATURE: 98.5 F | HEIGHT: 70 IN | BODY MASS INDEX: 21.94 KG/M2 | OXYGEN SATURATION: 98 % | SYSTOLIC BLOOD PRESSURE: 126 MMHG | WEIGHT: 153.25 LBS | HEART RATE: 86 BPM | DIASTOLIC BLOOD PRESSURE: 86 MMHG

## 2019-11-11 DIAGNOSIS — M25.562 ACUTE PAIN OF LEFT KNEE: Primary | ICD-10-CM

## 2019-11-11 PROBLEM — L29.9 PRURITIC DISORDER: Status: ACTIVE | Noted: 2019-11-11

## 2019-11-11 PROBLEM — R21 RASH AND OTHER NONSPECIFIC SKIN ERUPTION: Status: ACTIVE | Noted: 2019-11-11

## 2019-11-11 PROBLEM — M79.609 PAIN IN SOFT TISSUES OF LIMB: Status: ACTIVE | Noted: 2019-11-11

## 2019-11-11 PROBLEM — I83.90 ASYMPTOMATIC VARICOSE VEINS: Status: ACTIVE | Noted: 2019-11-11

## 2019-11-11 PROCEDURE — 99214 OFFICE O/P EST MOD 30 MIN: CPT | Performed by: FAMILY MEDICINE

## 2019-11-11 RX ORDER — NAPROXEN 500 MG/1
500 TABLET ORAL 2 TIMES DAILY WITH MEALS
Qty: 28 TABLET | Refills: 0 | Status: SHIPPED | OUTPATIENT
Start: 2019-11-11 | End: 2020-08-31

## 2019-11-11 NOTE — PROGRESS NOTES
Subjective:      Patient ID: Joel Barraza is a 52 y o  female  Knee Pain    Incident onset: July (fall);  pain began 1 month ago  The incident occurred at home  The injury mechanism was a fall  The pain is present in the left knee  The quality of the pain is described as aching  The patient is experiencing no pain  The pain has been intermittent since onset  Associated symptoms include a loss of motion  Pertinent negatives include no inability to bear weight, loss of sensation, muscle weakness, numbness or tingling  She reports no foreign bodies present  The symptoms are aggravated by movement  She has tried rest, ice and elevation for the symptoms  The treatment provided mild relief         The following portions of the patient's history were reviewed and updated as appropriate: allergies, current medications, past family history, past medical history, past social history, past surgical history and problem list       Current Outpatient Medications:     Elastic Bandages & Supports ([place]) MISC, by Does not apply route daily 15-20mmHg, Disp: 2 each, Rfl: 4    FLUoxetine (PROzac) 40 MG capsule, Take 1 capsule (40 mg total) by mouth daily, Disp: 90 capsule, Rfl: 1    fluticasone (FLONASE) 50 mcg/act nasal spray, 1 spray into each nostril daily, Disp: , Rfl:     ibuprofen (MOTRIN) 200 mg tablet, Take 3 tablets (600 mg total) by mouth every 6 (six) hours as needed for mild pain, Disp: , Rfl: 0    loratadine (CLARITIN) 10 mg tablet, Take 10 mg by mouth daily in the early morning  , Disp: , Rfl:     LORazepam (ATIVAN) 0 5 mg tablet, Take 1 tablet (0 5 mg total) by mouth daily as needed for anxiety, Disp: 15 tablet, Rfl: 0    LOTEMAX 0 5 % OINT, APPLY A SMALL AMOUNT INTO BOTH EYES TID PRN, Disp: , Rfl: 1    potassium citrate (UROCIT-K) 10 mEq, potassium citrate ER 10 mEq (1,080 mg) tablet,extended release  TK 1 T PO BID, Disp: , Rfl:     ranitidine (ZANTAC) 150 mg tablet, Take 1 tablet (150 mg total) by mouth 2 (two) times a day, Disp: 60 tablet, Rfl: 11    rizatriptan (MAXALT) 10 MG tablet, Take 1 tablet (10 mg total) by mouth once as needed for migraine for up to 1 dose May repeat in 2 hours if needed, Disp: 9 tablet, Rfl: 0    SYNTHROID 75 MCG tablet, TAKE 1 TABLET(75 MCG) BY MOUTH DAILY EARLY MORNING, Disp: 30 tablet, Rfl: 0    naproxen (NAPROSYN) 500 mg tablet, Take 1 tablet (500 mg total) by mouth 2 (two) times a day with meals for 14 days Take with food, Disp: 28 tablet, Rfl: 0     Review of Systems   Constitutional: Negative for activity change, fatigue and fever  HENT: Negative for congestion, ear pain, sinus pain and sore throat  Eyes: Negative for pain and itching  Respiratory: Negative for cough and shortness of breath  Cardiovascular: Negative for chest pain and palpitations  Gastrointestinal: Negative for abdominal pain, constipation, diarrhea, nausea and vomiting  Endocrine: Negative for cold intolerance and heat intolerance  Genitourinary: Negative for dysuria  Musculoskeletal: Positive for arthralgias (left knee pain)  Negative for gait problem and myalgias  Skin: Negative for color change and rash  Neurological: Negative for dizziness, tingling, syncope, numbness and headaches  Hematological: Negative for adenopathy  Psychiatric/Behavioral: Negative for behavioral problems, dysphoric mood and sleep disturbance  The patient is not nervous/anxious  Objective:      /86 (BP Location: Left arm, Patient Position: Sitting, Cuff Size: Adult)   Pulse 86   Temp 98 5 °F (36 9 °C) (Tympanic)   Ht 5' 10" (1 778 m)   Wt 69 5 kg (153 lb 4 oz)   SpO2 98%   BMI 21 99 kg/m²          Physical Exam   Constitutional: She is oriented to person, place, and time  She appears well-developed and well-nourished  No distress  Body mass index is 21 99 kg/m²  HENT:   Head: Normocephalic and atraumatic     Right Ear: External ear normal    Left Ear: External ear normal    Nose: Nose normal    Mouth/Throat: Oropharynx is clear and moist    Eyes: Pupils are equal, round, and reactive to light  Conjunctivae and EOM are normal  No scleral icterus  Neck: Normal range of motion  Neck supple  No thyromegaly present  Cardiovascular: Normal rate, regular rhythm and normal heart sounds  No murmur heard  Pulmonary/Chest: Effort normal and breath sounds normal  No respiratory distress  She has no wheezes  Abdominal: Soft  Bowel sounds are normal  She exhibits no distension  There is no tenderness  Musculoskeletal: Normal range of motion  She exhibits no edema  Right knee: Normal         Left knee: She exhibits normal range of motion, no swelling, no effusion, no ecchymosis, no deformity, normal alignment, no LCL laxity, normal patellar mobility, no bony tenderness, normal meniscus and no MCL laxity  Tenderness found  Medial joint line (slight) tenderness noted  No lateral joint line, no MCL, no LCL and no patellar tendon tenderness noted  Lymphadenopathy:     She has no cervical adenopathy  Neurological: She is alert and oriented to person, place, and time  No cranial nerve deficit  Coordination normal    Skin: Skin is warm and dry  No rash noted  No erythema  No pallor  Psychiatric: She has a normal mood and affect  Her behavior is normal    Nursing note and vitals reviewed  Assessment/Plan:  Diagnoses and all orders for this visit:    Acute pain of left knee  -     XR knee 4+ vw left injury; Future  -     naproxen (NAPROSYN) 500 mg tablet; Take 1 tablet (500 mg total) by mouth 2 (two) times a day with meals for 14 days Take with food  -     Ambulatory referral to Physical Therapy;  Future  -     Reassurance given to patient that left knee exam appears grossly normal--recommend empiric treatment with twice daily naproxen for period of 2 weeks and follow up with physical therapy it discomfort persist   Left knee x-ray ordered for completeness  Will consider MRI if patient fails conservative therapy  Return if symptoms worsen or fail to improve  The patient indicates understanding of these issues and agrees with the plan          Tomma P[place], DO

## 2019-11-11 NOTE — LETTER
November 11, 2019     Patient: Maryse Bruce   YOB: 1970   Date of Visit: 11/11/2019       To Whom it May Concern:    Fariba Rehman is under my professional care  She was seen in my office on 11/11/2019  She may return to work on 11/11/19  If you have any questions or concerns, please don't hesitate to call           Sincerely,          Landon Snyder, DO        CC: No Recipients

## 2019-11-14 ENCOUNTER — HOSPITAL ENCOUNTER (OUTPATIENT)
Dept: RADIOLOGY | Facility: HOSPITAL | Age: 49
Discharge: HOME | End: 2019-11-14
Attending: FAMILY MEDICINE
Payer: COMMERCIAL

## 2019-11-14 DIAGNOSIS — M25.562 ACUTE PAIN OF LEFT KNEE: ICD-10-CM

## 2019-11-14 DIAGNOSIS — N20.0 NEPHROLITHIASIS: ICD-10-CM

## 2019-11-14 PROCEDURE — 73564 X-RAY EXAM KNEE 4 OR MORE: CPT

## 2019-11-14 RX ORDER — POTASSIUM CITRATE 10 MEQ/1
TABLET, EXTENDED RELEASE ORAL
Qty: 60 TABLET | Refills: 0 | Status: SHIPPED | OUTPATIENT
Start: 2019-11-14 | End: 2020-01-23 | Stop reason: SDUPTHER

## 2019-11-19 ENCOUNTER — TELEPHONE (OUTPATIENT)
Dept: FAMILY MEDICINE CLINIC | Facility: OTHER | Age: 49
End: 2019-11-19

## 2019-11-19 NOTE — TELEPHONE ENCOUNTER
Patient called wondering if her xray report was reviewed yet?  She would like a call back when read   Thank you

## 2019-11-19 NOTE — TELEPHONE ENCOUNTER
Please inform pt that XR result JUST became finalized  XR of knee was read as normal   Recommend she begin PT at this time  Thanks!   Maicol Rhodes, DO

## 2019-11-25 ENCOUNTER — OFFICE VISIT (OUTPATIENT)
Dept: FAMILY MEDICINE CLINIC | Facility: OTHER | Age: 49
End: 2019-11-25
Payer: COMMERCIAL

## 2019-11-25 VITALS
SYSTOLIC BLOOD PRESSURE: 122 MMHG | OXYGEN SATURATION: 98 % | BODY MASS INDEX: 21.9 KG/M2 | HEIGHT: 70 IN | TEMPERATURE: 97.5 F | HEART RATE: 94 BPM | DIASTOLIC BLOOD PRESSURE: 88 MMHG | WEIGHT: 153 LBS

## 2019-11-25 DIAGNOSIS — J01.00 ACUTE NON-RECURRENT MAXILLARY SINUSITIS: Primary | ICD-10-CM

## 2019-11-25 DIAGNOSIS — J02.9 SORE THROAT: ICD-10-CM

## 2019-11-25 LAB — S PYO AG THROAT QL: NEGATIVE

## 2019-11-25 PROCEDURE — 87880 STREP A ASSAY W/OPTIC: CPT | Performed by: FAMILY MEDICINE

## 2019-11-25 PROCEDURE — 1036F TOBACCO NON-USER: CPT | Performed by: FAMILY MEDICINE

## 2019-11-25 PROCEDURE — 99213 OFFICE O/P EST LOW 20 MIN: CPT | Performed by: FAMILY MEDICINE

## 2019-11-25 RX ORDER — AMOXICILLIN AND CLAVULANATE POTASSIUM 875; 125 MG/1; MG/1
1 TABLET, FILM COATED ORAL EVERY 12 HOURS SCHEDULED
Qty: 20 TABLET | Refills: 0 | Status: SHIPPED | OUTPATIENT
Start: 2019-11-25 | End: 2019-12-05

## 2019-11-25 NOTE — LETTER
November 25, 2019     Patient: Lilliana Smith   YOB: 1970   Date of Visit: 11/25/2019       To Whom it May Concern:    Alexandr Duong is under my professional care  She was seen in my office on 11/25/2019  She may return to work on 11/26/19 or when fever-free x 24 hrs  If you have any questions or concerns, please don't hesitate to call           Sincerely,          Paulette Villarreal DO        CC: No Recipients

## 2019-11-25 NOTE — PROGRESS NOTES
Subjective:      Patient ID: Han Manning is a 52 y o  female  URI    This is a new problem  Episode onset: 5-6 days ago  The problem has been gradually worsening  There has been no fever  Associated symptoms include congestion, coughing (intermittently productive with yellow sputum), headaches, a plugged ear sensation and a sore throat  Pertinent negatives include no abdominal pain, chest pain, diarrhea, dysuria, ear pain, joint pain, joint swelling, nausea, neck pain, rash, rhinorrhea, sinus pain, sneezing, swollen glands, vomiting or wheezing  Treatments tried: mucinex sinus  The treatment provided no relief  Multiple sick contacts ( worker)        The following portions of the patient's history were reviewed and updated as appropriate: allergies, current medications, past family history, past medical history, past social history, past surgical history and problem list       Current Outpatient Medications:     Elastic Bandages & Supports ([place]) MISC, by Does not apply route daily 15-20mmHg, Disp: 2 each, Rfl: 4    FLUoxetine (PROzac) 40 MG capsule, Take 1 capsule (40 mg total) by mouth daily, Disp: 90 capsule, Rfl: 1    fluticasone (FLONASE) 50 mcg/act nasal spray, 1 spray into each nostril daily, Disp: , Rfl:     ibuprofen (MOTRIN) 200 mg tablet, Take 3 tablets (600 mg total) by mouth every 6 (six) hours as needed for mild pain, Disp: , Rfl: 0    loratadine (CLARITIN) 10 mg tablet, Take 10 mg by mouth daily in the early morning  , Disp: , Rfl:     LORazepam (ATIVAN) 0 5 mg tablet, Take 1 tablet (0 5 mg total) by mouth daily as needed for anxiety, Disp: 15 tablet, Rfl: 0    LOTEMAX 0 5 % OINT, APPLY A SMALL AMOUNT INTO BOTH EYES TID PRN, Disp: , Rfl: 1    naproxen (NAPROSYN) 500 mg tablet, Take 1 tablet (500 mg total) by mouth 2 (two) times a day with meals for 14 days Take with food, Disp: 28 tablet, Rfl: 0    potassium citrate (UROCIT-K) 10 mEq, potassium citrate ER 10 mEq (1,080 mg) tablet,extended release  TK 1 T PO BID, Disp: , Rfl:     potassium citrate (UROCIT-K) 10 mEq, TAKE 1 TABLET BY MOUTH TWICE DAILY, Disp: 60 tablet, Rfl: 0    ranitidine (ZANTAC) 150 mg tablet, Take 1 tablet (150 mg total) by mouth 2 (two) times a day, Disp: 60 tablet, Rfl: 11    rizatriptan (MAXALT) 10 MG tablet, Take 1 tablet (10 mg total) by mouth once as needed for migraine for up to 1 dose May repeat in 2 hours if needed, Disp: 9 tablet, Rfl: 0    SYNTHROID 75 MCG tablet, TAKE 1 TABLET(75 MCG) BY MOUTH DAILY EARLY MORNING, Disp: 30 tablet, Rfl: 0    amoxicillin-clavulanate (AUGMENTIN) 875-125 mg per tablet, Take 1 tablet by mouth every 12 (twelve) hours for 10 days, Disp: 20 tablet, Rfl: 0       Review of Systems   Constitutional: Positive for fatigue  Negative for appetite change and fever  HENT: Positive for congestion, postnasal drip, sinus pressure and sore throat  Negative for drooling, ear pain, hearing loss, rhinorrhea, sinus pain, sneezing and tinnitus  Eyes: Negative for pain, discharge, redness and itching  Respiratory: Positive for cough (intermittently productive with yellow sputum)  Negative for chest tightness, shortness of breath and wheezing  Cardiovascular: Negative for chest pain, palpitations and leg swelling  Gastrointestinal: Negative for abdominal distention, abdominal pain, diarrhea, nausea and vomiting  Genitourinary: Negative for difficulty urinating, dysuria, flank pain, frequency and urgency  Musculoskeletal: Negative for arthralgias, back pain, joint pain, myalgias and neck pain  Skin: Negative for color change and rash  Neurological: Positive for headaches  Negative for dizziness, syncope and weakness  Hematological: Negative for adenopathy  Psychiatric/Behavioral: Negative for confusion, dysphoric mood and self-injury  The patient is not nervous/anxious            Objective:      /88 (BP Location: Left arm, Patient Position: Sitting, Cuff Size: Adult)   Pulse 94   Temp 97 5 °F (36 4 °C) (Tympanic)   Ht 5' 10" (1 778 m)   Wt 69 4 kg (153 lb)   SpO2 98%   BMI 21 95 kg/m²          Physical Exam   Constitutional: She is oriented to person, place, and time  She appears well-developed and well-nourished  No distress  Body mass index is 21 95 kg/m²  HENT:   Head: Normocephalic and atraumatic  Right Ear: Hearing, external ear and ear canal normal  Tympanic membrane is retracted  Left Ear: Hearing, external ear and ear canal normal  Tympanic membrane is retracted  Nose: Mucosal edema present  Right sinus exhibits maxillary sinus tenderness  Right sinus exhibits no frontal sinus tenderness  Left sinus exhibits maxillary sinus tenderness  Left sinus exhibits no frontal sinus tenderness  Mouth/Throat: Uvula is midline  Oropharyngeal exudate and posterior oropharyngeal erythema present  Eyes: Pupils are equal, round, and reactive to light  Conjunctivae and EOM are normal  Right eye exhibits no discharge  Left eye exhibits no discharge  No scleral icterus  Neck: Normal range of motion  Neck supple  No thyromegaly present  Cardiovascular: Normal rate, regular rhythm and normal heart sounds  No murmur heard  Pulmonary/Chest: Effort normal and breath sounds normal  No respiratory distress  She has no wheezes  Abdominal: Soft  Bowel sounds are normal  She exhibits no distension  There is no tenderness  Musculoskeletal: Normal range of motion  She exhibits no edema, tenderness or deformity  Lymphadenopathy:     She has no cervical adenopathy  Neurological: She is alert and oriented to person, place, and time  She has normal reflexes  No cranial nerve deficit  Coordination normal    Skin: Skin is warm and dry  No rash noted  No erythema  No pallor  Psychiatric: She has a normal mood and affect  Her behavior is normal    Nursing note and vitals reviewed            Assessment/Plan:  Diagnoses and all orders for this visit:    Acute non-recurrent maxillary sinusitis  -     amoxicillin-clavulanate (AUGMENTIN) 875-125 mg per tablet; Take 1 tablet by mouth every 12 (twelve) hours for 10 days  -     Empirically tx with abx x 10 days; Recommend supportive care with salt water gargles and saline nasal spray  Sore throat  -     POCT rapid strepA == Negative        Return if symptoms worsen or fail to improve  The patient indicates understanding of these issues and agrees with the plan            Asad Kruse, DO

## 2020-01-03 ENCOUNTER — HOSPITAL ENCOUNTER (OUTPATIENT)
Dept: NON INVASIVE DIAGNOSTICS | Facility: CLINIC | Age: 50
Discharge: HOME | End: 2020-01-03
Payer: COMMERCIAL

## 2020-01-03 DIAGNOSIS — I83.812 VARICOSE VEINS OF LEG WITH PAIN, LEFT: ICD-10-CM

## 2020-01-03 PROCEDURE — 93970 EXTREMITY STUDY: CPT | Performed by: SURGERY

## 2020-01-03 PROCEDURE — 93970 EXTREMITY STUDY: CPT

## 2020-01-07 DIAGNOSIS — F41.1 GENERALIZED ANXIETY DISORDER: ICD-10-CM

## 2020-01-07 RX ORDER — FLUOXETINE HYDROCHLORIDE 40 MG/1
CAPSULE ORAL
Qty: 90 CAPSULE | Refills: 0 | Status: SHIPPED | OUTPATIENT
Start: 2020-01-07 | End: 2020-04-07 | Stop reason: SDUPTHER

## 2020-01-17 ENCOUNTER — OFFICE VISIT (OUTPATIENT)
Dept: URGENT CARE | Age: 50
End: 2020-01-17
Payer: COMMERCIAL

## 2020-01-17 VITALS
HEART RATE: 103 BPM | OXYGEN SATURATION: 95 % | SYSTOLIC BLOOD PRESSURE: 132 MMHG | DIASTOLIC BLOOD PRESSURE: 83 MMHG | TEMPERATURE: 98.6 F | BODY MASS INDEX: 21.9 KG/M2 | WEIGHT: 153 LBS | RESPIRATION RATE: 16 BRPM | HEIGHT: 70 IN

## 2020-01-17 DIAGNOSIS — J20.9 ACUTE BRONCHITIS, UNSPECIFIED ORGANISM: ICD-10-CM

## 2020-01-17 DIAGNOSIS — J06.9 ACUTE UPPER RESPIRATORY INFECTION: Primary | ICD-10-CM

## 2020-01-17 DIAGNOSIS — J01.00 ACUTE NON-RECURRENT MAXILLARY SINUSITIS: ICD-10-CM

## 2020-01-17 PROCEDURE — G0382 LEV 3 HOSP TYPE B ED VISIT: HCPCS | Performed by: FAMILY MEDICINE

## 2020-01-17 RX ORDER — AMOXICILLIN AND CLAVULANATE POTASSIUM 875; 125 MG/1; MG/1
1 TABLET, FILM COATED ORAL EVERY 12 HOURS SCHEDULED
Qty: 20 TABLET | Refills: 0 | Status: SHIPPED | OUTPATIENT
Start: 2020-01-17 | End: 2020-01-27

## 2020-01-18 NOTE — PATIENT INSTRUCTIONS
Rest, limit activity  Augmentin twice a day until finished (please take probiotics)  Continue taking Claritin, and using Flonase nasal spray 1 spray in each nostril once or twice a day)  Cold/cough medication as needed  Tylenol, or ibuprofen (Advil/Motrin) as needed  Recheck/follow-up with family physician as needed  Please go to the hospital emergency department if needed

## 2020-01-18 NOTE — PROGRESS NOTES
[facility]        NAME: Joel Barraza is a 52 y o  female  : 1970    MRN: [id number]  DATE: 2020  TIME: 7:07 PM    Assessment and Plan   Acute upper respiratory infection [J06 9]  1  Acute upper respiratory infection     2  Acute non-recurrent maxillary sinusitis  amoxicillin-clavulanate (AUGMENTIN) 875-125 mg per tablet   3  Acute bronchitis, unspecified organism           Patient Instructions     Patient Instructions   Rest, limit activity  Augmentin twice a day until finished (please take probiotics)  Continue taking Claritin, and using Flonase nasal spray 1 spray in each nostril once or twice a day)  Cold/cough medication as needed  Tylenol, or ibuprofen (Advil/Motrin) as needed  Recheck/follow-up with family physician as needed  Please go to the hospital emergency department if needed  Follow up with PCP in 3-5 days  Proceed to  ER if symptoms worsen  Chief Complaint     Chief Complaint   Patient presents with    Cough     Pt complaining of cough, congestion and chest tightness x3 days  Has tried claritin and flonase  History of Present Illness       Congestion, cough, chest tightness; patient states she has been taking Claritin, and using Flonase nasal spray      Review of Systems   Review of Systems   HENT: Positive for congestion  Respiratory: Positive for cough and chest tightness  Cardiovascular: Negative  Musculoskeletal: Negative  Skin: Negative  Neurological: Negative            Current Medications       Current Outpatient Medications:     FLUoxetine (PROzac) 40 MG capsule, TAKE 1 CAPSULE(40 MG) BY MOUTH DAILY, Disp: 90 capsule, Rfl: 0    fluticasone (FLONASE) 50 mcg/act nasal spray, 1 spray into each nostril daily, Disp: , Rfl:     loratadine (CLARITIN) 10 mg tablet, Take 10 mg by mouth daily in the early morning  , Disp: , Rfl:     LORazepam (ATIVAN) 0 5 mg tablet, Take 1 tablet (0 5 mg total) by mouth daily as needed for anxiety, Disp: 15 tablet, Rfl: 0    LOTEMAX 0 5 % OINT, APPLY A SMALL AMOUNT INTO BOTH EYES TID PRN, Disp: , Rfl: 1    potassium citrate (UROCIT-K) 10 mEq, potassium citrate ER 10 mEq (1,080 mg) tablet,extended release  TK 1 T PO BID, Disp: , Rfl:     ranitidine (ZANTAC) 150 mg tablet, Take 1 tablet (150 mg total) by mouth 2 (two) times a day, Disp: 60 tablet, Rfl: 11    rizatriptan (MAXALT) 10 MG tablet, Take 1 tablet (10 mg total) by mouth once as needed for migraine for up to 1 dose May repeat in 2 hours if needed, Disp: 9 tablet, Rfl: 0    SYNTHROID 75 MCG tablet, TAKE 1 TABLET(75 MCG) BY MOUTH DAILY EARLY MORNING, Disp: 30 tablet, Rfl: 0    amoxicillin-clavulanate (AUGMENTIN) 875-125 mg per tablet, Take 1 tablet by mouth every 12 (twelve) hours for 20 doses, Disp: 20 tablet, Rfl: 0    Elastic Bandages & Supports (MEDICAL COMPRESSION PANTYHOSE) MISC, by Does not apply route daily 15-20mmHg, Disp: 2 each, Rfl: 4    ibuprofen (MOTRIN) 200 mg tablet, Take 3 tablets (600 mg total) by mouth every 6 (six) hours as needed for mild pain (Patient not taking: Reported on 1/17/2020), Disp: , Rfl: 0    naproxen (NAPROSYN) 500 mg tablet, Take 1 tablet (500 mg total) by mouth 2 (two) times a day with meals for 14 days Take with food, Disp: 28 tablet, Rfl: 0    potassium citrate (UROCIT-K) 10 mEq, TAKE 1 TABLET BY MOUTH TWICE DAILY, Disp: 60 tablet, Rfl: 0    Current Allergies     Allergies as of 01/17/2020 - Reviewed 01/17/2020   Allergen Reaction Noted    Latex Rash 12/22/2017    Biaxin [clarithromycin] Diarrhea 12/22/2017    Cefzil [cefprozil] Dizziness 12/22/2017    Flagyl [metronidazole] Nausea Only 12/22/2017    Percocet [oxycodone-acetaminophen] Nausea Only 12/22/2017            The following portions of the patient's history were reviewed and updated as appropriate: allergies, current medications, past family history, past medical history, past social history, past surgical history and problem list  Past Medical History:   Diagnosis Date    Anxiety     Chronic otitis externa     Last assessed 12/18/2017    Disease of thyroid gland     GERD (gastroesophageal reflux disease)     Hyperlipidemia     Kidney stone     Migraines     Miscarriage        Past Surgical History:   Procedure Laterality Date    DILATION AND CURETTAGE OF UTERUS      for SAB     EAR SURGERY      age 6    [place] RETROGRADE PYELOGRAM  1/11/2019    [place] CYSTO/URETERO W/LITHOTRIPSY &INDWELL STENT INSRT Left 1/11/2019    Procedure: CYSTOSCOPY URETEROSCOPY WITH LITHOTRIPSY HOLMIUM LASER, RETROGRADE PYELOGRAM AND INSERTION STENT URETERAL;  Surgeon: Alli Garcia MD;  [facility];  Service: Urology    CA ESOPHAGOGASTRODUODENOSCOPY TRANSORAL DIAGNOSTIC N/A 12/27/2017    Procedure: ESOPHAGOGASTRODUODENOSCOPY (EGD); Surgeon: Kevin Resendez MD;  [facility]; Service: Gastroenterology    TUBAL LIGATION      VEIN LIGATION AND STRIPPING Bilateral     WISDOM TOOTH EXTRACTION         Family History   Problem Relation Age of Onset    Hypertension Mother     Hypertension Father     Hyperlipidemia Father     No Known Problems Sister     Asthma Daughter     Autism Daughter     Anxiety disorder Daughter     Asthma Maternal Grandmother     No Known Problems Maternal Grandfather     No Known Problems Paternal Grandmother     No Known Problems Paternal Grandfather     Asthma Daughter     Asthma Daughter          Medications have been verified  Objective   /83 (BP Location: Left arm, Patient Position: Sitting)   Pulse 103   Temp 98 6 °F (37 °C) (Temporal)   Resp 16   Ht 5' 10" (1 778 m)   Wt 69 4 kg (153 lb)   SpO2 95%   BMI 21 95 kg/m²        Physical Exam     Physical Exam   Constitutional: She is oriented to person, place, and time  She appears well-developed and well-nourished     HENT:   Right Ear: External ear normal    Left Ear: External ear normal    Nasal congestion; discomfort over the lateral maxillary sinuses bilaterally; injection of the oropharynx   Neck: Normal range of motion  Neck supple  Cardiovascular: Normal rate, regular rhythm and normal heart sounds  Pulmonary/Chest: Effort normal  No respiratory distress  She has no wheezes  Coarse breath sounds   Neurological: She is alert and oriented to person, place, and time  No nuchal rigidity   Skin:   Good color and turgor   Psychiatric: She has a normal mood and affect  Her behavior is normal    Nursing note and vitals reviewed

## 2020-01-23 ENCOUNTER — OFFICE VISIT (OUTPATIENT)
Dept: VASCULAR SURGERY | Facility: CLINIC | Age: 50
End: 2020-01-23
Payer: COMMERCIAL

## 2020-01-23 VITALS
HEART RATE: 88 BPM | HEIGHT: 70 IN | TEMPERATURE: 98 F | WEIGHT: 154 LBS | SYSTOLIC BLOOD PRESSURE: 144 MMHG | DIASTOLIC BLOOD PRESSURE: 104 MMHG | BODY MASS INDEX: 22.05 KG/M2

## 2020-01-23 DIAGNOSIS — I83.812 VARICOSE VEINS OF LEG WITH PAIN, LEFT: Primary | ICD-10-CM

## 2020-01-23 PROCEDURE — 99213 OFFICE O/P EST LOW 20 MIN: CPT | Performed by: SURGERY

## 2020-01-23 NOTE — PATIENT INSTRUCTIONS
Complaining of left lower leg and foot pain and was recently dilators to with plantar fasciitis  She underwent successful laser ablation of her saphenous veins in 2012 and review of her recent reflux study shows successful ablation with closure of both saphenous veins  She has small secondary varicosities which I believe are not symptomatic      Plan:  Follow-up in the office on an as-needed basis I recommended that she continue to wear custom graduated compression stockings knee-high into the future and prescription has been given to her today

## 2020-01-23 NOTE — LETTER
January 23, 2020     Emilia Borja,   [id number]    Patient: Giulia Rader   YOB: 1970   Date of Visit: 1/23/2020       Dear Dr Lashwan Caro:    Thank you for referring Jarrett Beltran to me for evaluation  Below are my notes for this consultation  If you have questions, please do not hesitate to call me  I look forward to following your patient along with you           Sincerely,        Gladis Zhou MD        CC: No Recipients

## 2020-01-23 NOTE — PROGRESS NOTES
Assessment/Plan:    Varicose veins of leg with pain, left  Complaining of left lower leg and foot pain and was recently dilators to with plantar fasciitis  She underwent successful laser ablation of her saphenous veins in 2012 and review of her recent reflux study shows successful ablation with closure of both saphenous veins  She has small secondary varicosities which I believe are not symptomatic  Plan:  Follow-up in the office on an as-needed basis I recommended that she continue to wear custom graduated compression stockings knee-high into the future and prescription has been given to her today       Diagnoses and all orders for this visit:    Varicose veins of leg with pain, left        Subjective:      Patient ID: Coby Rodrigez is a 52 y o  female  HPI history of bilateral leg ablation [place]  had developed left lower leg and foot pain which was diagnosed with plantar fasciitis  Her venous reflux study showed successful ablation of both saphenous veins she has developed secondary varicosities which are small and unlikely to be the cause of her discomfort    The following portions of the patient's history were reviewed and updated as appropriate: allergies, current medications, past family history, past medical history, past social history, past surgical history and problem list     Review of Systems  plantar fasciitis, superficial varicosities, no GI  symptoms no constitutional symptoms or skin rash all other systems negative    Objective:      BP (!) 144/104 (BP Location: Right arm, Patient Position: Sitting)   Pulse 88   Temp 98 °F (36 7 °C) (Tympanic)   Ht 5' 10" (1 778 m)   Wt 69 9 kg (154 lb)   BMI 22 10 kg/m²          Physical Exam      Upon standing she has small varicosities in the left medial calf which are will 2-3 millimeters  No swelling or stasis dermatitis or superficial phlebitis      I have reviewed and made appropriate changes to the review of systems input by the medical assistant  Vitals:    01/23/20 1559   BP: (!) 144/104   BP Location: Right arm   Patient Position: Sitting   Pulse: 88   Temp: 98 °F (36 7 °C)   TempSrc: Tympanic   Weight: 69 9 kg (154 lb)   Height: 5' 10" (1 778 m)       Patient Active Problem List   Diagnosis    Influenza    Allergic rhinitis    Anxiety disorder    Early menopause    Gastroesophageal reflux disease without esophagitis    Hyperlipidemia    Rhus dermatitis    Migraines    Lower back pain    Lactose intolerance    Hypothyroidism due to acquired atrophy of thyroid    Sprain of ulnar collateral ligament of left elbow    Nephrolithiasis    Abnormal CT scan    Chronic idiopathic constipation    Varicose veins of leg with pain, left    Asymptomatic varicose veins    Pain in soft tissues of limb    Pruritic disorder    Rash and other nonspecific skin eruption       Past Surgical History:   Procedure Laterality Date    DILATION AND CURETTAGE OF UTERUS      for SAB     EAR SURGERY      age 6    [facility] RETROGRADE PYELOGRAM  1/11/2019    [place] CYSTO/URETERO W/LITHOTRIPSY &INDWELL STENT INSRT Left 1/11/2019    Procedure: CYSTOSCOPY URETEROSCOPY WITH LITHOTRIPSY HOLMIUM LASER, RETROGRADE PYELOGRAM AND INSERTION STENT URETERAL;  Surgeon: Tg Prakash MD;  [facility];  Service: Urology    [place] ESOPHAGOGASTRODUODENOSCOPY TRANSORAL DIAGNOSTIC N/A 12/27/2017    Procedure: ESOPHAGOGASTRODUODENOSCOPY (EGD); Surgeon: Randy Samuel MD;  [facility];   Service: Gastroenterology    TUBAL LIGATION      VEIN LIGATION AND STRIPPING Bilateral     WISDOM TOOTH EXTRACTION         Family History   Problem Relation Age of Onset    Hypertension Mother     Hypertension Father     Hyperlipidemia Father     No Known Problems Sister     Asthma Daughter     Autism Daughter     Anxiety disorder Daughter     Asthma Maternal Grandmother     No Known Problems Maternal Grandfather     No Known Problems Paternal Grandmother    [facility] No Known Problems Paternal Grandfather     Asthma Daughter     Asthma Daughter        Social History     Socioeconomic History    Marital status: /Civil Union     Spouse name: Not on file    Number of children: Not on file    Years of education: 15    Highest education level: Not on file   Occupational History     Employer: [address] Financial resource strain: Not on file    Food insecurity:     Worry: Not on file     Inability: Not on file    Transportation needs:     Medical: Not on file     Non-medical: Not on file   Tobacco Use    Smoking status: Never Smoker    Smokeless tobacco: Never Used   Substance and Sexual Activity    Alcohol use: No    Drug use: Never    Sexual activity: Yes     Partners: Male     Birth control/protection: Post-menopausal, None   Lifestyle    Physical activity:     Days per week: Not on file     Minutes per session: Not on file    Stress: Not on file   Relationships    Social connections:     Talks on phone: Not on file     Gets together: Not on file     Attends Synagogue service: Not on file     Active member of club or organization: Not on file     Attends meetings of clubs or organizations: Not on file     Relationship status: Not on file    Intimate partner violence:     Fear of current or ex partner: Not on file     Emotionally abused: Not on file     Physically abused: Not on file     Forced sexual activity: Not on file   Other Topics Concern    Not on file   Social History Narrative    Does not exercise       Allergies   Allergen Reactions    Latex Rash     Where touch with Latex   Allergic to Kiwi and bananas    Biaxin [Clarithromycin] Diarrhea    Cefzil [Cefprozil] Dizziness    Flagyl [Metronidazole] Nausea Only    Percocet [Oxycodone-Acetaminophen] Nausea Only         Current Outpatient Medications:     amoxicillin-clavulanate (AUGMENTIN) 875-125 mg per tablet, Take 1 tablet by mouth every 12 (twelve) hours for 20 doses, Disp: 20 tablet, Rfl: 0    Elastic Bandages & Supports (MEDICAL COMPRESSION PANTYHOSE) MISC, by Does not apply route daily 15-20mmHg, Disp: 2 each, Rfl: 4    FLUoxetine (PROzac) 40 MG capsule, TAKE 1 CAPSULE(40 MG) BY MOUTH DAILY, Disp: 90 capsule, Rfl: 0    fluticasone (FLONASE) 50 mcg/act nasal spray, 1 spray into each nostril daily, Disp: , Rfl:     ibuprofen (MOTRIN) 200 mg tablet, Take 3 tablets (600 mg total) by mouth every 6 (six) hours as needed for mild pain, Disp: , Rfl: 0    loratadine (CLARITIN) 10 mg tablet, Take 10 mg by mouth daily in the early morning  , Disp: , Rfl:     LORazepam (ATIVAN) 0 5 mg tablet, Take 1 tablet (0 5 mg total) by mouth daily as needed for anxiety, Disp: 15 tablet, Rfl: 0    LOTEMAX 0 5 % OINT, APPLY A SMALL AMOUNT INTO BOTH EYES TID PRN, Disp: , Rfl: 1    naproxen (NAPROSYN) 500 mg tablet, Take 1 tablet (500 mg total) by mouth 2 (two) times a day with meals for 14 days Take with food, Disp: 28 tablet, Rfl: 0    potassium citrate (UROCIT-K) 10 mEq, potassium citrate ER 10 mEq (1,080 mg) tablet,extended release  TK 1 T PO BID, Disp: , Rfl:     ranitidine (ZANTAC) 150 mg tablet, Take 1 tablet (150 mg total) by mouth 2 (two) times a day, Disp: 60 tablet, Rfl: 11    rizatriptan (MAXALT) 10 MG tablet, Take 1 tablet (10 mg total) by mouth once as needed for migraine for up to 1 dose May repeat in 2 hours if needed, Disp: 9 tablet, Rfl: 0    SYNTHROID 75 MCG tablet, TAKE 1 TABLET(75 MCG) BY MOUTH DAILY EARLY MORNING, Disp: 30 tablet, Rfl: 0

## 2020-01-29 ENCOUNTER — OFFICE VISIT (OUTPATIENT)
Dept: URGENT CARE | Age: 50
End: 2020-01-29
Payer: COMMERCIAL

## 2020-01-29 VITALS
TEMPERATURE: 97.9 F | BODY MASS INDEX: 21.76 KG/M2 | OXYGEN SATURATION: 96 % | RESPIRATION RATE: 16 BRPM | HEART RATE: 100 BPM | HEIGHT: 70 IN | SYSTOLIC BLOOD PRESSURE: 114 MMHG | WEIGHT: 152 LBS | DIASTOLIC BLOOD PRESSURE: 81 MMHG

## 2020-01-29 DIAGNOSIS — J01.00 ACUTE NON-RECURRENT MAXILLARY SINUSITIS: Primary | ICD-10-CM

## 2020-01-29 PROCEDURE — G0382 LEV 3 HOSP TYPE B ED VISIT: HCPCS | Performed by: PHYSICIAN ASSISTANT

## 2020-01-29 RX ORDER — DOXYCYCLINE 100 MG/1
100 TABLET ORAL 2 TIMES DAILY
Qty: 14 TABLET | Refills: 0 | Status: SHIPPED | OUTPATIENT
Start: 2020-01-29 | End: 2020-02-05

## 2020-01-29 NOTE — PATIENT INSTRUCTIONS
Start antibiotics  -over-the-counter medications  -Tylenol Motrin  -follow-up with PCP  -ER symptoms worsen

## 2020-01-29 NOTE — LETTER
January 29, 2020     Patient: Renetta Jha   YOB: 1970   Date of Visit: 1/29/2020       To Whom it May Concern:    Noe Hernandez is under my professional care  She was seen in my office on 1/29/2020  Please excuse from work on 1/29 She may return to work on 1/30  If you have any questions or concerns, please don't hesitate to call           Sincerely,          Gilford Basset, PA-C        CC: No Recipients

## 2020-01-29 NOTE — PROGRESS NOTES
[facility]        NAME: Lilliana Smith is a 52 y o  female  : 1970    MRN: [id number]  DATE: 2020  TIME: 11:12 AM    Assessment and Plan   Acute non-recurrent maxillary sinusitis [J01 00]  1  Acute non-recurrent maxillary sinusitis  doxycycline (ADOXA) 100 MG tablet         Patient Instructions     Start antibiotics  -over-the-counter medications  -Tylenol Motrin  Follow up with PCP in 3-5 days  Proceed to  ER if symptoms worsen  Chief Complaint     Chief Complaint   Patient presents with    Cold Like Symptoms     Pt complaining of cough, congestion, headache and low grade x2 weeks  She was here on  and treated for a sinus infection with Augmentin, but is not feeling better  History of Present Illness       Patient presents with sinus pain and pressure, headaches and cough  She states she was just treated for sinus infection with Augmentin but states that did not help  Review of Systems   Review of Systems   Constitutional: Negative  HENT: Positive for congestion, sinus pressure and sinus pain  Negative for sore throat  Respiratory: Positive for cough  Cardiovascular: Negative  Gastrointestinal: Negative  Neurological: Negative  Psychiatric/Behavioral: Negative            Current Medications       Current Outpatient Medications:     Elastic Bandages & Supports (MEDICAL COMPRESSION PANTYHOSE) MISC, by Does not apply route daily 15-20mmHg, Disp: 2 each, Rfl: 4    FLUoxetine (PROzac) 40 MG capsule, TAKE 1 CAPSULE(40 MG) BY MOUTH DAILY, Disp: 90 capsule, Rfl: 0    fluticasone (FLONASE) 50 mcg/act nasal spray, 1 spray into each nostril daily, Disp: , Rfl:     ibuprofen (MOTRIN) 200 mg tablet, Take 3 tablets (600 mg total) by mouth every 6 (six) hours as needed for mild pain, Disp: , Rfl: 0    loratadine (CLARITIN) 10 mg tablet, Take 10 mg by mouth daily in the early morning  , Disp: , Rfl:     LORazepam (ATIVAN) 0 5 mg tablet, Take 1 tablet (0 5 mg total) by mouth daily as needed for anxiety, Disp: 15 tablet, Rfl: 0    LOTEMAX 0 5 % OINT, APPLY A SMALL AMOUNT INTO BOTH EYES TID PRN, Disp: , Rfl: 1    potassium citrate (UROCIT-K) 10 mEq, potassium citrate ER 10 mEq (1,080 mg) tablet,extended release  TK 1 T PO BID, Disp: , Rfl:     ranitidine (ZANTAC) 150 mg tablet, Take 1 tablet (150 mg total) by mouth 2 (two) times a day, Disp: 60 tablet, Rfl: 11    rizatriptan (MAXALT) 10 MG tablet, Take 1 tablet (10 mg total) by mouth once as needed for migraine for up to 1 dose May repeat in 2 hours if needed, Disp: 9 tablet, Rfl: 0    SYNTHROID 75 MCG tablet, TAKE 1 TABLET(75 MCG) BY MOUTH DAILY EARLY MORNING, Disp: 30 tablet, Rfl: 0    doxycycline (ADOXA) 100 MG tablet, Take 1 tablet (100 mg total) by mouth 2 (two) times a day for 7 days, Disp: 14 tablet, Rfl: 0    naproxen (NAPROSYN) 500 mg tablet, Take 1 tablet (500 mg total) by mouth 2 (two) times a day with meals for 14 days Take with food, Disp: 28 tablet, Rfl: 0    Current Allergies     Allergies as of 01/29/2020 - Reviewed 01/29/2020   Allergen Reaction Noted    Latex Rash 12/22/2017    Biaxin [clarithromycin] Diarrhea 12/22/2017    Cefzil [cefprozil] Dizziness 12/22/2017    Flagyl [metronidazole] Nausea Only 12/22/2017    Percocet [oxycodone-acetaminophen] Nausea Only 12/22/2017            The following portions of the patient's history were reviewed and updated as appropriate: allergies, current medications, past family history, past medical history, past social history, past surgical history and problem list      Past Medical History:   Diagnosis Date    Anxiety     Chronic otitis externa     Last assessed 12/18/2017    Disease of thyroid gland     GERD (gastroesophageal reflux disease)     Hyperlipidemia     Kidney stone     Migraines     Miscarriage        Past Surgical History:   Procedure Laterality Date    DILATION AND CURETTAGE OF UTERUS      for SAB     EAR SURGERY      age 8    [place] RETROGRADE PYELOGRAM  1/11/2019    [place] CYSTO/URETERO W/LITHOTRIPSY &INDWELL STENT INSRT Left 1/11/2019    Procedure: CYSTOSCOPY URETEROSCOPY WITH LITHOTRIPSY HOLMIUM LASER, RETROGRADE PYELOGRAM AND INSERTION STENT URETERAL;  Surgeon: Elton Ocampo MD;  [facility];  Service: Urology    [place] ESOPHAGOGASTRODUODENOSCOPY TRANSORAL DIAGNOSTIC N/A 12/27/2017    Procedure: ESOPHAGOGASTRODUODENOSCOPY (EGD); Surgeon: Harry Walsh MD;  [facility]; Service: Gastroenterology    TUBAL LIGATION      VEIN LIGATION AND STRIPPING Bilateral     WISDOM TOOTH EXTRACTION         Family History   Problem Relation Age of Onset    Hypertension Mother     Hypertension Father     Hyperlipidemia Father     No Known Problems Sister     Asthma Daughter     Autism Daughter     Anxiety disorder Daughter     Asthma Maternal Grandmother     No Known Problems Maternal Grandfather     No Known Problems Paternal Grandmother     No Known Problems Paternal Grandfather     Asthma Daughter     Asthma Daughter          Medications have been verified  Objective   /81 (BP Location: Left arm, Patient Position: Sitting)   Pulse 100   Temp 97 9 °F (36 6 °C) (Temporal)   Resp 16   Ht 5' 10" (1 778 m)   Wt 68 9 kg (152 lb)   SpO2 96%   BMI 21 81 kg/m²        Physical Exam     Physical Exam   Constitutional: She is oriented to person, place, and time  She appears well-developed and well-nourished  No distress  HENT:   Head: Normocephalic and atraumatic  Right Ear: External ear normal    Left Ear: External ear normal    Nose: Nose normal    Mouth/Throat: Oropharynx is clear and moist  No oropharyngeal exudate  Eyes: Pupils are equal, round, and reactive to light  EOM are normal    Cardiovascular: Normal rate, regular rhythm and normal heart sounds  Pulmonary/Chest: Effort normal and breath sounds normal    Neurological: She is alert and oriented to person, place, and time     Skin: Skin is warm and dry  She is not diaphoretic  Psychiatric: She has a normal mood and affect  Her behavior is normal    Nursing note and vitals reviewed

## 2020-02-03 ENCOUNTER — TRANSCRIBE ORDERS (OUTPATIENT)
Dept: ADMINISTRATIVE | Facility: HOSPITAL | Age: 50
End: 2020-02-03

## 2020-02-03 DIAGNOSIS — Z12.31 ENCOUNTER FOR SCREENING MAMMOGRAM FOR BREAST CANCER: Primary | ICD-10-CM

## 2020-04-06 ENCOUNTER — TELEPHONE (OUTPATIENT)
Dept: UROLOGY | Facility: CLINIC | Age: 50
End: 2020-04-06

## 2020-04-07 DIAGNOSIS — F41.1 GENERALIZED ANXIETY DISORDER: ICD-10-CM

## 2020-04-07 RX ORDER — FLUOXETINE HYDROCHLORIDE 40 MG/1
40 CAPSULE ORAL DAILY
Qty: 90 CAPSULE | Refills: 1 | Status: SHIPPED | OUTPATIENT
Start: 2020-04-07

## 2020-04-15 DIAGNOSIS — K21.9 GASTROESOPHAGEAL REFLUX DISEASE WITHOUT ESOPHAGITIS: ICD-10-CM

## 2020-04-21 RX ORDER — RANITIDINE 150 MG/1
TABLET ORAL
Qty: 60 TABLET | Refills: 11 | Status: SHIPPED | OUTPATIENT
Start: 2020-04-21 | End: 2020-05-04 | Stop reason: RX

## 2020-05-04 ENCOUNTER — TELEPHONE (OUTPATIENT)
Dept: GASTROENTEROLOGY | Facility: AMBULARY SURGERY CENTER | Age: 50
End: 2020-05-04

## 2020-05-04 DIAGNOSIS — K21.9 GASTROESOPHAGEAL REFLUX DISEASE WITHOUT ESOPHAGITIS: Primary | ICD-10-CM

## 2020-05-04 RX ORDER — FAMOTIDINE 20 MG/1
20 TABLET, FILM COATED ORAL 2 TIMES DAILY
Qty: 60 TABLET | Refills: 3 | Status: SHIPPED | OUTPATIENT
Start: 2020-05-04 | End: 2020-08-24

## 2020-05-20 ENCOUNTER — HOSPITAL ENCOUNTER (OUTPATIENT)
Dept: MAMMOGRAPHY | Facility: HOSPITAL | Age: 50
Discharge: HOME | End: 2020-05-20
Attending: OBSTETRICS & GYNECOLOGY
Payer: COMMERCIAL

## 2020-05-20 VITALS — WEIGHT: 152 LBS | HEIGHT: 70 IN | BODY MASS INDEX: 21.76 KG/M2

## 2020-05-20 DIAGNOSIS — Z12.31 ENCOUNTER FOR SCREENING MAMMOGRAM FOR BREAST CANCER: ICD-10-CM

## 2020-05-20 PROCEDURE — 77063 BREAST TOMOSYNTHESIS BI: CPT

## 2020-05-20 PROCEDURE — 77067 SCR MAMMO BI INCL CAD: CPT

## 2020-07-02 ENCOUNTER — TELEPHONE (OUTPATIENT)
Dept: UROLOGY | Facility: CLINIC | Age: 50
End: 2020-07-02

## 2020-07-24 DIAGNOSIS — F41.1 GENERALIZED ANXIETY DISORDER: ICD-10-CM

## 2020-07-28 RX ORDER — FLUOXETINE HYDROCHLORIDE 40 MG/1
CAPSULE ORAL
Qty: 90 CAPSULE | Refills: 1 | OUTPATIENT
Start: 2020-07-28

## 2020-07-29 ENCOUNTER — TELEPHONE (OUTPATIENT)
Dept: UROLOGY | Facility: MEDICAL CENTER | Age: 50
End: 2020-07-29

## 2020-07-29 DIAGNOSIS — N39.0 RECURRENT UTI: Primary | ICD-10-CM

## 2020-07-29 NOTE — TELEPHONE ENCOUNTER
Patient at the [facility] office, patient known for kidney stones  Patient last office visit 1/15/2019  Called and spoke to patient at this time  Patient states she woke up this morning and believes she has an infection     Patient states has dysuria, pressure, urgency  Patient denies fever or chills, denies visible blood in the urine  Advised we would want to get urine sample orders placed at this time, can go to any [facility] lab  Office will monitor for results      Advised on aggressive hydration and to call office if any change in symptoms such as fever over 101, severe uncontrollable pain, unable to void    Patient verbalized understanding and is thankful for call     Will route to provider for any other recommendations

## 2020-07-29 NOTE — TELEPHONE ENCOUNTER
Patient managed by Dr Darlene Ugalde  Patient called in stating she is experiencing a bladder infection  Patient stated she is feeling a lot of pressure and urgency  Patient stated she has gone to the bathroom several times last night  Patient stated she is not having any burning as yet    Patient can be reached at [phone]

## 2020-07-30 ENCOUNTER — APPOINTMENT (OUTPATIENT)
Dept: LAB | Facility: CLINIC | Age: 50
End: 2020-07-30
Payer: COMMERCIAL

## 2020-07-30 DIAGNOSIS — N39.0 RECURRENT UTI: ICD-10-CM

## 2020-07-30 LAB
BACTERIA UR QL AUTO: ABNORMAL /HPF
BILIRUB UR QL STRIP: NEGATIVE
CLARITY UR: ABNORMAL
COLOR UR: YELLOW
GLUCOSE UR STRIP-MCNC: NEGATIVE MG/DL
HGB UR QL STRIP.AUTO: ABNORMAL
KETONES UR STRIP-MCNC: NEGATIVE MG/DL
LEUKOCYTE ESTERASE UR QL STRIP: NEGATIVE
MUCOUS THREADS UR QL AUTO: ABNORMAL
NITRITE UR QL STRIP: NEGATIVE
NON-SQ EPI CELLS URNS QL MICRO: ABNORMAL /HPF
PH UR STRIP.AUTO: 7 [PH]
PROT UR STRIP-MCNC: NEGATIVE MG/DL
RBC #/AREA URNS AUTO: ABNORMAL /HPF
SP GR UR STRIP.AUTO: 1.01 (ref 1–1.03)
UROBILINOGEN UR QL STRIP.AUTO: 0.2 E.U./DL
WBC #/AREA URNS AUTO: ABNORMAL /HPF

## 2020-07-30 PROCEDURE — 81001 URINALYSIS AUTO W/SCOPE: CPT

## 2020-07-30 PROCEDURE — 87086 URINE CULTURE/COLONY COUNT: CPT

## 2020-07-30 NOTE — TELEPHONE ENCOUNTER
Notes recorded by Lelo Dhillon PA-C on 7/30/2020 at 2:58 PM EDT  Urine is only positive for blood   I will continue to monitor for culture results and prescribe an antibiotic if appropriate  Called and reviewed preliminary results with patient  Encouraged her to call back with fever, chills or worsening symptoms  Otherwise will await results of culture to advise further

## 2020-07-31 LAB — BACTERIA UR CULT: NORMAL

## 2020-07-31 NOTE — TELEPHONE ENCOUNTER
Jorgito Omalley, [address]  Urology [facility]             Urine testing does not indicate infection   Workup for hematuria can be discussed at her upcoming appointment  Called and spoke with patient at this time  Advised of results  She reports she is feeling slightly better, pushing fluids and cranberry juice  She states it also occurred to her she does tend to hold her urine for longer than she should and not drink enough water  Advised if symptoms worsen she should call back, otherwise follow up as scheduled  The blood in the urine will also be discussed and follow up with at that time  Patient had no further questions

## 2020-08-23 DIAGNOSIS — K21.9 GASTROESOPHAGEAL REFLUX DISEASE WITHOUT ESOPHAGITIS: ICD-10-CM

## 2020-08-24 RX ORDER — FAMOTIDINE 20 MG/1
TABLET, FILM COATED ORAL
Qty: 60 TABLET | Refills: 3 | Status: SHIPPED | OUTPATIENT
Start: 2020-08-24 | End: 2020-12-14

## 2020-08-26 ENCOUNTER — HOSPITAL ENCOUNTER (OUTPATIENT)
Dept: RADIOLOGY | Facility: HOSPITAL | Age: 50
Discharge: HOME | End: 2020-08-26
Payer: COMMERCIAL

## 2020-08-26 DIAGNOSIS — N20.0 KIDNEY STONE: ICD-10-CM

## 2020-08-26 PROCEDURE — 74018 RADEX ABDOMEN 1 VIEW: CPT

## 2020-08-30 ENCOUNTER — OFFICE VISIT (OUTPATIENT)
Dept: URGENT CARE | Age: 50
End: 2020-08-30
Payer: COMMERCIAL

## 2020-08-30 VITALS
HEART RATE: 105 BPM | RESPIRATION RATE: 18 BRPM | TEMPERATURE: 98.6 F | SYSTOLIC BLOOD PRESSURE: 150 MMHG | OXYGEN SATURATION: 97 % | BODY MASS INDEX: 21.47 KG/M2 | DIASTOLIC BLOOD PRESSURE: 70 MMHG | HEIGHT: 70 IN | WEIGHT: 150 LBS

## 2020-08-30 DIAGNOSIS — R09.81 SINUS CONGESTION: ICD-10-CM

## 2020-08-30 DIAGNOSIS — M54.2 NECK PAIN: Primary | ICD-10-CM

## 2020-08-30 PROCEDURE — G0382 LEV 3 HOSP TYPE B ED VISIT: HCPCS | Performed by: NURSE PRACTITIONER

## 2020-08-30 RX ORDER — BACLOFEN 10 MG/1
10 TABLET ORAL 3 TIMES DAILY PRN
Qty: 30 TABLET | Refills: 0 | Status: SHIPPED | OUTPATIENT
Start: 2020-08-30

## 2020-08-30 NOTE — PROGRESS NOTES
[address]        NAME: Robert Carrasco is a 48 y o  female  : 1970    MRN: [id number]  DATE: 2020  TIME: 11:10 AM    Assessment and Plan   Neck pain [M54 2]  1  Neck pain  baclofen 10 mg tablet   2  Sinus congestion           Patient Instructions     Cont aleve prn  Cont Flonase  Start mucinex OTC  Baclofen can cause drowsiness; be careful when operating machinery/driving  Follow up with PCP in 3-5 days  Proceed to  ER if symptoms worsen  Chief Complaint     Chief Complaint   Patient presents with    Cold Like Symptoms     sinus pressure and congestion that started yesterday , neck and head ache x 4 days          History of Present Illness       HPI   Reports sinus congestion that started yesterday  Also having neck pain and headache for about 4 days  Neck pain and headache started after she helped move some furniture at her job and followed that with helping her  with some lawn mowing equipment  Review of Systems   Review of Systems   Constitutional: Negative for chills and fever  Respiratory: Negative for shortness of breath  Cardiovascular: Negative for chest pain  Musculoskeletal: Positive for neck pain  Neurological: Positive for headaches (achy, mild, intermittent, generalized, better with aleve, no sensitivity to light or noise)           Current Medications       Current Outpatient Medications:     famotidine (PEPCID) 20 mg tablet, TAKE 1 TABLET BY MOUTH TWICE DAILY, Disp: 60 tablet, Rfl: 3    FLUoxetine (PROzac) 40 MG capsule, Take 1 capsule (40 mg total) by mouth daily, Disp: 90 capsule, Rfl: 1    fluticasone (FLONASE) 50 mcg/act nasal spray, 1 spray into each nostril daily, Disp: , Rfl:     ibuprofen (MOTRIN) 200 mg tablet, Take 3 tablets (600 mg total) by mouth every 6 (six) hours as needed for mild pain, Disp: , Rfl: 0    LORazepam (ATIVAN) 0 5 mg tablet, Take 1 tablet (0 5 mg total) by mouth daily as needed for anxiety, Disp: 15 tablet, Rfl: 0    LOTEMAX 0 5 % OINT, APPLY A SMALL AMOUNT INTO BOTH EYES TID PRN, Disp: , Rfl: 1    rizatriptan (MAXALT) 10 MG tablet, Take 1 tablet (10 mg total) by mouth once as needed for migraine for up to 1 dose May repeat in 2 hours if needed, Disp: 9 tablet, Rfl: 0    SYNTHROID 75 MCG tablet, TAKE 1 TABLET(75 MCG) BY MOUTH DAILY EARLY MORNING, Disp: 30 tablet, Rfl: 0    baclofen 10 mg tablet, Take 1 tablet (10 mg total) by mouth 3 (three) times a day as needed for muscle spasms, Disp: 30 tablet, Rfl: 0    Elastic Bandages & Supports (MEDICAL COMPRESSION PANTYHOSE) MISC, by Does not apply route daily 15-20mmHg (Patient not taking: Reported on 8/30/2020), Disp: 2 each, Rfl: 4    loratadine (CLARITIN) 10 mg tablet, Take 10 mg by mouth daily in the early morning  , Disp: , Rfl:     naproxen (NAPROSYN) 500 mg tablet, Take 1 tablet (500 mg total) by mouth 2 (two) times a day with meals for 14 days Take with food, Disp: 28 tablet, Rfl: 0    potassium citrate (UROCIT-K) 10 mEq, potassium citrate ER 10 mEq (1,080 mg) tablet,extended release  TK 1 T PO BID, Disp: , Rfl:     Current Allergies     Allergies as of 08/30/2020 - Reviewed 08/30/2020   Allergen Reaction Noted    Latex Rash 12/22/2017    Biaxin [clarithromycin] Diarrhea 12/22/2017    Cefzil [cefprozil] Dizziness 12/22/2017    Flagyl [metronidazole] Nausea Only 12/22/2017    Percocet [oxycodone-acetaminophen] Nausea Only 12/22/2017            The following portions of the patient's history were reviewed and updated as appropriate: allergies, current medications, past family history, past medical history, past social history, past surgical history and problem list      Past Medical History:   Diagnosis Date    Anxiety     Chronic otitis externa     Last assessed 12/18/2017    Disease of thyroid gland     GERD (gastroesophageal reflux disease)     Hyperlipidemia     Kidney stone     Migraines     Miscarriage        Past Surgical History:   Procedure Laterality Date    DILATION AND CURETTAGE OF UTERUS      for SAB     EAR SURGERY      age 6    [facility] RETROGRADE PYELOGRAM  1/11/2019    [place] CYSTO/URETERO W/LITHOTRIPSY &INDWELL STENT INSRT Left 1/11/2019    Procedure: CYSTOSCOPY URETEROSCOPY WITH LITHOTRIPSY HOLMIUM LASER, RETROGRADE PYELOGRAM AND INSERTION STENT URETERAL;  Surgeon: Justine Berry MD;  [facility];  Service: Urology    [place] ESOPHAGOGASTRODUODENOSCOPY TRANSORAL DIAGNOSTIC N/A 12/27/2017    Procedure: ESOPHAGOGASTRODUODENOSCOPY (EGD); Surgeon: Pedro Oswald MD;  Location: [facility]; Service: Gastroenterology    TUBAL LIGATION      VEIN LIGATION AND STRIPPING Bilateral     WISDOM TOOTH EXTRACTION         Family History   Problem Relation Age of Onset    Hypertension Mother     Hypertension Father     Hyperlipidemia Father     No Known Problems Sister     Asthma Daughter     Autism Daughter     Anxiety disorder Daughter     Asthma Maternal Grandmother     No Known Problems Maternal Grandfather     No Known Problems Paternal Grandmother     No Known Problems Paternal Grandfather     Asthma Daughter     Asthma Daughter          Medications have been verified  Objective   /70   Pulse 105   Temp 98 6 °F (37 °C)   Resp 18   Ht 5' 10" (1 778 m)   Wt 68 kg (150 lb)   SpO2 97%   BMI 21 52 kg/m²        Physical Exam     Physical Exam  HENT:      Head:      Comments: Pa,lpation of the sinuses are normal  Turbinates 1+     Right Ear: Tympanic membrane and ear canal normal       Left Ear: Tympanic membrane and ear canal normal       Nose: Rhinorrhea present  Mouth/Throat:      Mouth: Mucous membranes are moist       Pharynx: No oropharyngeal exudate or posterior oropharyngeal erythema  Neck:      Musculoskeletal: No neck rigidity  Cardiovascular:      Rate and Rhythm: Regular rhythm  Pulmonary:      Effort: Pulmonary effort is normal       Breath sounds: Normal breath sounds     Musculoskeletal: Normal range of motion  General: Tenderness (with palpation of the trapezius muscles of the neck/shoulder, B/L ) present  Neurological:      Mental Status: She is alert

## 2020-08-30 NOTE — PATIENT INSTRUCTIONS
Muscle Spasm   WHAT YOU NEED TO KNOW:   A muscle spasm is a sudden contraction of any muscle or group of muscles  A muscle cramp is a painful muscle spasm  Muscle cramps commonly occur after intense exercise or during pregnancy  They may also be caused by certain medications, dehydration, low calcium or magnesium levels, or another medical condition  DISCHARGE INSTRUCTIONS:   Medicines: You may need the following:  · NSAIDs  help decrease swelling and pain or fever  This medicine is available with or without a doctor's order  NSAIDs can cause stomach bleeding or kidney problems in certain people  If you take blood thinner medicine, always ask your healthcare provider if NSAIDs are safe for you  Always read the medicine label and follow directions  · Take your medicine as directed  Contact your healthcare provider if you think your medicine is not helping or if you have side effects  Tell him of her if you are allergic to any medicine  Keep a list of the medicines, vitamins, and herbs you take  Include the amounts, and when and why you take them  Bring the list or the pill bottles to follow-up visits  Carry your medicine list with you in case of an emergency  Follow up with your healthcare provider as directed: You may need other tests or treatment  You may also be referred to a physical therapist or other specialist  Write down your questions so you remember to ask them during your visits  Self-care:   · Stretch  your muscle to help relieve the cramp  It may be helpful to keep your muscle in the stretched position until the cramp is gone  · Apply heat  to help decrease pain and muscle spasms  Apply heat on the area for 20 to 30 minutes every 2 hours for as many days as directed  · Apply ice  to help decrease swelling and pain  Ice may also help prevent tissue damage  Use an ice pack, or put crushed ice in a plastic bag   Cover it with a towel and place it on your muscle for 15 to 20 minutes every hour or as directed  · Drink more liquids  to help prevent muscle cramps caused by dehydration  Sports drinks may help replace electrolytes you lose through sweat during exercise  Ask your healthcare provider how much liquid to drink each day and which liquids are best for you  · Eat healthy foods , such as fruits, vegetables, whole grains, low-fat dairy products, and lean proteins (meat, beans, and fish)  If you are pregnant, ask your healthcare provider about foods that are high in magnesium and sodium  They may help to relieve cramps during pregnancy  · Massage your muscle  to help relieve the cramp  · Take frequent deep breaths  until the cramp feels better  Lie down while you take the deep breaths so you do not get dizzy or lightheaded  Contact your healthcare provider if:   · You have signs of dehydration, such as a headache, dark yellow urine, dry eyes or mouth, or a fast heartbeat  · You have questions or concerns about your condition or care  Return to the emergency department if:   · You have warmth, swelling, or redness in the cramping muscle  · You have frequent or unrelieved muscle cramps in several different muscles  · You have muscle cramps with numbness, tingling, and burning in your hands and feet  © 2017 [address] Information is for End User's use only and may not be sold, redistributed or otherwise used for commercial purposes  All illustrations and images included in CareNotes® are the copyrighted property of [employer]  or Milton Sandoval  The above information is an  only  It is not intended as medical advice for individual conditions or treatments  Talk to your doctor, nurse or pharmacist before following any medical regimen to see if it is safe and effective for you

## 2020-08-31 ENCOUNTER — OFFICE VISIT (OUTPATIENT)
Dept: UROLOGY | Facility: CLINIC | Age: 50
End: 2020-08-31
Payer: COMMERCIAL

## 2020-08-31 VITALS
HEART RATE: 87 BPM | TEMPERATURE: 97.3 F | HEIGHT: 70 IN | DIASTOLIC BLOOD PRESSURE: 86 MMHG | SYSTOLIC BLOOD PRESSURE: 124 MMHG | BODY MASS INDEX: 21.76 KG/M2 | WEIGHT: 152 LBS

## 2020-08-31 DIAGNOSIS — N20.0 KIDNEY STONE: Primary | ICD-10-CM

## 2020-08-31 LAB
BACTERIA UR QL AUTO: ABNORMAL /HPF
BILIRUB UR QL STRIP: NEGATIVE
CLARITY UR: CLEAR
COLOR UR: ABNORMAL
GLUCOSE UR STRIP-MCNC: NEGATIVE MG/DL
HGB UR QL STRIP.AUTO: NEGATIVE
HYALINE CASTS #/AREA URNS LPF: ABNORMAL /LPF
KETONES UR STRIP-MCNC: NEGATIVE MG/DL
LEUKOCYTE ESTERASE UR QL STRIP: ABNORMAL
NITRITE UR QL STRIP: NEGATIVE
NON-SQ EPI CELLS URNS QL MICRO: ABNORMAL /HPF
PH UR STRIP.AUTO: 6.5 [PH]
PROT UR STRIP-MCNC: ABNORMAL MG/DL
RBC #/AREA URNS AUTO: ABNORMAL /HPF
SP GR UR STRIP.AUTO: 1.03 (ref 1–1.03)
UROBILINOGEN UR QL STRIP.AUTO: 1 E.U./DL
WBC #/AREA URNS AUTO: ABNORMAL /HPF

## 2020-08-31 PROCEDURE — 1036F TOBACCO NON-USER: CPT | Performed by: PHYSICIAN ASSISTANT

## 2020-08-31 PROCEDURE — 3008F BODY MASS INDEX DOCD: CPT | Performed by: PHYSICIAN ASSISTANT

## 2020-08-31 PROCEDURE — 99213 OFFICE O/P EST LOW 20 MIN: CPT | Performed by: PHYSICIAN ASSISTANT

## 2020-08-31 PROCEDURE — 81001 URINALYSIS AUTO W/SCOPE: CPT | Performed by: PHYSICIAN ASSISTANT

## 2020-08-31 RX ORDER — POTASSIUM CITRATE 10 MEQ/1
10 TABLET, EXTENDED RELEASE ORAL 2 TIMES DAILY
Qty: 60 TABLET | Refills: 11 | Status: SHIPPED | OUTPATIENT
Start: 2020-08-31

## 2020-08-31 NOTE — PROGRESS NOTES
1  Kidney stone  potassium citrate (UROCIT-K) 10 mEq    Urinalysis with microscopic         Assessment and plan:       1  Nephrolithiasis - managed by Dr Jordyn Warren   -we reviewed that her most recent imaging reveals possible left intrarenal stones however also potential fecal content as she had overlying bowel  - patient was some I symptomatic of a passing stone given during her episode of microscopic hematuria  Her symptoms since subsided  Will plan for repeat urinalysis with microscopy  Should there be residual microscopic hematuria, she will need CT urogram and cystoscopy to complete workup  If this shows resolution of her microscopic hematuria, then patient likely recently passed stone and we will plan for KUB ultrasound in 1 year  Julius Asencio PA-C      Chief Complaint     Chief Complaint   Patient presents with    Nephrolithiasis         History of Present Illness     Mary Dan is a 48 y o  female patient of Dr Jordyn Warren with a history of nephrolithiasis presenting for follow-up  Patient was having difficulties passing a 3 mm left distal ureteral calculus  She underwent cystoscopy, left ureteroscopy, laser lithotripsy, retrograde pyelography, left ureteral stent placement (01/11/2019)  There is a left lower pole intrarenal calculus that was completely fragmented  Patient's stone analysis was 90% calcium oxalate in origin  Patient follows with Nephrology and is undergoing metabolic evaluation for recurrent nephrolithiasis  She has been initiated on potassium citrate b i d  Patient states that she has not seen nephrology in over a year and has run out of her medications  This was refilled for her  KUB 8/26/2020 revealing several punctate densities overlying the left renal silhouette, 4 mm and smaller may represent renal calculi or bowel content  Patient contacted our office approximately 1 month ago with increasing urinary frequency, urgency, suprapubic pressure  Urine specimen obtain revealed microscopic hematuria, however negative for infection  Patient does not recall any flank pain at that time  Patient had hydrated excessively in her symptoms subsided approximately 3 days thereafter  She did not visualized passage of stone in that event however  Laboratory     Lab Results   Component Value Date    CREATININE 0 87 12/15/2018     Review of Systems     Review of Systems   Constitutional: Negative for activity change, appetite change, chills, diaphoresis, fatigue, fever and unexpected weight change  Respiratory: Negative for chest tightness and shortness of breath  Cardiovascular: Negative for chest pain, palpitations and leg swelling  Gastrointestinal: Negative for abdominal distention, abdominal pain, constipation, diarrhea, nausea and vomiting  Genitourinary: Negative for decreased urine volume, difficulty urinating, dysuria, enuresis, flank pain, frequency, genital sores, hematuria and urgency  Musculoskeletal: Negative for back pain, gait problem and myalgias  Skin: Negative for color change, pallor, rash and wound  Psychiatric/Behavioral: Negative for behavioral problems  The patient is not nervous/anxious  Allergies     Allergies   Allergen Reactions    Latex Rash     Where touch with Latex  Allergic to Kiwi and bananas    Biaxin [Clarithromycin] Diarrhea    Cefzil [Cefprozil] Dizziness    Flagyl [Metronidazole] Nausea Only    Percocet [Oxycodone-Acetaminophen] Nausea Only       Physical Exam     Physical Exam  Constitutional:       General: She is not in acute distress  Appearance: She is well-developed  She is not diaphoretic  HENT:      Head: Normocephalic and atraumatic  Eyes:      Conjunctiva/sclera: Conjunctivae normal    Neck:      Musculoskeletal: Normal range of motion  Trachea: No tracheal deviation  Pulmonary:      Effort: Pulmonary effort is normal    Musculoskeletal: Normal range of motion  General: No deformity  Skin:     General: Skin is warm and dry  Coloration: Skin is not pale  Findings: No erythema  Neurological:      Mental Status: She is alert and oriented to person, place, and time     Psychiatric:         Behavior: Behavior normal            Vital Signs     Vitals:    08/31/20 1017   BP: 124/86   BP Location: Left arm   Patient Position: Sitting   Cuff Size: Standard   Pulse: 87   Temp: (!) 97 3 °F (36 3 °C)   TempSrc: Temporal   Weight: 68 9 kg (152 lb)   Height: 5' 10" (1 778 m)         Current Medications       Current Outpatient Medications:     baclofen 10 mg tablet, Take 1 tablet (10 mg total) by mouth 3 (three) times a day as needed for muscle spasms, Disp: 30 tablet, Rfl: 0    Elastic Bandages & Supports (MEDICAL COMPRESSION PANTYHOSE) MISC, by Does not apply route daily 15-20mmHg, Disp: 2 each, Rfl: 4    famotidine (PEPCID) 20 mg tablet, TAKE 1 TABLET BY MOUTH TWICE DAILY, Disp: 60 tablet, Rfl: 3    FLUoxetine (PROzac) 40 MG capsule, Take 1 capsule (40 mg total) by mouth daily, Disp: 90 capsule, Rfl: 1    fluticasone (FLONASE) 50 mcg/act nasal spray, 1 spray into each nostril daily, Disp: , Rfl:     ibuprofen (MOTRIN) 200 mg tablet, Take 3 tablets (600 mg total) by mouth every 6 (six) hours as needed for mild pain, Disp: , Rfl: 0    loratadine (CLARITIN) 10 mg tablet, Take 10 mg by mouth daily in the early morning  , Disp: , Rfl:     LORazepam (ATIVAN) 0 5 mg tablet, Take 1 tablet (0 5 mg total) by mouth daily as needed for anxiety, Disp: 15 tablet, Rfl: 0    LOTEMAX 0 5 % OINT, APPLY A SMALL AMOUNT INTO BOTH EYES TID PRN, Disp: , Rfl: 1    naproxen (NAPROSYN) 500 mg tablet, Take 1 tablet (500 mg total) by mouth 2 (two) times a day with meals for 14 days Take with food, Disp: 28 tablet, Rfl: 0    potassium citrate (UROCIT-K) 10 mEq, Take 1 tablet (10 mEq total) by mouth 2 (two) times a day, Disp: 60 tablet, Rfl: 11    rizatriptan (MAXALT) 10 MG tablet, Take 1 tablet (10 mg total) by mouth once as needed for migraine for up to 1 dose May repeat in 2 hours if needed, Disp: 9 tablet, Rfl: 0    SYNTHROID 75 MCG tablet, TAKE 1 TABLET(75 MCG) BY MOUTH DAILY EARLY MORNING, Disp: 30 tablet, Rfl: 0      Active Problems     Patient Active Problem List   Diagnosis    Influenza    Allergic rhinitis    Anxiety disorder    Early menopause    Gastroesophageal reflux disease without esophagitis    Hyperlipidemia    Rhus dermatitis    Migraines    Lower back pain    Lactose intolerance    Hypothyroidism due to acquired atrophy of thyroid    Sprain of ulnar collateral ligament of left elbow    Nephrolithiasis    Abnormal CT scan    Chronic idiopathic constipation    Varicose veins of leg with pain, left    Asymptomatic varicose veins    Pain in soft tissues of limb    Pruritic disorder    Rash and other nonspecific skin eruption         Past Medical History     Past Medical History:   Diagnosis Date    Anxiety     Chronic otitis externa     Last assessed 12/18/2017    Disease of thyroid gland     GERD (gastroesophageal reflux disease)     Hyperlipidemia     Kidney stone     Migraines     Miscarriage          Surgical History     Past Surgical History:   Procedure Laterality Date    DILATION AND CURETTAGE OF UTERUS      for SAB     EAR SURGERY      age 6    [place] RETROGRADE PYELOGRAM  1/11/2019    [place] CYSTO/URETERO W/LITHOTRIPSY &INDWELL STENT INSRT Left 1/11/2019    Procedure: CYSTOSCOPY URETEROSCOPY WITH LITHOTRIPSY HOLMIUM LASER, RETROGRADE PYELOGRAM AND INSERTION STENT URETERAL;  Surgeon: Justine Berry MD;  [facility];  Service: Urology    HI ESOPHAGOGASTRODUODENOSCOPY TRANSORAL DIAGNOSTIC N/A 12/27/2017    Procedure: ESOPHAGOGASTRODUODENOSCOPY (EGD); Surgeon: Pedro Oswald MD;  [facility];   Service: Gastroenterology    TUBAL LIGATION      VEIN LIGATION AND STRIPPING Bilateral     WISDOM TOOTH EXTRACTION         Family History     Family History   Problem Relation Age of Onset    Hypertension Mother     Hypertension Father     Hyperlipidemia Father     No Known Problems Sister     Asthma Daughter     Autism Daughter     Anxiety disorder Daughter     Asthma Maternal Grandmother     No Known Problems Maternal Grandfather     No Known Problems Paternal Grandmother     No Known Problems Paternal Grandfather     Asthma Daughter     Asthma Daughter        Social History     Social History       Radiology     RENAL ULTRASOUND     INDICATION:   N20 0: Calculus of kidney  Follow-up renal calculi      COMPARISON: Renal ultrasound December 26, 2018, CT abdomen pelvis December 15, 2018 and retrograde pyelogram January 11, 2019      TECHNIQUE:   Ultrasound of the retroperitoneum was performed with a curvilinear transducer utilizing volumetric sweeps and still imaging techniques       FINDINGS:     KIDNEYS:  Symmetric and normal size  Right kidney:  11 0 x 4 6 cm  Left kidney:  10 7 x 5 5 cm      Right kidney  Normal echogenicity and contour  No suspicious masses detected  Stable minimally complex 11 mm cyst midpole  No hydronephrosis  No shadowing calculi  No perinephric fluid collections      Left kidney  Normal echogenicity and contour  No suspicious masses detected  No hydronephrosis  No shadowing calculi  No perinephric fluid collections      URETERS:  Nonvisualized      BLADDER:   Incompletely distended  Circumferential wall thickening, likely due to under distention  No focal thickening or mass lesions  Bilateral ureteral jets detected         IMPRESSION:  No evidence of renal calculi or hydronephrosis

## 2020-09-14 ENCOUNTER — OFFICE VISIT (OUTPATIENT)
Dept: FAMILY MEDICINE CLINIC | Facility: OTHER | Age: 50
End: 2020-09-14
Payer: COMMERCIAL

## 2020-09-14 VITALS
TEMPERATURE: 97.8 F | BODY MASS INDEX: 21.47 KG/M2 | DIASTOLIC BLOOD PRESSURE: 86 MMHG | OXYGEN SATURATION: 98 % | HEART RATE: 92 BPM | SYSTOLIC BLOOD PRESSURE: 128 MMHG | HEIGHT: 70 IN | WEIGHT: 150 LBS

## 2020-09-14 DIAGNOSIS — Z12.11 SCREENING FOR COLORECTAL CANCER: ICD-10-CM

## 2020-09-14 DIAGNOSIS — F41.1 GENERALIZED ANXIETY DISORDER: ICD-10-CM

## 2020-09-14 DIAGNOSIS — Z11.4 SCREENING FOR HIV (HUMAN IMMUNODEFICIENCY VIRUS): ICD-10-CM

## 2020-09-14 DIAGNOSIS — E78.2 MIXED HYPERLIPIDEMIA: ICD-10-CM

## 2020-09-14 DIAGNOSIS — Z12.12 SCREENING FOR COLORECTAL CANCER: ICD-10-CM

## 2020-09-14 DIAGNOSIS — Z00.00 ANNUAL PHYSICAL EXAM: Primary | ICD-10-CM

## 2020-09-14 PROCEDURE — 99396 PREV VISIT EST AGE 40-64: CPT | Performed by: FAMILY MEDICINE

## 2020-09-14 RX ORDER — LORAZEPAM 0.5 MG/1
0.5 TABLET ORAL DAILY PRN
Qty: 15 TABLET | Refills: 0 | Status: SHIPPED | OUTPATIENT
Start: 2020-09-14

## 2020-09-14 NOTE — LETTER
September 14, 2020     Patient: Genesis Dyer   YOB: 1970   Date of Visit: 9/14/2020       To Whom it May Concern:    Nikolas Naveens is under my professional care  She was seen in my office on 9/14/2020  She may return to work on 9/14/2020  If you have any questions or concerns, please don't hesitate to call           Sincerely,          Wesley Loredo DO        CC: No Recipients

## 2020-09-14 NOTE — PROGRESS NOTES
[address] [place]    NAME: Karon Michelle  AGE: 48 y o  SEX: female  : 1970     DATE: 2020     Assessment and Plan:     Problem List Items Addressed This Visit        Other    Anxiety disorder    Relevant Medications    LORazepam (ATIVAN) 0 5 mg tablet    Hyperlipidemia    Relevant Orders    Comprehensive metabolic panel    Lipid panel      Other Visit Diagnoses     Annual physical exam    -  Primary    Screening for HIV (human immunodeficiency virus)        Relevant Orders    HIV 1/2 Antigen/Antibody (4th Generation) w Reflex [facility]    Screening for colorectal cancer        Relevant Orders    Ambulatory referral for Colonoscopy          Immunizations and preventive care screenings were discussed with patient today  Appropriate education was printed on patient's after visit summary  Counseling:  Alcohol/drug use: discussed moderation in alcohol intake, the recommendations for healthy alcohol use, and avoidance of illicit drug use  Dental Health: discussed importance of regular tooth brushing, flossing, and dental visits  Injury prevention: discussed safety/seat belts, safety helmets, smoke detectors, carbon dioxide detectors, and smoking near bedding or upholstery  Sexual health: discussed sexually transmitted diseases, partner selection, use of condoms, avoidance of unintended pregnancy, and contraceptive alternatives  · Exercise: the importance of regular exercise/physical activity was discussed  Recommend exercise 3-5 times per week for at least 30 minutes  Return in about 1 year (around 2021) for Annual physical      Chief Complaint:     Chief Complaint   Patient presents with    Annual Exam      History of Present Illness:     Adult Annual Physical   Patient here for a comprehensive physical exam  The patient reports no problems      Diet and Physical Activity  · Diet/Nutrition: well balanced diet, limited junk food, consuming 3-5 servings of fruits/vegetables daily, adequate fiber intake and adequate whole grain intake  · Exercise: moderate cardiovascular exercise, 5-7 times a week on average and 30-60 minutes on average  Depression Screening  PHQ-9 Depression Screening    PHQ-9:    Frequency of the following problems over the past two weeks:            General Health  · Sleep: sleeps well and gets 7-8 hours of sleep on average  · Hearing: normal - bilateral   · Vision: goes for regular eye exams  · Dental: regular dental visits  /GYN Health  · Patient is: postmenopausal  · Last menstrual period: n/a  · Contraceptive method: n/a  Review of Systems:     Review of Systems   Constitutional: Negative for appetite change, fatigue, fever and unexpected weight change  HENT: Negative for congestion, dental problem, ear pain, postnasal drip, sore throat and tinnitus  Eyes: Negative for pain, discharge and visual disturbance  Respiratory: Negative for cough, shortness of breath and wheezing  Cardiovascular: Negative for chest pain, palpitations and leg swelling  Gastrointestinal: Negative for abdominal pain, constipation, diarrhea, nausea and vomiting  Endocrine: Negative for cold intolerance and heat intolerance  Genitourinary: Negative for difficulty urinating, dysuria, flank pain and urgency  Musculoskeletal: Negative for arthralgias, back pain, joint swelling and myalgias  Skin: Negative for rash and wound  Allergic/Immunologic: Negative for immunocompromised state  Neurological: Negative for dizziness, syncope, speech difficulty, weakness and numbness  Hematological: Negative for adenopathy  Does not bruise/bleed easily  Psychiatric/Behavioral: Negative for confusion, dysphoric mood and sleep disturbance  The patient is not nervous/anxious         Past Medical History:     Past Medical History:   Diagnosis Date    Anxiety     Chronic otitis externa     Last assessed 12/18/2017    Disease of thyroid gland     GERD (gastroesophageal reflux disease)     Hyperlipidemia     Kidney stone     Migraines     Miscarriage       Past Surgical History:     Past Surgical History:   Procedure Laterality Date    DILATION AND CURETTAGE OF UTERUS      for SAB     EAR SURGERY      age 6    [place] RETROGRADE PYELOGRAM  1/11/2019    [facility] CYSTO/URETERO W/LITHOTRIPSY &INDWELL STENT INSRT Left 1/11/2019    Procedure: CYSTOSCOPY URETEROSCOPY WITH LITHOTRIPSY HOLMIUM LASER, RETROGRADE PYELOGRAM AND INSERTION STENT URETERAL;  Surgeon: Shruti Kingston MD;  [facility];  Service: Urology    VA ESOPHAGOGASTRODUODENOSCOPY TRANSORAL DIAGNOSTIC N/A 12/27/2017    Procedure: ESOPHAGOGASTRODUODENOSCOPY (EGD); Surgeon: Chelsi Bautista MD;  [facility];   Service: Gastroenterology    TUBAL LIGATION      VEIN LIGATION AND STRIPPING Bilateral     WISDOM TOOTH EXTRACTION        Social History:        Social History     Socioeconomic History    Marital status: /Civil Union     Spouse name: None    Number of children: None    Years of education: 12    Highest education level: None   Occupational History     Employer: [employer]   Social Needs    Financial resource strain: None    Food insecurity     Worry: None     Inability: None    Transportation needs     Medical: None     Non-medical: None   Tobacco Use    Smoking status: Never Smoker    Smokeless tobacco: Never Used   Substance and Sexual Activity    Alcohol use: No    Drug use: Never    Sexual activity: Yes     Partners: Male     Birth control/protection: Post-menopausal, None   Lifestyle    Physical activity     Days per week: None     Minutes per session: None    Stress: None   Relationships    Social connections     Talks on phone: None     Gets together: None     Attends Jainism service: None     Active member of club or organization: None     Attends meetings of clubs or organizations: None     Relationship status: None    Intimate partner violence     Fear of current or ex partner: None     Emotionally abused: None     Physically abused: None     Forced sexual activity: None   Other Topics Concern    None   Social History Narrative    Does not exercise      Family History:     Family History   Problem Relation Age of Onset    Hypertension Mother     Hypertension Father     Hyperlipidemia Father     No Known Problems Sister     Asthma Daughter     Autism Daughter     Anxiety disorder Daughter     Asthma Maternal Grandmother     No Known Problems Maternal Grandfather     No Known Problems Paternal Grandmother     No Known Problems Paternal Grandfather     Asthma Daughter     Asthma Daughter       Current Medications:     Current Outpatient Medications   Medication Sig Dispense Refill    baclofen 10 mg tablet Take 1 tablet (10 mg total) by mouth 3 (three) times a day as needed for muscle spasms 30 tablet 0    Elastic Bandages & Supports (MEDICAL COMPRESSION PANTYHOSE) MISC by Does not apply route daily 15-20mmHg 2 each 4    famotidine (PEPCID) 20 mg tablet TAKE 1 TABLET BY MOUTH TWICE DAILY 60 tablet 3    FLUoxetine (PROzac) 40 MG capsule Take 1 capsule (40 mg total) by mouth daily 90 capsule 1    fluticasone (FLONASE) 50 mcg/act nasal spray 1 spray into each nostril daily      ibuprofen (MOTRIN) 200 mg tablet Take 3 tablets (600 mg total) by mouth every 6 (six) hours as needed for mild pain  0    LORazepam (ATIVAN) 0 5 mg tablet Take 1 tablet (0 5 mg total) by mouth daily as needed for anxiety 15 tablet 0    LOTEMAX 0 5 % OINT APPLY A SMALL AMOUNT INTO BOTH EYES TID PRN  1    potassium citrate (UROCIT-K) 10 mEq Take 1 tablet (10 mEq total) by mouth 2 (two) times a day 60 tablet 11    rizatriptan (MAXALT) 10 MG tablet Take 1 tablet (10 mg total) by mouth once as needed for migraine for up to 1 dose May repeat in 2 hours if needed 9 tablet 0    SYNTHROID 75 MCG tablet TAKE 1 TABLET(75 MCG) BY MOUTH DAILY EARLY MORNING 30 tablet 0    naproxen (NAPROSYN) 500 mg tablet Take 1 tablet (500 mg total) by mouth 2 (two) times a day with meals for 14 days Take with food 28 tablet 0     No current facility-administered medications for this visit  Allergies: Allergies   Allergen Reactions    Latex Rash     Where touch with Latex  Allergic to Kiwi and bananas    Biaxin [Clarithromycin] Diarrhea    Cefzil [Cefprozil] Dizziness    Flagyl [Metronidazole] Nausea Only    Percocet [Oxycodone-Acetaminophen] Nausea Only      Physical Exam:     /86 (BP Location: Left arm, Patient Position: Sitting, Cuff Size: Adult)   Pulse 92   Temp 97 8 °F (36 6 °C) (Temporal)   Ht 5' 10" (1 778 m)   Wt 68 kg (150 lb)   SpO2 98%   BMI 21 52 kg/m²     Physical Exam  Vitals signs and nursing note reviewed  Constitutional:       General: She is not in acute distress  Appearance: Normal appearance  She is well-developed  She is not ill-appearing  HENT:      Head: Normocephalic and atraumatic  Right Ear: Hearing, tympanic membrane, ear canal and external ear normal       Left Ear: Hearing, tympanic membrane, ear canal and external ear normal       Nose: Nose normal       Mouth/Throat:      Pharynx: Uvula midline  Eyes:      General: No scleral icterus  Conjunctiva/sclera: Conjunctivae normal       Pupils: Pupils are equal, round, and reactive to light  Neck:      Musculoskeletal: Normal range of motion and neck supple  Thyroid: No thyromegaly  Cardiovascular:      Rate and Rhythm: Normal rate and regular rhythm  Heart sounds: Normal heart sounds  No murmur  Pulmonary:      Effort: Pulmonary effort is normal  No respiratory distress  Breath sounds: Normal breath sounds  No wheezing  Abdominal:      General: Bowel sounds are normal  There is no distension  Palpations: Abdomen is soft  Tenderness: There is no abdominal tenderness  Musculoskeletal: Normal range of motion  General: No tenderness  Right lower leg: No edema  Left lower leg: No edema  Lymphadenopathy:      Cervical: No cervical adenopathy  Skin:     General: Skin is warm and dry  Findings: No erythema or rash  Neurological:      General: No focal deficit present  Mental Status: She is alert and oriented to person, place, and time  Cranial Nerves: No cranial nerve deficit     Psychiatric:         Mood and Affect: Mood normal          Behavior: Behavior normal           Yael Self, DO [address]

## 2020-09-15 ENCOUNTER — ANNUAL EXAM (OUTPATIENT)
Dept: OBGYN CLINIC | Facility: CLINIC | Age: 50
End: 2020-09-15
Payer: COMMERCIAL

## 2020-09-15 VITALS
HEIGHT: 69 IN | TEMPERATURE: 97.1 F | BODY MASS INDEX: 22.22 KG/M2 | WEIGHT: 150 LBS | DIASTOLIC BLOOD PRESSURE: 68 MMHG | SYSTOLIC BLOOD PRESSURE: 104 MMHG

## 2020-09-15 DIAGNOSIS — Z01.419 WELL FEMALE EXAM WITH ROUTINE GYNECOLOGICAL EXAM: Primary | ICD-10-CM

## 2020-09-15 DIAGNOSIS — Z13.820 SCREENING FOR OSTEOPOROSIS: ICD-10-CM

## 2020-09-15 DIAGNOSIS — Z12.4 SCREENING FOR MALIGNANT NEOPLASM OF CERVIX: ICD-10-CM

## 2020-09-15 DIAGNOSIS — Z12.31 ENCOUNTER FOR SCREENING MAMMOGRAM FOR MALIGNANT NEOPLASM OF BREAST: ICD-10-CM

## 2020-09-15 DIAGNOSIS — Z11.51 SCREENING FOR HPV (HUMAN PAPILLOMAVIRUS): ICD-10-CM

## 2020-09-15 PROBLEM — I83.812 VARICOSE VEINS OF LEG WITH PAIN, LEFT: Status: RESOLVED | Noted: 2019-10-31 | Resolved: 2020-09-15

## 2020-09-15 PROCEDURE — 99396 PREV VISIT EST AGE 40-64: CPT | Performed by: OBSTETRICS & GYNECOLOGY

## 2020-09-15 PROCEDURE — 87624 HPV HI-RISK TYP POOLED RSLT: CPT | Performed by: OBSTETRICS & GYNECOLOGY

## 2020-09-15 PROCEDURE — G0145 SCR C/V CYTO,THINLAYER,RESCR: HCPCS | Performed by: OBSTETRICS & GYNECOLOGY

## 2020-09-15 RX ORDER — FEXOFENADINE HCL 180 MG/1
180 TABLET ORAL DAILY
COMMUNITY

## 2020-09-16 PROBLEM — E28.319 PREMATURE MENOPAUSE: Status: ACTIVE | Noted: 2020-09-16

## 2020-09-16 NOTE — PROGRESS NOTES
ASSESSMENT & PLAN: Daisy Barrientos is a 48 y o  [id number] with normal gynecologic exam     1   Routine well woman exam done today  2    Pap and HPV:Pap with HPV was done today  Current ASCCP Guidelines reviewed  Last Pap  2017 :  no abnormalities  3  Mammogram ordered  Recommend yearly mammography  DEXA ordered  4  Colonoscopy recommended  5  The patient is sexually active  6  The following were reviewed in today's visit: breast self exam, STD testing, use and side effects of HRT, adequate intake of calcium and vitamin D, exercise and healthy diet  7  Patient to return to office in 12 months for annual      All questions have been answered to her satisfaction  CC:  Annual Gynecologic Examination    HPI: Daisy Barrientos is a 48 y o  [id number] who presents for annual gynecologic examination  She has the following concerns:  none    Health Maintenance:    She exercises 2 days per week  She wears her seatbelt routinely  She does perform irregular monthly self breast exams  She feels safe at home  Patients does follow a reg diet  Last mammogram:2019       Past Medical History:   Diagnosis Date    Anxiety     Chronic otitis externa     Last assessed 12/18/2017    Disease of thyroid gland     GERD (gastroesophageal reflux disease)     Hyperlipidemia     Kidney stone     Migraines     Miscarriage        Past Surgical History:   Procedure Laterality Date    DILATION AND CURETTAGE OF UTERUS      for SAB     EAR SURGERY      age 6    [facility] RETROGRADE PYELOGRAM  1/11/2019    [place] CYSTO/URETERO W/LITHOTRIPSY &INDWELL STENT INSRT Left 1/11/2019    Procedure: CYSTOSCOPY URETEROSCOPY WITH LITHOTRIPSY HOLMIUM LASER, RETROGRADE PYELOGRAM AND INSERTION STENT URETERAL;  Surgeon: Yuan Quintanilla MD;  [facility];  Service: Urology    [place] ESOPHAGOGASTRODUODENOSCOPY TRANSORAL DIAGNOSTIC N/A 12/27/2017    Procedure: ESOPHAGOGASTRODUODENOSCOPY (EGD);   Surgeon: Cherylene Needy, MD;  [facility];  Service: Gastroenterology    TUBAL LIGATION      VEIN LIGATION AND STRIPPING Bilateral     WISDOM TOOTH EXTRACTION         Past OB/Gyn History:  Period Cycle (Days): (n/a post menopausal)No LMP recorded  Patient is postmenopausal   Menstrual History:  OB History        4    Para   3    Term   3            AB   1    Living   3       SAB   1    TAB        Ectopic        Multiple        Live Births   3           Obstetric Comments   Menarche 15  Post menopausal 45            Menstrual history: Patient is post menopausal    History of sexually transmitted infection No  Patient is currently sexually active  heterosexual Birth control: postmenopausal  Last Pap  2017 :  no abnormalities      Family History   Problem Relation Age of Onset    Hypertension Mother     Hypertension Father     Hyperlipidemia Father     No Known Problems Sister     Asthma Daughter     Autism Daughter     Anxiety disorder Daughter     Asthma Maternal Grandmother     No Known Problems Maternal Grandfather     No Known Problems Paternal Grandmother     No Known Problems Paternal Grandfather     Asthma Daughter     Asthma Daughter        Social History:  Social History     Socioeconomic History    Marital status: /Civil Union     Spouse name: Not on file    Number of children: Not on file    Years of education: 15    Highest education level: Not on file   Occupational History     Employer: [employer]    Financial resource strain: Not on file    Food insecurity     Worry: Not on file     Inability: Not on file    Transportation needs     Medical: Not on file     Non-medical: Not on file   Tobacco Use    Smoking status: Never Smoker    Smokeless tobacco: Never Used   Substance and Sexual Activity    Alcohol use: No    Drug use: Never    Sexual activity: Yes     Partners: Male     Birth control/protection: Post-menopausal   Lifestyle    Physical activity     Days per week: Not on file     Minutes per session: Not on file    Stress: Not on file   Relationships    Social connections     Talks on phone: Not on file     Gets together: Not on file     Attends Restorationist service: Not on file     Active member of club or organization: Not on file     Attends meetings of clubs or organizations: Not on file     Relationship status: Not on file    Intimate partner violence     Fear of current or ex partner: Not on file     Emotionally abused: Not on file     Physically abused: Not on file     Forced sexual activity: Not on file   Other Topics Concern    Not on file   Social History Narrative    Does not exercise     Presently lives with her family  Patient is   Patient is currently employed   Allergies   Allergen Reactions    Latex Rash     Where touch with Latex   Allergic to Kiwi and bananas    Biaxin [Clarithromycin] Diarrhea    Cefzil [Cefprozil] Dizziness    Flagyl [Metronidazole] Nausea Only    Percocet [Oxycodone-Acetaminophen] Nausea Only       Current Outpatient Medications:     baclofen 10 mg tablet, Take 1 tablet (10 mg total) by mouth 3 (three) times a day as needed for muscle spasms, Disp: 30 tablet, Rfl: 0    Elastic Bandages & Supports (MEDICAL COMPRESSION PANTYHOSE) MISC, by Does not apply route daily 15-20mmHg, Disp: 2 each, Rfl: 4    famotidine (PEPCID) 20 mg tablet, TAKE 1 TABLET BY MOUTH TWICE DAILY, Disp: 60 tablet, Rfl: 3    fexofenadine (ALLEGRA) 180 MG tablet, Take 180 mg by mouth daily, Disp: , Rfl:     FLUoxetine (PROzac) 40 MG capsule, Take 1 capsule (40 mg total) by mouth daily, Disp: 90 capsule, Rfl: 1    fluticasone (FLONASE) 50 mcg/act nasal spray, 1 spray into each nostril daily, Disp: , Rfl:     ibuprofen (MOTRIN) 200 mg tablet, Take 3 tablets (600 mg total) by mouth every 6 (six) hours as needed for mild pain, Disp: , Rfl: 0    LORazepam (ATIVAN) 0 5 mg tablet, Take 1 tablet (0 5 mg total) by mouth daily as needed for anxiety, Disp: 15 tablet, Rfl: 0    LOTEMAX 0 5 % OINT, APPLY A SMALL AMOUNT INTO BOTH EYES TID PRN, Disp: , Rfl: 1    potassium citrate (UROCIT-K) 10 mEq, Take 1 tablet (10 mEq total) by mouth 2 (two) times a day, Disp: 60 tablet, Rfl: 11    rizatriptan (MAXALT) 10 MG tablet, Take 1 tablet (10 mg total) by mouth once as needed for migraine for up to 1 dose May repeat in 2 hours if needed, Disp: 9 tablet, Rfl: 0    SYNTHROID 75 MCG tablet, TAKE 1 TABLET(75 MCG) BY MOUTH DAILY EARLY MORNING, Disp: 30 tablet, Rfl: 0    naproxen (NAPROSYN) 500 mg tablet, Take 1 tablet (500 mg total) by mouth 2 (two) times a day with meals for 14 days Take with food, Disp: 28 tablet, Rfl: 0    Review of Systems:  Review of Systems   Constitutional: Negative  HENT: Negative  Respiratory: Negative  Cardiovascular: Negative  Gastrointestinal: Negative  Genitourinary: Negative  Neurological: Negative  Physical Exam:  /68   Temp (!) 97 1 °F (36 2 °C)   Ht 5' 9" (1 753 m)   Wt 68 kg (150 lb)   Breastfeeding No   BMI 22 15 kg/m²    Physical Exam  Constitutional:       Appearance: Normal appearance  She is normal weight  Genitourinary:      Vulva, inguinal canal, urethra, vagina, cervix, uterus, right adnexa and left adnexa normal       No vulval lesion, tenderness or Bartholin's cyst noted  No signs of labial injury  No signs of injury in the vagina  Vaginal atrophy present  No vaginal discharge or tenderness  Cervix is parous  Cervical pinkness present  No cervical polyp  Uterus is mobile  Uterus is not enlarged or tender  HENT:      Head: Normocephalic and atraumatic  Eyes:      Extraocular Movements: Extraocular movements intact  Conjunctiva/sclera: Conjunctivae normal       Pupils: Pupils are equal, round, and reactive to light  Cardiovascular:      Rate and Rhythm: Normal rate and regular rhythm  Pulses: Normal pulses        Heart sounds: Normal heart sounds  No murmur  Pulmonary:      Effort: Pulmonary effort is normal  No respiratory distress  Breath sounds: Normal breath sounds  No wheezing or rales  Chest:      Breasts:         Right: Normal  No swelling, bleeding, inverted nipple, mass, nipple discharge, skin change or tenderness  Left: Normal  No swelling, bleeding, inverted nipple, mass, nipple discharge, skin change or tenderness  Abdominal:      General: Abdomen is flat  There is no distension  Palpations: Abdomen is soft  Tenderness: There is no abdominal tenderness  There is no guarding  Neurological:      General: No focal deficit present  Mental Status: She is alert and oriented to person, place, and time  Skin:     General: Skin is warm and dry  Coloration: Skin is not jaundiced or pale  Vitals signs and nursing note reviewed

## 2020-09-19 LAB
HPV HR 12 DNA CVX QL NAA+PROBE: NEGATIVE
HPV16 DNA CVX QL NAA+PROBE: NEGATIVE
HPV18 DNA CVX QL NAA+PROBE: NEGATIVE

## 2020-09-22 ENCOUNTER — TELEMEDICINE (OUTPATIENT)
Dept: FAMILY MEDICINE CLINIC | Facility: OTHER | Age: 50
End: 2020-09-22
Payer: COMMERCIAL

## 2020-09-22 DIAGNOSIS — J01.40 ACUTE NON-RECURRENT PANSINUSITIS: Primary | ICD-10-CM

## 2020-09-22 LAB
LAB AP GYN PRIMARY INTERPRETATION: NORMAL
Lab: NORMAL

## 2020-09-22 PROCEDURE — 1036F TOBACCO NON-USER: CPT | Performed by: FAMILY MEDICINE

## 2020-09-22 PROCEDURE — 99213 OFFICE O/P EST LOW 20 MIN: CPT | Performed by: FAMILY MEDICINE

## 2020-09-22 RX ORDER — AMOXICILLIN AND CLAVULANATE POTASSIUM 875; 125 MG/1; MG/1
1 TABLET, FILM COATED ORAL EVERY 12 HOURS SCHEDULED
Qty: 20 TABLET | Refills: 0 | Status: SHIPPED | OUTPATIENT
Start: 2020-09-22 | End: 2020-10-02

## 2020-09-22 NOTE — PROGRESS NOTES
Virtual Regular Visit      Assessment/Plan:    Problem List Items Addressed This Visit     None      Visit Diagnoses     Acute non-recurrent pansinusitis    -  Primary    Acute, bacterial  Empirically tx with augmentin x 10 days  Supportive care with OTC cough suppressants, mucinex    Relevant Medications    amoxicillin-clavulanate (Augmentin) 875-125 mg per tablet              Return if symptoms worsen or fail to improve  The patient indicates understanding of these issues and agrees with the plan  Reason for visit is   Chief Complaint   Patient presents with    Virtual Regular Visit        Encounter provider Vlad Cortes, DO    Provider located at [address] [id number]      Recent Visits  No visits were found meeting these conditions  Showing recent visits within past 7 days and meeting all other requirements     Today's Visits  Date Type Provider Dept   09/22/20 Telemedicine DO Paco Chan   Showing today's visits and meeting all other requirements     Future Appointments  No visits were found meeting these conditions  Showing future appointments within next 150 days and meeting all other requirements        The patient was identified by name and date of birth  Mindy Munson was informed that this is a telemedicine visit and that the visit is being conducted through [facility] and patient was informed that this is a secure, HIPAA-compliant platform  She agrees to proceed     My office door was closed  No one else was in the room  She acknowledged consent and understanding of privacy and security of the video platform  The patient has agreed to participate and understands they can discontinue the visit at any time  Patient is aware this is a billable service  Subjective  Mindy Munson is a 48 y o  female  Sinusitis   This is a new problem  The current episode started 1 to 4 weeks ago   The problem has been gradually worsening since onset  There has been no fever  The pain is moderate  Associated symptoms include congestion, coughing, headaches, a hoarse voice and sinus pressure  Pertinent negatives include no chills, diaphoresis, ear pain, neck pain, shortness of breath, sneezing, sore throat or swollen glands  Past treatments include nothing  The treatment provided no relief  Past Medical History:   Diagnosis Date    Anxiety     Chronic otitis externa     Last assessed 12/18/2017    Disease of thyroid gland     GERD (gastroesophageal reflux disease)     Hyperlipidemia     Kidney stone     Migraines     Miscarriage        Past Surgical History:   Procedure Laterality Date    DILATION AND CURETTAGE OF UTERUS      for SAB     EAR SURGERY      age 6    [facility] RETROGRADE PYELOGRAM  1/11/2019    CO CYSTO/URETERO W/LITHOTRIPSY &INDWELL STENT INSRT Left 1/11/2019    Procedure: CYSTOSCOPY URETEROSCOPY WITH LITHOTRIPSY HOLMIUM LASER, RETROGRADE PYELOGRAM AND INSERTION STENT URETERAL;  Surgeon: Nancy Mishra MD;  Location: [facility];  Service: Urology    CO ESOPHAGOGASTRODUODENOSCOPY TRANSORAL DIAGNOSTIC N/A 12/27/2017    Procedure: ESOPHAGOGASTRODUODENOSCOPY (EGD); Surgeon: Kiesha Perez MD;  Location: [facility]  GI [facility];   Service: Gastroenterology    TUBAL LIGATION      VEIN LIGATION AND STRIPPING Bilateral     WISDOM TOOTH EXTRACTION         Current Outpatient Medications   Medication Sig Dispense Refill    amoxicillin-clavulanate (Augmentin) 875-125 mg per tablet Take 1 tablet by mouth every 12 (twelve) hours for 10 days 20 tablet 0    baclofen 10 mg tablet Take 1 tablet (10 mg total) by mouth 3 (three) times a day as needed for muscle spasms 30 tablet 0    Elastic Bandages & Supports (MEDICAL COMPRESSION PANTYHOSE) MISC by Does not apply route daily 15-20mmHg 2 each 4    famotidine (PEPCID) 20 mg tablet TAKE 1 TABLET BY MOUTH TWICE DAILY 60 tablet 3    fexofenadine (ALLEGRA) 180 MG tablet Take 180 mg by mouth daily      FLUoxetine (PROzac) 40 MG capsule Take 1 capsule (40 mg total) by mouth daily 90 capsule 1    fluticasone (FLONASE) 50 mcg/act nasal spray 1 spray into each nostril daily      ibuprofen (MOTRIN) 200 mg tablet Take 3 tablets (600 mg total) by mouth every 6 (six) hours as needed for mild pain  0    LORazepam (ATIVAN) 0 5 mg tablet Take 1 tablet (0 5 mg total) by mouth daily as needed for anxiety 15 tablet 0    LOTEMAX 0 5 % OINT APPLY A SMALL AMOUNT INTO BOTH EYES TID PRN  1    naproxen (NAPROSYN) 500 mg tablet Take 1 tablet (500 mg total) by mouth 2 (two) times a day with meals for 14 days Take with food 28 tablet 0    potassium citrate (UROCIT-K) 10 mEq Take 1 tablet (10 mEq total) by mouth 2 (two) times a day 60 tablet 11    rizatriptan (MAXALT) 10 MG tablet Take 1 tablet (10 mg total) by mouth once as needed for migraine for up to 1 dose May repeat in 2 hours if needed 9 tablet 0    SYNTHROID 75 MCG tablet TAKE 1 TABLET(75 MCG) BY MOUTH DAILY EARLY MORNING 30 tablet 0     No current facility-administered medications for this visit  Allergies   Allergen Reactions    Latex Rash     Where touch with Latex  Allergic to Kiwi and bananas    Biaxin [Clarithromycin] Diarrhea    Cefzil [Cefprozil] Dizziness    Flagyl [Metronidazole] Nausea Only    Percocet [Oxycodone-Acetaminophen] Nausea Only       Review of Systems   Constitutional: Negative for activity change, chills, diaphoresis, fatigue and fever  HENT: Positive for congestion, hoarse voice and sinus pressure  Negative for ear pain, sinus pain, sneezing and sore throat  Eyes: Negative for pain and itching  Respiratory: Positive for cough  Negative for chest tightness and shortness of breath  Cardiovascular: Negative for chest pain and palpitations  Gastrointestinal: Negative for abdominal pain, constipation, diarrhea, nausea and vomiting  Endocrine: Negative for cold intolerance and heat intolerance  Genitourinary: Negative for dysuria  Musculoskeletal: Negative for myalgias and neck pain  Skin: Negative for color change and rash  Neurological: Positive for headaches  Negative for dizziness and syncope  Hematological: Negative for adenopathy  Psychiatric/Behavioral: Negative for behavioral problems, dysphoric mood and sleep disturbance  The patient is not nervous/anxious  Video Exam    There were no vitals filed for this visit  Physical Exam  Constitutional:       General: She is not in acute distress  Appearance: Normal appearance  She is well-developed  She is not ill-appearing  HENT:      Head: Normocephalic and atraumatic  Eyes:      General: No scleral icterus  Right eye: No discharge  Left eye: No discharge  Conjunctiva/sclera: Conjunctivae normal       Pupils: Pupils are equal, round, and reactive to light  Neck:      Musculoskeletal: Normal range of motion  Pulmonary:      Effort: Pulmonary effort is normal  No respiratory distress  Skin:     Coloration: Skin is not jaundiced or pale  Findings: No erythema  Neurological:      Mental Status: She is alert and oriented to person, place, and time  Cranial Nerves: No cranial nerve deficit  Coordination: Coordination normal    Psychiatric:         Mood and Affect: Mood normal          Behavior: Behavior normal           I spent 10 minutes directly with the patient during this visit      Neftaly Briones acknowledges that she has consented to an online visit or consultation  She understands that the online visit is based solely on information provided by her, and that, in the absence of a face-to-face physical evaluation by the physician, the diagnosis she receives is both limited and provisional in terms of accuracy and completeness  This is not intended to replace a full medical face-to-face evaluation by the physician   Betito Ramirez understands and accepts these terms

## 2020-09-28 ENCOUNTER — TELEPHONE (OUTPATIENT)
Dept: FAMILY MEDICINE CLINIC | Facility: OTHER | Age: 50
End: 2020-09-28

## 2020-09-28 DIAGNOSIS — J01.40 ACUTE NON-RECURRENT PANSINUSITIS: Primary | ICD-10-CM

## 2020-09-28 RX ORDER — SULFAMETHOXAZOLE AND TRIMETHOPRIM 800; 160 MG/1; MG/1
1 TABLET ORAL EVERY 12 HOURS SCHEDULED
Qty: 20 TABLET | Refills: 0 | Status: SHIPPED | OUTPATIENT
Start: 2020-09-28 | End: 2020-10-08

## 2020-09-28 NOTE — TELEPHONE ENCOUNTER
Patient called and stated she had a virtual visit last Monday and was put on Antibiotic  She says she is still not feeling well, has cough sinus congestion and post nasal drip   Please advise

## 2020-10-21 ENCOUNTER — TELEPHONE (OUTPATIENT)
Dept: OBGYN CLINIC | Facility: CLINIC | Age: 50
End: 2020-10-21

## 2020-10-22 DIAGNOSIS — B37.3 VAGINAL CANDIDIASIS: Primary | ICD-10-CM

## 2020-10-22 RX ORDER — FLUCONAZOLE 150 MG/1
150 TABLET ORAL ONCE
Qty: 1 TABLET | Refills: 0 | Status: SHIPPED | OUTPATIENT
Start: 2020-10-22 | End: 2020-10-22

## 2020-10-23 ENCOUNTER — OFFICE VISIT (OUTPATIENT)
Dept: URGENT CARE | Age: 50
End: 2020-10-23
Payer: COMMERCIAL

## 2020-10-23 VITALS — RESPIRATION RATE: 16 BRPM | HEART RATE: 105 BPM | TEMPERATURE: 97.2 F | OXYGEN SATURATION: 96 %

## 2020-10-23 DIAGNOSIS — Z20.822 EXPOSURE TO COVID-19 VIRUS: ICD-10-CM

## 2020-10-23 DIAGNOSIS — Z11.59 SCREENING FOR VIRAL DISEASE: Primary | ICD-10-CM

## 2020-10-23 PROCEDURE — U0003 INFECTIOUS AGENT DETECTION BY NUCLEIC ACID (DNA OR RNA); SEVERE ACUTE RESPIRATORY SYNDROME CORONAVIRUS 2 (SARS-COV-2) (CORONAVIRUS DISEASE [COVID-19]), AMPLIFIED PROBE TECHNIQUE, MAKING USE OF HIGH THROUGHPUT TECHNOLOGIES AS DESCRIBED BY CMS-2020-01-R: HCPCS | Performed by: PHYSICIAN ASSISTANT

## 2020-10-23 PROCEDURE — G0381 LEV 2 HOSP TYPE B ED VISIT: HCPCS | Performed by: PHYSICIAN ASSISTANT

## 2020-10-25 LAB — SARS-COV-2 RNA SPEC QL NAA+PROBE: NOT DETECTED

## 2020-10-26 ENCOUNTER — TELEPHONE (OUTPATIENT)
Dept: FAMILY MEDICINE CLINIC | Facility: OTHER | Age: 50
End: 2020-10-26

## 2020-10-26 ENCOUNTER — TELEPHONE (OUTPATIENT)
Dept: URGENT CARE | Age: 50
End: 2020-10-26

## 2020-11-12 ENCOUNTER — IMMUNIZATIONS (OUTPATIENT)
Dept: FAMILY MEDICINE CLINIC | Facility: OTHER | Age: 50
End: 2020-11-12
Payer: COMMERCIAL

## 2020-11-12 DIAGNOSIS — Z23 ENCOUNTER FOR IMMUNIZATION: ICD-10-CM

## 2020-11-12 PROCEDURE — 90471 IMMUNIZATION ADMIN: CPT

## 2020-11-12 PROCEDURE — 90682 RIV4 VACC RECOMBINANT DNA IM: CPT

## 2020-12-13 DIAGNOSIS — K21.9 GASTROESOPHAGEAL REFLUX DISEASE WITHOUT ESOPHAGITIS: ICD-10-CM

## 2020-12-13 DIAGNOSIS — F41.1 GENERALIZED ANXIETY DISORDER: ICD-10-CM

## 2020-12-14 RX ORDER — FAMOTIDINE 20 MG/1
TABLET, FILM COATED ORAL
Qty: 60 TABLET | Refills: 3 | Status: SHIPPED | OUTPATIENT
Start: 2020-12-14 | End: 2021-03-16

## 2020-12-14 RX ORDER — FLUOXETINE HYDROCHLORIDE 40 MG/1
CAPSULE ORAL
Qty: 90 CAPSULE | Refills: 1 | OUTPATIENT
Start: 2020-12-14

## 2021-03-01 ENCOUNTER — TELEPHONE (OUTPATIENT)
Dept: GASTROENTEROLOGY | Facility: CLINIC | Age: 51
End: 2021-03-01

## 2021-03-13 DIAGNOSIS — K21.9 GASTROESOPHAGEAL REFLUX DISEASE WITHOUT ESOPHAGITIS: ICD-10-CM

## 2021-03-16 RX ORDER — FAMOTIDINE 20 MG/1
TABLET, FILM COATED ORAL
Qty: 60 TABLET | Refills: 3 | Status: SHIPPED | OUTPATIENT
Start: 2021-03-16

## 2021-03-25 DIAGNOSIS — Z12.11 COLON CANCER SCREENING: Primary | ICD-10-CM

## 2021-03-25 NOTE — PROGRESS NOTES
Left message on machine reminding patient of active lab orders, due colon cancer screen and mammogram that is due in 5/2021

## 2022-02-10 NOTE — LETTER
January 15, 2019     Patient: Karon Michelle   YOB: 1970   Date of Visit: 1/15/2019       To Whom it May Concern:    Millicent Zuñiga is under the care of DR Quay Brunner  She was seen in our office on 1/15/2019  She is cleared to return to work without restrictions on 1/22/19  If you have any questions or concerns, please don't hesitate to call           Sincerely,        [facility]      CC: No Recipients
No assistance needed

## 2025-01-14 NOTE — TELEPHONE ENCOUNTER
Patient called back and she will get kub before her visit  She goes to [facility] and I told her she does not need the script in her hand and it is on the computer system  Teaching Attestation:  I have personally reviewed this patient's chart along with the visit note. I confirmed the findings listed below:    Anxiety  (primary encounter diagnosis)  Insomnia due to other mental disorder     This was discussed with the resident and I agree with the assessment and plan as documented.  The plan of care was discussed with the patient.

## (undated) DEVICE — PACK TUR

## (undated) DEVICE — PREMIUM DRY TRAY LF: Brand: MEDLINE INDUSTRIES, INC.

## (undated) DEVICE — CHLORHEXIDINE 4PCT 4 OZ

## (undated) DEVICE — INVIEW CLEAR LEGGINGS: Brand: CONVERTORS

## (undated) DEVICE — CATH URETERAL 5FR X 70 CM FLEX TIP POLYUR BARD

## (undated) DEVICE — GUIDEWIRE STRGHT TIP 0.035 IN  SOLO PLUS

## (undated) DEVICE — 3M™ TEGADERM™ TRANSPARENT FILM DRESSING FRAME STYLE, 1624W, 2-3/8 IN X 2-3/4 IN (6 CM X 7 CM), 100/CT 4CT/CASE: Brand: 3M™ TEGADERM™

## (undated) DEVICE — 200 MICRON SINGLE-USE HOLMIUM FIBER ASSEMBLY WITH FLAT TIP: Brand: OPTILITE

## (undated) DEVICE — UROCATCH BAG

## (undated) DEVICE — STERILE SURGICAL LUBRICANT,  TUBE: Brand: SURGILUBE